# Patient Record
Sex: MALE | Race: BLACK OR AFRICAN AMERICAN | Employment: FULL TIME | URBAN - METROPOLITAN AREA
[De-identification: names, ages, dates, MRNs, and addresses within clinical notes are randomized per-mention and may not be internally consistent; named-entity substitution may affect disease eponyms.]

---

## 2017-01-04 RX ORDER — OLMESARTAN MEDOXOMIL AND HYDROCHLOROTHIAZIDE 20/12.5 20; 12.5 MG/1; MG/1
TABLET ORAL
Qty: 30 TAB | Refills: 12 | Status: SHIPPED | OUTPATIENT
Start: 2017-01-04 | End: 2017-10-24 | Stop reason: SDUPTHER

## 2017-01-18 ENCOUNTER — OFFICE VISIT (OUTPATIENT)
Dept: SLEEP MEDICINE | Age: 52
End: 2017-01-18

## 2017-01-18 VITALS
SYSTOLIC BLOOD PRESSURE: 123 MMHG | HEIGHT: 70 IN | WEIGHT: 263 LBS | BODY MASS INDEX: 37.65 KG/M2 | DIASTOLIC BLOOD PRESSURE: 87 MMHG | HEART RATE: 91 BPM | OXYGEN SATURATION: 96 %

## 2017-01-18 DIAGNOSIS — G47.33 OSA (OBSTRUCTIVE SLEEP APNEA): Primary | ICD-10-CM

## 2017-01-18 DIAGNOSIS — E66.9 OBESITY (BMI 30-39.9): ICD-10-CM

## 2017-01-18 NOTE — PROGRESS NOTES
217 [facility] [place]. [place], [address]  Tel.  [phone]  Fax. 100 [facility] 60  [place], [address] [facility]  Tel.  [phone]  Fax. [phone] [address]  Tel.  [phone]  Fax. [phone]       Subjective:      Leslie Meng is a 49 y.o. male who I am asked to see in consultation for evaluation and management of sleep apnea. He was diagnosed with sleep apnea 10 years ago. He is using his device regularly. He complains of awakening in the middle of the night because of SOB associated with CPAP device malfunctioning. Symptoms began a few weeks ago, unchanged since that time. He usually can fall asleep in 5 minutes. Family or house members note snoring. He denies completely or partially paralyzed while falling asleep or waking up. There are no  mask comfort problems. The following problems are noted with PAP:     Drowsiness no Problems exhaling no   Snoring no Forget to put on no   Mask Comfortable yes Can't fall asleep no   Dry Mouth no Mask falls off no   Air Leaking no Frequent awakenings no     Marleny Puckette Sr. does wake up frequently at night. He is not bothered by waking up too early and left unable to get back to sleep. He actually sleeps about 5 hours at night and wakes up about 3 times during the night. He does work shifts:  First Shift. Analisa Caal indicates he does get too little sleep at night. His bedtime is 2300. He awakens at 0500. He does not take naps. He takes   naps a week lasting  . He has the following observed behaviors: Loud snoring, Pauses in breathing;  . Other remarks: waking with gasp    [place] Sleepiness Score: 17   which reflect moderate daytime drowsiness.     No Known Allergies      Current Outpatient Prescriptions:     olmesartan-hydroCHLOROthiazide (BENICAR HCT) 20-12.5 mg per tablet, TAKE 1 TABLET BY MOUTH DAILY FOR BLOOD PRESSURE, Disp: 30 Tab, Rfl: 12    atomoxetine (STRATTERA) 40 mg capsule, Take 1 Cap by mouth daily. , Disp: 30 Cap, Rfl: 12    amphetamine-dextroamphetamine XR (ADDERALL XR) 20 mg XR capsule, Take 2 Caps by mouth every morning. Max Daily Amount: 40 mg., Disp: 60 Cap, Rfl: 0    testosterone cypionate (DEPOTESTOTERONE CYPIONATE) 200 mg/mL injection, , Disp: , Rfl:     tadalafil (CIALIS) 20 mg tablet, Take 1 Tab by mouth as needed. 1/2 - 1 tab one hour before sex on an empty stomach, Disp: 5 Tab, Rfl: 12    levothyroxine (SYNTHROID) 300 mcg tablet, Take  by mouth daily (before breakfast). , Disp: , Rfl:     promethazine-dextromethorphan (PROMETHAZINE-DM) 6.25-15 mg/5 mL syrup, Take 5 mL by mouth four (4) times daily as needed for Cough. , Disp: 240 mL, Rfl: 2    azithromycin (ZITHROMAX) 250 mg tablet, 2 tabs day 1 , then 1 tab daily next 4 days, Disp: 6 Tab, Rfl: 0    promethazine-codeine (PHENERGAN WITH CODEINE) 6.25-10 mg/5 mL syrup, Take 5 mL by mouth every six (6) hours as needed for Cough. Max Daily Amount: 20 mL., Disp: 240 mL, Rfl: o    naproxen (NAPROSYN) 500 mg tablet, Take 1 Tab by mouth two (2) times daily (with meals). For back pain, Disp: 60 Tab, Rfl: 5     He  has a past medical history of Hypertension; Sleep apnea; and Thyroid disease. He  has a past surgical history that includes orthopaedic; mohs procedure; other surgical (age 7); and cyst removal (2012). He family history includes Diabetes in his brother and father; Heart Disease in his mother. He  reports that he has never smoked. He has never used smokeless tobacco. He reports that he does not drink alcohol or use illicit drugs. Review of Systems:  Constitutional:  No significant weight loss or weight gain. Eyes:  No blurred vision. CVS:  No significant chest pain  Pulm:  No significant shortness of breath  GI:  No significant nausea or vomiting  :  No significant nocturia  Musculoskeletal:  No significant joint pain at night  Skin:  No significant rashes  Neuro:   No significant dizziness   Psych:  No active mood issues    Sleep Review of Systems: notable for no difficulty falling asleep; infrequent awakenings at night;  regular dreaming noted; no nightmares ; no early morning headaches ; no memory problems; no concentration issues; no history of any automobile or occupational accidents due to daytime drowsiness. Objective:     Visit Vitals    /87    Pulse 91    Ht 5' 10\" (1.778 m)    Wt 263 lb (119.3 kg)    SpO2 96%    BMI 37.74 kg/m2         General:   Not in acute distress   Eyes:  Anicteric sclerae, no obvious strabismus   Nose:  No obvious nasal septum deviation    Oropharynx:   Class 3 oropharyngeal outlet, thick tongue base,low-lying soft palate, narrow tonsilo-pharyngeal pilars   Tonsils:   tonsils are absent   Neck:   Neck circ. in \"inches\": 17.5; midline trachea   Chest/Lungs:  Equal lung expansion, clear on auscultation    CVS:  Normal rate, regular rhythm; no JVD   Skin:  Warm to touch; no obvious rashes   Neuro:  No focal deficits ; no obvious tremor    Psych:  Normal affect,  normal countenance;          Assessment:       ICD-10-CM ICD-9-CM    1. ASHLY (obstructive sleep apnea) G47.33 327.23 AMB SUPPLY ORDER   2. Obesity (BMI 30-39. 9) E66.9 278.00      AHI = ?. On CPAP :  13 cmH2O. Compliant:      yes    Therapeutic Response:  Positive  Plan:     * He was provided information on sleep apnea including coresponding risk factors and the importance of proper treatment. * He was likewise counseled on weight management and lateral sleeping posture (with the use of bed pillows or wedge) which may reduce sleep apnea events. Caution with hypnotics, alcohol, and sedating pain medications as these can worsen sleep apnea. * We've requested previous sleep records and studies. Orders Placed This Encounter    AMB SUPPLY ORDER     * Loaner repair/replace Positive Airway Pressure device.   Current device dysfunctional.    Positive Airway Pressure CPAP 5-15 cmH2O  Airsense 10 Autoset with climate line as needed  PAP Mask  patient preference,heated humidifer, tubing, filters (all sleep supplies) as needed    Wireless modem enrollment with privileges to change therapy remotely - indefinite. Length of Need = 99 months    Rigo Francisco M.D.  (electronically signed)  Diplomate in Sleep Medicine, [facility]   * PAP card download in 4 weeks. PAP clinic if adherence remains poor  Follow-up Disposition:  Return if symptoms worsen or fail to improve. Counseling was provided regarding the importance of regular PAP use and on proper sleep hygiene and safe driving. I have reviewed/discussed the above normal BMI with the patient. I have recommended the following interventions: dietary management education, guidance, and counseling . The plan is as follows: I have counseled this patient on diet and exercise regimens. .    Thank you for allowing us to participate in your patient's medical care.     Rigo Francisco M.D.  (electronically signed)  Diplomate in Sleep Medicine, [facility]

## 2017-01-18 NOTE — PATIENT INSTRUCTIONS
217 [facility] [place]. [place], [address]  Tel.  [phone]  Fax. 100 [facility] 60  [place], [address] [facility]  Tel.  [phone]  Fax. [phone] [facility] [place] [address] Marija Damico  Tel.  [phone]  Fax. [phone]     Learning About CPAP for Sleep Apnea  What is CPAP? CPAP is a small machine that you use at home every night while you sleep. It increases air pressure in your throat to keep your airway open. When you have sleep apnea, this can help you sleep better so you feel much better. CPAP stands for \"continuous positive airway pressure. \"  The CPAP machine will have one of the following:  · A mask that covers your nose and mouth  · Prongs that fit into your nose  · A mask that covers your nose only, the most common type. This type is called NCPAP. The N stands for \"nasal.\"  Why is it done? CPAP is usually the best treatment for obstructive sleep apnea. It is the first treatment choice and the most widely used. Your doctor may suggest CPAP if you have:  · Moderate to severe sleep apnea. · Sleep apnea and coronary artery disease (CAD) or heart failure. How does it help? · CPAP can help you have more normal sleep, so you feel less sleepy and more alert during the daytime. · CPAP may help keep heart failure or other heart problems from getting worse. · NCPAP may help lower your blood pressure. · If you use CPAP, your bed partner may also sleep better because you are not snoring or restless. What are the side effects? Some people who use CPAP have:  · A dry or stuffy nose and a sore throat. · Irritated skin on the face. · Sore eyes. · Bloating. If you have any of these problems, work with your doctor to fix them. Here are some things you can try:  · Be sure the mask or nasal prongs fit well. · See if your doctor can adjust the pressure of your CPAP. · If your nose is dry, try a humidifier.   · If your nose is runny or stuffy, try decongestant medicine or a steroid nasal spray. If these things do not help, you might try a different type of machine. Some machines have air pressure that adjusts on its own. Others have air pressures that are different when you breathe in than when you breathe out. This may reduce discomfort caused by too much pressure in your nose. Where can you learn more? Go to GSOUND.be  Enter Latoya Toscano in the search box to learn more about \"Learning About CPAP for Sleep Apnea. \"   © 1055-3416 Healthwise, Incorporated. Care instructions adapted under license by [facility] [employer] (which disclaims liability or warranty for this information). This care instruction is for use with your licensed healthcare professional. If you have questions about a medical condition or this instruction, always ask your healthcare professional. [address] any warranty or liability for your use of this information. Content Version: 5.9.30732; Last Revised: January 11, 2010  PROPER SLEEP HYGIENE    What to avoid  · Do not have drinks with caffeine, such as coffee or black tea, for 8 hours before bed. · Do not smoke or use other types of tobacco near bedtime. Nicotine is a stimulant and can keep you awake. · Avoid drinking alcohol late in the evening, because it can cause you to wake in the middle of the night. · Do not eat a big meal close to bedtime. If you are hungry, eat a light snack. · Do not drink a lot of water close to bedtime, because the need to urinate may wake you up during the night. · Do not read or watch TV in bed. Use the bed only for sleeping and sexual activity. What to try  · Go to bed at the same time every night, and wake up at the same time every morning. Do not take naps during the day. · Keep your bedroom quiet, dark, and cool. · Get regular exercise, but not within 3 to 4 hours of your bedtime. .  · Sleep on a comfortable pillow and mattress.   · If watching the clock makes you anxious, turn it facing away from you so you cannot see the time. · If you worry when you lie down, start a worry book. Well before bedtime, write down your worries, and then set the book and your concerns aside. · Try meditation or other relaxation techniques before you go to bed. · If you cannot fall asleep, get up and go to another room until you feel sleepy. Do something relaxing. Repeat your bedtime routine before you go to bed again. · Make your house quiet and calm about an hour before bedtime. Turn down the lights, turn off the TV, log off the computer, and turn down the volume on music. This can help you relax after a busy day. Drowsy Driving: The [facility] 54 cites drowsiness as a causing factor in more than 638,006 police reported crashes annually, resulting in 76,000 injuries and 1,500 deaths. Other surveys suggest 55% of people polled have driven while drowsy in the past year, 23% had fallen asleep but not crashed, 3% crashed, and 2% had and accident due to drowsy driving. Who is at risk? Young Drivers: One study of drowsy driving accidents states that 55% of the drivers were under 25 years. Of those, 75% were male. Shift Workers and Travelers: People who work overnight or travel across time zones frequently are at higher risk of experiencing Circadian Rhythm Disorders. They are trying to work and function when their body is programed to sleep. Sleep Deprived: Lack of sleep has a serious impact on your ability to pay attention or focus on a task. Consistently getting less than the average of 8 hours your body needs creates partial or cumulative sleep deprivation. Untreated Sleep Disorders: Sleep Apnea, Narcolepsy, R.L.S., and other sleep disorders (untreated) prevent a person from getting enough restful sleep. This leads to excessive daytime sleepiness and increases the risk for drowsy driving accidents by up to 7 times.   Medications / Alcohol: Even over the counter medications can cause drowsiness. Medications that impair a drivers attention should have a warning label. Alcohol naturally makes you sleepy and on its own can cause accidents. Combined with excessive drowsiness its effects are amplified. Signs of Drowsy Driving:   * You don't remember driving the last few miles   * You may drift out of your nagi   * You are unable to focus and your thoughts wander   * You may yawn more often than normal   * You have difficulty keeping your eyes open / nodding off   * Missing traffic signs, speeding, or tailgating  Prevention-   Good sleep hygiene, lifestyle and behavioral choices have the most impact on drowsy driving. There is no substitute for sleep and the average person requires 8 hours nightly. If you find yourself driving drowsy, stop and sleep. Consider the sleep hygiene tips provided during your visit as well. Medication Refill Policy: Refills for all medications require 1 week advance notice. Please have your pharmacy fax a refill request. We are unable to fax, or call in \"controled substance\" medications and you will need to pick these prescriptions up from our office. FiscalNote Activation    Thank you for requesting access to [employer]. Please follow the instructions below to securely access and download your online medical record. [employer]Note allows you to send messages to your doctor, view your test results, renew your prescriptions, schedule appointments, and more. How Do I Sign Up? 1. In your internet browser, go to [email or web address]. 2. Click on the First Time User? Click Here link in the Sign In box. You will see the New Member Sign Up page. 3. Enter your FiscalNote Access Code exactly as it appears below. You will not need to use this code after youve completed the sign-up process. If you do not sign up before the expiration date, you must request a new code.     FiscalNote Access Code: [email or web address]  Expires: 2017  3:41 PM (This is the date your [employer] access code will )    4. Enter the last four digits of your Social Security Number (xxxx) and Date of Birth (mm/dd/yyyy) as indicated and click Submit. You will be taken to the next sign-up page. 5. Create a [employer] ID. This will be your [employer] login ID and cannot be changed, so think of one that is secure and easy to remember. 6. Create a [employer] password. You can change your password at any time. 7. Enter your Password Reset Question and Answer. This can be used at a later time if you forget your password. 8. Enter your e-mail address. You will receive e-mail notification when new information is available in [address] 9. Click Sign Up. You can now view and download portions of your medical record. 10. Click the Download Summary menu link to download a portable copy of your medical information. Additional Information    If you have questions, please call [phone]. Remember, [employer] is NOT to be used for urgent needs. For medical emergencies, dial 911. Starting a Weight Loss Plan: After Your Visit  Your Care Instructions  If you are thinking about losing weight, it can be hard to know where to start. Your doctor can help you set up a weight loss plan that best meets your needs. You may want to take a class on nutrition or exercise, or join a weight loss support group. If you have questions about how to make changes to your eating or exercise habits, ask your doctor about seeing a registered dietitian or an exercise specialist.  It can be a big challenge to lose weight. But you do not have to make huge changes at once. Make small changes, and stick with them. When those changes become habit, add a few more changes. If you do not think you are ready to make changes right now, try to pick a date in the future.  Make an appointment to see your doctor to discuss whether the time is right for you to start a plan.  Follow-up care is a key part of your treatment and safety. Be sure to make and go to all appointments, and call your doctor if you are having problems. It's also a good idea to know your test results and keep a list of the medicines you take. How can you care for yourself at home? · Set realistic goals. Many people expect to lose much more weight than is likely. A weight loss of 5% to 10% of your body weight may be enough to improve your health. · Get family and friends involved to provide support. Talk to them about why you are trying to lose weight, and ask them to help. They can help by participating in exercise and having meals with you, even if they may be eating something different. · Find what works best for you. If you do not have time or do not like to cook, a program that offers meal replacement bars or shakes may be better for you. Or if you like to prepare meals, finding a plan that includes daily menus and recipes may be best.  · Ask your doctor about other health professionals who can help you achieve your weight loss goals. ¨ A dietitian can help you make healthy changes in your diet. ¨ An exercise specialist or  can help you develop a safe and effective exercise program.  ¨ A counselor or psychiatrist can help you cope with issues such as depression, anxiety, or family problems that can make it hard to focus on weight loss. · Consider joining a support group for people who are trying to lose weight. Your doctor can suggest groups in your area. Where can you learn more? Go to inVentiv Health.be  Enter U357 in the search box to learn more about \"Starting a Weight Loss Plan: After Your Visit. \"   © 1403-7163 Healthwise, Incorporated. Care instructions adapted under license by Rosa Chou (which disclaims liability or warranty for this information).  This care instruction is for use with your licensed healthcare professional. If you have questions about a medical condition or this instruction, always ask your healthcare professional. [place] [address] any warranty or liability for your use of this information.   Content Version: 2.1.87526; Last Revised: September 1, 2009

## 2017-01-18 NOTE — MR AVS SNAPSHOT
Visit Information Date & Time Provider Department Dept. Phone Encounter #  
 1/18/2017  3:20 PM Shante Lyle[facility] [id number] Follow-up Instructions Return if symptoms worsen or fail to improve. Routing History Follow-up and Disposition History Your Appointments 1/31/2017  4:15 PM  
Any with Padma Clifford MD  
[facility] [address] [facility]) Appt Note: med refill for ADD  
 [address] [place] [id number]  
[phone] [address] [facility] [address] Upcoming Health Maintenance Date Due Pneumococcal 19-64 Highest Risk (1 of 3 - PCV13) 9/19/1985 FOBT Q 1 YEAR AGE 50-75 9/19/2016 DTaP/Tdap/Td series (2 - Td) 9/26/2022 Allergies as of 1/18/2017  Review Complete On: 1/18/2017 By: Shante Lyle MD  
 No Known Allergies Current Immunizations  Reviewed on 11/24/2015 Name Date Influenza Vaccine 10/17/2013 TB Skin Test (PPD) Intradermal 11/24/2015 TDAP Vaccine 9/26/2012 Not reviewed this visit You Were Diagnosed With   
  
 Codes Comments ASHLY (obstructive sleep apnea)    -  Primary ICD-10-CM: G47.33 
ICD-9-CM: 327.23 Obesity (BMI 30-39. 9)     ICD-10-CM: E66.9 ICD-9-CM: 278.00 Vitals BP Pulse Height(growth percentile) Weight(growth percentile) SpO2 BMI  
 123/87 91 5' 10\" (1.778 m) 263 lb (119.3 kg) 96% 37.74 kg/m2 Smoking Status Never Smoker Vitals History BMI and BSA Data Body Mass Index Body Surface Area  
 37.74 kg/m 2 2.43 m 2 Preferred Pharmacy Pharmacy Name Phone [id number] [phone] [phone] Your Updated Medication List  
  
   
This list is accurate as of: 1/18/17  3:46 PM.  Always use your most recent med list.  
  
  
  
  
 amphetamine-dextroamphetamine XR 20 mg XR capsule Commonly known as:  ADDERALL XR Take 2 Caps by mouth every morning. Max Daily Amount: 40 mg.  
  
 atomoxetine 40 mg capsule Commonly known as:  STRATTERA Take 1 Cap by mouth daily. azithromycin 250 mg tablet Commonly known as:  Sherren Mohair 2 tabs day 1 , then 1 tab daily next 4 days  
  
 naproxen 500 mg tablet Commonly known as:  NAPROSYN Take 1 Tab by mouth two (2) times daily (with meals). For back pain  
  
 olmesartan-hydroCHLOROthiazide 20-12.5 mg per tablet Commonly known as:  BENICAR HCT  
TAKE 1 TABLET BY MOUTH DAILY FOR BLOOD PRESSURE  
  
 promethazine-codeine 6.25-10 mg/5 mL syrup Commonly known as:  PHENERGAN with CODEINE Take 5 mL by mouth every six (6) hours as needed for Cough. Max Daily Amount: 20 mL. promethazine-dextromethorphan 6.25-15 mg/5 mL syrup Commonly known as:  PROMETHAZINE-DM Take 5 mL by mouth four (4) times daily as needed for Cough. SYNTHROID 300 mcg tablet Generic drug:  levothyroxine Take  by mouth daily (before breakfast). tadalafil 20 mg tablet Commonly known as:  CIALIS Take 1 Tab by mouth as needed. 1/2 - 1 tab one hour before sex on an empty stomach  
  
 testosterone cypionate 200 mg/mL injection Commonly known as:  DEPOTESTOTERONE CYPIONATE We Performed the Following AMB SUPPLY ORDER [[id number] Custom] Comments:  
 * Loaner repair/replace Positive Airway Pressure device. Current device dysfunctional. 
 
Positive Airway Pressure CPAP 5-15 cmH2O Airsense 10 Autoset with climate line as needed PAP Mask  patient preference,heated humidifer, tubing, filters (all sleep supplies) as needed Wireless modem enrollment with privileges to change therapy remotely - indefinite. Length of Need = 99 months La Ramey M.D.  (electronically signed) Diplomate in Sleep Medicine, ABIM Follow-up Instructions Return if symptoms worsen or fail to improve. Patient Instructions [address] [facility]., [place]. [address] [facility], [address] Tel.  [phone] Fax. 100 [facility] [address] [place], [address] Tel.  [phone] Fax. [phone] [address] [place] [address] Marija Damico  Tel.  [phone] Fax. [phone] Learning About CPAP for Sleep Apnea What is CPAP? CPAP is a small machine that you use at home every night while you sleep. It increases air pressure in your throat to keep your airway open. When you have sleep apnea, this can help you sleep better so you feel much better. CPAP stands for \"continuous positive airway pressure. \" The CPAP machine will have one of the following: · A mask that covers your nose and mouth · Prongs that fit into your nose · A mask that covers your nose only, the most common type. This type is called NCPAP. The N stands for \"nasal.\" Why is it done? CPAP is usually the best treatment for obstructive sleep apnea. It is the first treatment choice and the most widely used. Your doctor may suggest CPAP if you have: · Moderate to severe sleep apnea. · Sleep apnea and coronary artery disease (CAD) or heart failure. How does it help? · CPAP can help you have more normal sleep, so you feel less sleepy and more alert during the daytime. · CPAP may help keep heart failure or other heart problems from getting worse. · NCPAP may help lower your blood pressure. · If you use CPAP, your bed partner may also sleep better because you are not snoring or restless. What are the side effects? Some people who use CPAP have: · A dry or stuffy nose and a sore throat. · Irritated skin on the face. · Sore eyes. · Bloating. If you have any of these problems, work with your doctor to fix them. Here are some things you can try: · Be sure the mask or nasal prongs fit well. · See if your doctor can adjust the pressure of your CPAP. · If your nose is dry, try a humidifier. · If your nose is runny or stuffy, try decongestant medicine or a steroid nasal spray. If these things do not help, you might try a different type of machine. Some machines have air pressure that adjusts on its own. Others have air pressures that are different when you breathe in than when you breathe out. This may reduce discomfort caused by too much pressure in your nose. Where can you learn more? Go to [employer].be Enter H987 in the search box to learn more about \"Learning About CPAP for Sleep Apnea. \"  
© 8190-4395 Healthwise, [employer]. Care instructions adapted under license by Mary Cunha (which disclaims liability or warranty for this information). This care instruction is for use with your licensed healthcare professional. If you have questions about a medical condition or this instruction, always ask your healthcare professional. [address] any warranty or liability for your use of this information. Content Version: 1.7.52950; Last Revised: January 11, 2010 PROPER SLEEP HYGIENE What to avoid · Do not have drinks with caffeine, such as coffee or black tea, for 8 hours before bed. · Do not smoke or use other types of tobacco near bedtime. Nicotine is a stimulant and can keep you awake. · Avoid drinking alcohol late in the evening, because it can cause you to wake in the middle of the night. · Do not eat a big meal close to bedtime. If you are hungry, eat a light snack. · Do not drink a lot of water close to bedtime, because the need to urinate may wake you up during the night. · Do not read or watch TV in bed. Use the bed only for sleeping and sexual activity. What to try · Go to bed at the same time every night, and wake up at the same time every morning. Do not take naps during the day. · Keep your bedroom quiet, dark, and cool. · Get regular exercise, but not within 3 to 4 hours of your bedtime. [place] Karlaclaudia · Sleep on a comfortable pillow and mattress. · If watching the clock makes you anxious, turn it facing away from you so you cannot see the time. · If you worry when you lie down, start a worry book. Well before bedtime, write down your worries, and then set the book and your concerns aside. · Try meditation or other relaxation techniques before you go to bed. · If you cannot fall asleep, get up and go to another room until you feel sleepy. Do something relaxing. Repeat your bedtime routine before you go to bed again. · Make your house quiet and calm about an hour before bedtime. Turn down the lights, turn off the TV, log off the computer, and turn down the volume on music. This can help you relax after a busy day. Drowsy Driving: The [employer] cites drowsiness as a causing factor in more than 100,814 police reported crashes annually, resulting in 76,000 injuries and 1,500 deaths. Other surveys suggest 55% of people polled have driven while drowsy in the past year, 23% had fallen asleep but not crashed, 3% crashed, and 2% had and accident due to drowsy driving. Who is at risk? Young Drivers: One study of drowsy driving accidents states that 55% of the drivers were under 25 years. Of those, 75% were male. Shift Workers and Travelers: People who work overnight or travel across time zones frequently are at higher risk of experiencing Circadian Rhythm Disorders. They are trying to work and function when their body is programed to sleep. Sleep Deprived: Lack of sleep has a serious impact on your ability to pay attention or focus on a task. Consistently getting less than the average of 8 hours your body needs creates partial or cumulative sleep deprivation.   
Untreated Sleep Disorders: Sleep Apnea, Narcolepsy, R.L.S., and other sleep disorders (untreated) prevent a person from getting enough restful sleep. This leads to excessive daytime sleepiness and increases the risk for drowsy driving accidents by up to 7 times. Medications / Alcohol: Even over the counter medications can cause drowsiness. Medications that impair a drivers attention should have a warning label. Alcohol naturally makes you sleepy and on its own can cause accidents. Combined with excessive drowsiness its effects are amplified. Signs of Drowsy Driving: * You don't remember driving the last few miles * You may drift out of your nagi * You are unable to focus and your thoughts wander * You may yawn more often than normal 
 * You have difficulty keeping your eyes open / nodding off * Missing traffic signs, speeding, or tailgating Prevention-  
Good sleep hygiene, lifestyle and behavioral choices have the most impact on drowsy driving. There is no substitute for sleep and the average person requires 8 hours nightly. If you find yourself driving drowsy, stop and sleep. Consider the sleep hygiene tips provided during your visit as well. Medication Refill Policy: Refills for all medications require 1 week advance notice. Please have your pharmacy fax a refill request. We are unable to fax, or call in \"controled substance\" medications and you will need to pick these prescriptions up from our office. [employer] Activation Thank you for requesting access to [employer]. Please follow the instructions below to securely access and download your online medical record. [employer] allows you to send messages to your doctor, view your test results, renew your prescriptions, schedule appointments, and more. How Do I Sign Up? 1. In your internet browser, go to [email or web address]. 2. Click on the First Time User? Click Here link in the Sign In box. You will see the New Member Sign Up page. 3. Enter your Dyyno Access Code exactly as it appears below. You will not need to use this code after youve completed the sign-up process. If you do not sign up before the expiration date, you must request a new code. Dyyno Access Code: [email or web address] Expires: 2017  3:41 PM (This is the date your WirelessGatet access code will ) 4. Enter the last four digits of your Social Security Number (xxxx) and Date of Birth (mm/dd/yyyy) as indicated and click Submit. You will be taken to the next sign-up page. 5. Create a Dyyno ID. This will be your Dyyno login ID and cannot be changed, so think of one that is secure and easy to remember. 6. Create a Dyyno password. You can change your password at any time. 7. Enter your Password Reset Question and Answer. This can be used at a later time if you forget your password. 8. Enter your e-mail address. You will receive e-mail notification when new information is available in [address] 9. Click Sign Up. You can now view and download portions of your medical record. 10. Click the Download Summary menu link to download a portable copy of your medical information. Additional Information If you have questions, please call [phone]. Remember, Dy[employer]o is NOT to be used for urgent needs. For medical emergencies, dial 911. Starting a Weight Loss Plan: After Your Visit Your Care Instructions If you are thinking about losing weight, it can be hard to know where to start. Your doctor can help you set up a weight loss plan that best meets your needs. You may want to take a class on nutrition or exercise, or join a weight loss support group. If you have questions about how to make changes to your eating or exercise habits, ask your doctor about seeing a registered dietitian or an exercise specialist. 
It can be a big challenge to lose weight. But you do not have to make huge changes at once. Make small changes, and stick with them.  When those changes become habit, add a few more changes. If you do not think you are ready to make changes right now, try to pick a date in the future. Make an appointment to see your doctor to discuss whether the time is right for you to start a plan. Follow-up care is a key part of your treatment and safety. Be sure to make and go to all appointments, and call your doctor if you are having problems. It's also a good idea to know your test results and keep a list of the medicines you take. How can you care for yourself at home? · Set realistic goals. Many people expect to lose much more weight than is likely. A weight loss of 5% to 10% of your body weight may be enough to improve your health. · Get family and friends involved to provide support. Talk to them about why you are trying to lose weight, and ask them to help. They can help by participating in exercise and having meals with you, even if they may be eating something different. · Find what works best for you. If you do not have time or do not like to cook, a program that offers meal replacement bars or shakes may be better for you. Or if you like to prepare meals, finding a plan that includes daily menus and recipes may be best. 
· Ask your doctor about other health professionals who can help you achieve your weight loss goals. ¨ A dietitian can help you make healthy changes in your diet. ¨ An exercise specialist or  can help you develop a safe and effective exercise program. 
¨ A counselor or psychiatrist can help you cope with issues such as depression, anxiety, or family problems that can make it hard to focus on weight loss. · Consider joining a support group for people who are trying to lose weight. Your doctor can suggest groups in your area. Where can you learn more? Go to Telller.be Enter G120 in the search box to learn more about \"Starting a Weight Loss Plan: After Your Visit. \"  
 © 5540-9375 Healthwise, Inc[employer]. Care instructions adapted under license by [employer] (which disclaims liability or warranty for this information). This care instruction is for use with your licensed healthcare professional. If you have questions about a medical condition or this instruction, always ask your healthcare professional. [address] any warranty or liability for your use of this information. Content Version: 8.5.48673; Last Revised: September 1, 2009 Introducing [facility]! [facility] [employer] introduces Gridstore patient portal. Now you can access parts of your medical record, email your doctor's office, and request medication refills online. 1. In your internet browser, go to [email or web address] 2. Click on the First Time User? Click Here link in the Sign In box. You will see the New Member Sign Up page. 3. Enter your Gridstore Access Code exactly as it appears below. You will not need to use this code after youve completed the sign-up process. If you do not sign up before the expiration date, you must request a new code. · Gridstore Access Code: [email or web address] Expires: 4/18/2017  3:41 PM 
 
4. Enter the last four digits of your Social Security Number (xxxx) and Date of Birth (mm/dd/yyyy) as indicated and click Submit. You will be taken to the next sign-up page. 5. Create a Gridstore ID. This will be your Gridstore login ID and cannot be changed, so think of one that is secure and easy to remember. 6. Create a Gridstore password. You can change your password at any time. 7. Enter your Password Reset Question and Answer. This can be used at a later time if you forget your password. 8. Enter your e-mail address. You will receive e-mail notification when new information is available in [address] 9. Click Sign Up. You can now view and download portions of your medical record. 10. Click the Download Summary menu link to download a portable copy of your medical information. If you have questions, please visit the Frequently Asked Questions section of the [employer] website. Remember, [employer] is NOT to be used for urgent needs. For medical emergencies, dial 911. Now available from your iPhone and Android! Please provide this summary of care documentation to your next provider. Your primary care clinician is listed as Elena Cantu. If you have any questions after today's visit, please call [phone].

## 2017-01-20 ENCOUNTER — DOCUMENTATION ONLY (OUTPATIENT)
Dept: SLEEP MEDICINE | Age: 52
End: 2017-01-20

## 2017-01-31 ENCOUNTER — OFFICE VISIT (OUTPATIENT)
Dept: FAMILY MEDICINE CLINIC | Age: 52
End: 2017-01-31

## 2017-01-31 VITALS
RESPIRATION RATE: 14 BRPM | TEMPERATURE: 98.6 F | DIASTOLIC BLOOD PRESSURE: 66 MMHG | HEART RATE: 99 BPM | SYSTOLIC BLOOD PRESSURE: 116 MMHG | BODY MASS INDEX: 36.45 KG/M2 | OXYGEN SATURATION: 98 % | WEIGHT: 254.6 LBS | HEIGHT: 70 IN

## 2017-01-31 DIAGNOSIS — F98.8 ADD (ATTENTION DEFICIT DISORDER): ICD-10-CM

## 2017-01-31 DIAGNOSIS — Z13.9 SCREENING: Primary | ICD-10-CM

## 2017-01-31 DIAGNOSIS — M65.341 TRIGGER RING FINGER OF RIGHT HAND: ICD-10-CM

## 2017-01-31 RX ORDER — DEXTROAMPHETAMINE SACCHARATE, AMPHETAMINE ASPARTATE MONOHYDRATE, DEXTROAMPHETAMINE SULFATE AND AMPHETAMINE SULFATE 5; 5; 5; 5 MG/1; MG/1; MG/1; MG/1
40 CAPSULE, EXTENDED RELEASE ORAL
Qty: 60 CAP | Refills: 0 | Status: SHIPPED | OUTPATIENT
Start: 2017-01-31 | End: 2017-06-06 | Stop reason: SDUPTHER

## 2017-01-31 RX ORDER — METHYLPREDNISOLONE ACETATE 40 MG/ML
40 INJECTION, SUSPENSION INTRA-ARTICULAR; INTRALESIONAL; INTRAMUSCULAR; SOFT TISSUE ONCE
Qty: 1 ML | Refills: 0
Start: 2017-01-31 | End: 2017-01-31

## 2017-01-31 NOTE — PROGRESS NOTES
HISTORY OF PRESENT ILLNESS  Stacie Mahan is a 49 y.o. male. HPI In for recheck. Has been doing fairly well. WAiting for his new CPAP machine. Having trouble with current machine. Work is going along ok. Still taking adderall, thinks it is helping him focus. 23year old son is an [employer] in [place]. Also co trigger finger R 4th finger. Needs several skin tags frozen    ROS    Physical Exam   Constitutional: He appears well-developed and well-nourished. HENT:   Right Ear: External ear normal.   Left Ear: External ear normal.   Mouth/Throat: Oropharynx is clear and moist.   Neck: No thyromegaly present. Cardiovascular: Normal rate, regular rhythm, normal heart sounds and intact distal pulses. Pulmonary/Chest: Effort normal and breath sounds normal. No respiratory distress. He has no wheezes. Abdominal: Soft. Bowel sounds are normal. He exhibits no distension and no mass. There is no tenderness. There is no guarding. Musculoskeletal: Normal range of motion. He exhibits no edema. R 4th finger- tenderness, nodule distal 4th metacarpal   Lymphadenopathy:     He has no cervical adenopathy. Skin:   Several skin tags in axilla bilaterally   Nursing note and vitals reviewed. ASSESSMENT and PLAN  Orders Placed This Encounter    (DEPO-MEDROL 40 mg  -   quantity 1 for Reimbursement) METHYLPREDNISOLONE ACETATE 40 mg injection    20600 - DRAIN/INJECT SMALL JOINT/BURSA    methylPREDNISolone acetate (DEPO-MEDROL) 40 mg/mL injection    amphetamine-dextroamphetamine XR (ADDERALL XR) 20 mg XR capsule     Ignacia Malcolm was seen today for medication refill. Diagnoses and all orders for this visit:    Screening    Trigger ring finger of right hand  -     methylPREDNISolone acetate (DEPO-MEDROL) 40 mg/mL injection; 1 mL by IntraMUSCular route once for 1 dose.   -     (DEPO-MEDROL 40 mg  -   quantity 1 for Reimbursement) METHYLPREDNISOLONE ACETATE 40 mg injection  -     20600 - DRAIN/INJECT SMALL JOINT/BURSA    ADD (attention deficit disorder)    Other orders  -     Cancel: AMB POC FECAL BLOOD, OCCULT, QL 1 CARD  -     amphetamine-dextroamphetamine XR (ADDERALL XR) 20 mg XR capsule; Take 2 Caps (40 mg total) by mouth every morning.   Max Daily Amount: 40 mg      Follow-up Disposition: Not on File  Several skin tags frozen also

## 2017-01-31 NOTE — MR AVS SNAPSHOT
Visit Information Date & Time Provider Department Dept. Phone Encounter #  
 1/31/2017  4:15 PM Caridad Comer MD [facility] [phone] Upcoming Health Maintenance Date Due Pneumococcal 19-64 Highest Risk (1 of 3 - PCV13) 9/19/1985 FOBT Q 1 YEAR AGE 50-75 9/19/2016 DTaP/Tdap/Td series (2 - Td) 9/26/2022 Allergies as of 1/31/2017  Review Complete On: 1/31/2017 By: Caridad Comer MD  
 No Known Allergies Current Immunizations  Reviewed on 11/24/2015 Name Date Influenza Vaccine 10/17/2013 TB Skin Test (PPD) Intradermal 11/24/2015 TDAP Vaccine 9/26/2012 Not reviewed this visit You Were Diagnosed With   
  
 Codes Comments Screening    -  Primary ICD-10-CM: Z13.9 ICD-9-CM: V82.9 Trigger ring finger of right hand     ICD-10-CM: M65.341 ICD-9-CM: 727.03 Vitals BP Pulse Temp Resp Height(growth percentile) Weight(growth percentile) 116/66 99 98.6 °F (37 °C) (Oral) 14 5' 10\" (1.778 m) 254 lb 9.6 oz (115.5 kg) SpO2 BMI Smoking Status 98% 36.53 kg/m2 Never Smoker Vitals History BMI and BSA Data Body Mass Index Body Surface Area  
 36.53 kg/m 2 2.39 m 2 Preferred Pharmacy Pharmacy Name Phone [phone] [phone] Your Updated Medication List  
  
   
This list is accurate as of: 1/31/17  5:31 PM.  Always use your most recent med list.  
  
  
  
  
 amphetamine-dextroamphetamine XR 20 mg XR capsule Commonly known as:  ADDERALL XR Take 2 Caps (40 mg total) by mouth every morning. Max Daily Amount: 40 mg  
  
 atomoxetine 40 mg capsule Commonly known as:  STRATTERA Take 1 Cap by mouth daily. methylPREDNISolone acetate 40 mg/mL injection Commonly known as:  DEPO-Medrol 1 mL by IntraMUSCular route once for 1 dose.  
  
 naproxen 500 mg tablet Commonly known as:  NAPROSYN Take 1 Tab by mouth two (2) times daily (with meals). For back pain  
  
 olmesartan-hydroCHLOROthiazide 20-12.5 mg per tablet Commonly known as:  BENICAR HCT  
TAKE 1 TABLET BY MOUTH DAILY FOR BLOOD PRESSURE  
  
 promethazine-codeine 6.25-10 mg/5 mL syrup Commonly known as:  PHENERGAN with CODEINE Take 5 mL by mouth every six (6) hours as needed for Cough. Max Daily Amount: 20 mL. promethazine-dextromethorphan 6.25-15 mg/5 mL syrup Commonly known as:  PROMETHAZINE-DM Take 5 mL by mouth four (4) times daily as needed for Cough. SYNTHROID 300 mcg tablet Generic drug:  levothyroxine Take  by mouth daily (before breakfast). tadalafil 20 mg tablet Commonly known as:  CIALIS Take 1 Tab by mouth as needed. 1/2 - 1 tab one hour before sex on an empty stomach  
  
 testosterone cypionate 200 mg/mL injection Commonly known as:  DEPOTESTOTERONE CYPIONATE Prescriptions Printed Refills  
 amphetamine-dextroamphetamine XR (ADDERALL XR) 20 mg XR capsule 0 Sig: Take 2 Caps (40 mg total) by mouth every morning. Max Daily Amount: 40 mg  
 Class: Print Route: Oral  
  
We Performed the Following METHYLPREDNISOLONE ACETATE INJECTION 40 MG [ [facility]] Comments:  
 Submit quantity per 40 mg injection WA DRAIN/INJECT SMALL JOINT/BURSA [id number] CPT(R)] Introducing [facility]! [employer] introduces [employer] patient portal. Now you can access parts of your medical record, email your doctor's office, and request medication refills online. 1. In your internet browser, go to [email or web address] 2. Click on the First Time User? Click Here link in the Sign In box. You will see the New Member Sign Up page. 3. Enter your [employer] Access Code exactly as it appears below. You will not need to use this code after youve completed the sign-up process.  If you do not sign up before the expiration date, you must request a new code. · [employer] Access Code: [email or web address] Expires: 4/18/2017  3:41 PM 
 
4. Enter the last four digits of your Social Security Number (xxxx) and Date of Birth (mm/dd/yyyy) as indicated and click Submit. You will be taken to the next sign-up page. 5. Create a [employer] ID. This will be your [employer] login ID and cannot be changed, so think of one that is secure and easy to remember. 6. Create a [employer] password. You can change your password at any time. 7. Enter your Password Reset Question and Answer. This can be used at a later time if you forget your password. 8. Enter your e-mail address. You will receive e-mail notification when new information is available in [address] 9. Click Sign Up. You can now view and download portions of your medical record. 10. Click the Download Summary menu link to download a portable copy of your medical information. If you have questions, please visit the Frequently Asked Questions section of the [employer] website. Remember, [employer] is NOT to be used for urgent needs. For medical emergencies, dial 911. Now available from your iPhone and Android! Please provide this summary of care documentation to your next provider. Your primary care clinician is listed as Doug Bar. If you have any questions after today's visit, please call [phone].

## 2017-01-31 NOTE — PROGRESS NOTES
Chief Complaint   Patient presents with    Medication Refill     1. Have you been to the ER, urgent care clinic since your last visit? Hospitalized since your last visit? No    2. Have you seen or consulted any other health care providers outside of the [facility] since your last visit? Include any pap smears or colon screening.  No   Endocrinologist [facility] Due   Topic Date Due    Pneumococcal 19-64 Highest Risk (1 of 3 - PCV13) 09/19/1985    FOBT Q 1 YEAR AGE 50-75  09/19/2016       [facility]  OFFICE PROCEDURE PROGRESS NOTE        Chart reviewed for the following:   Tomás TONG LPN, have reviewed the History, Physical and updated the Allergic reactions for [address] performed immediately prior to start of procedure:   Pallavi TONG LPN, have performed the following reviews on Bunny Og. prior to the start of the procedure:            * Patient was identified by name and date of birth   * Agreement on procedure being performed was verified  * Risks and Benefits explained to the patient  * Procedure site verified and marked as necessary  * Patient was positioned for comfort  * Consent was signed and verified     Time: 5:33pm      Date of procedure: 1/31/2017    Procedure performed by:  Radha Munoz MD    Provider assisted by:     Patient assisted by    How tolerated by patient:  Pt had no complaints according to Dr. Airam Ceballos Procedural Pain Scale: \"0\" according to Dr. Kaushik James    Comments:

## 2017-02-02 ENCOUNTER — TELEPHONE (OUTPATIENT)
Dept: SLEEP MEDICINE | Age: 52
End: 2017-02-02

## 2017-02-02 ENCOUNTER — OFFICE VISIT (OUTPATIENT)
Dept: SLEEP MEDICINE | Age: 52
End: 2017-02-02

## 2017-02-02 VITALS
HEIGHT: 70 IN | WEIGHT: 255 LBS | SYSTOLIC BLOOD PRESSURE: 120 MMHG | HEART RATE: 105 BPM | BODY MASS INDEX: 36.51 KG/M2 | OXYGEN SATURATION: 97 % | TEMPERATURE: 98.5 F | DIASTOLIC BLOOD PRESSURE: 77 MMHG

## 2017-02-02 DIAGNOSIS — G47.33 OBSTRUCTIVE SLEEP APNEA (ADULT) (PEDIATRIC): Primary | ICD-10-CM

## 2017-02-02 DIAGNOSIS — G47.33 OSA (OBSTRUCTIVE SLEEP APNEA): Primary | ICD-10-CM

## 2017-02-02 NOTE — TELEPHONE ENCOUNTER
----- Message from Shannan Brito sent at 2/2/2017 12:43 PM EST -----  Regarding: Needs Order for Study  DME needs newer sleep study; pt is unable to obtain old records; per wife, his apnea is worsening and she is concerned. Can you upload an order so that I can initiate authorization (whether PSG or HST)?

## 2017-02-06 ENCOUNTER — DOCUMENTATION ONLY (OUTPATIENT)
Dept: SLEEP MEDICINE | Age: 52
End: 2017-02-06

## 2017-02-06 ENCOUNTER — TELEPHONE (OUTPATIENT)
Dept: SLEEP MEDICINE | Age: 52
End: 2017-02-06

## 2017-02-06 NOTE — TELEPHONE ENCOUNTER
----- Message from Mara Crawford sent at 2/6/2017  3:38 PM EST -----  Regarding: Call Pt. Pt's wife Aashish Colon) would like for you to give her a call. Wants to know why HST was chosen over PSG. She wanted to give her input and had in depth questions.     [phone]

## 2017-02-13 DIAGNOSIS — G47.33 OSA (OBSTRUCTIVE SLEEP APNEA): Primary | ICD-10-CM

## 2017-02-14 ENCOUNTER — DOCUMENTATION ONLY (OUTPATIENT)
Dept: SLEEP MEDICINE | Age: 52
End: 2017-02-14

## 2017-02-14 ENCOUNTER — TELEPHONE (OUTPATIENT)
Dept: SLEEP MEDICINE | Age: 52
End: 2017-02-14

## 2017-02-14 NOTE — TELEPHONE ENCOUNTER
Patients wife called to inquire about a type 2 HSAT for her , she stated she believes her  has a neurological problem and the type 2 HSATs are the closest to an in lab so she would like to know where they could go to obtain a type 2 HSAT.    She can be reached at [phone]

## 2017-04-18 ENCOUNTER — OFFICE VISIT (OUTPATIENT)
Dept: SLEEP MEDICINE | Age: 52
End: 2017-04-18

## 2017-04-18 VITALS
SYSTOLIC BLOOD PRESSURE: 103 MMHG | WEIGHT: 253 LBS | OXYGEN SATURATION: 97 % | TEMPERATURE: 98.2 F | DIASTOLIC BLOOD PRESSURE: 69 MMHG | BODY MASS INDEX: 36.22 KG/M2 | HEART RATE: 90 BPM | HEIGHT: 70 IN

## 2017-04-18 DIAGNOSIS — G47.33 OSA (OBSTRUCTIVE SLEEP APNEA): Primary | ICD-10-CM

## 2017-04-18 DIAGNOSIS — E66.9 OBESITY (BMI 30-39.9): ICD-10-CM

## 2017-04-18 NOTE — PATIENT INSTRUCTIONS
[address] [facility] [place]. [place], [address]  Tel.  [phone]  Fax. 100 [facility] 60  [place], [address] [facility]  Tel.  [phone]  Fax. [phone] [address] [place]ja [place]ico  Tel.  [phone]  Fax. [phone]     Learning About CPAP for Sleep Apnea  What is CPAP? CPAP is a small machine that you use at home every night while you sleep. It increases air pressure in your throat to keep your airway open. When you have sleep apnea, this can help you sleep better so you feel much better. CPAP stands for \"continuous positive airway pressure. \"  The CPAP machine will have one of the following:  · A mask that covers your nose and mouth  · Prongs that fit into your nose  · A mask that covers your nose only, the most common type. This type is called NCPAP. The N stands for \"nasal.\"  Why is it done? CPAP is usually the best treatment for obstructive sleep apnea. It is the first treatment choice and the most widely used. Your doctor may suggest CPAP if you have:  · Moderate to severe sleep apnea. · Sleep apnea and coronary artery disease (CAD) or heart failure. How does it help? · CPAP can help you have more normal sleep, so you feel less sleepy and more alert during the daytime. · CPAP may help keep heart failure or other heart problems from getting worse. · NCPAP may help lower your blood pressure. · If you use CPAP, your bed partner may also sleep better because you are not snoring or restless. What are the side effects? Some people who use CPAP have:  · A dry or stuffy nose and a sore throat. · Irritated skin on the face. · Sore eyes. · Bloating. If you have any of these problems, work with your doctor to fix them. Here are some things you can try:  · Be sure the mask or nasal prongs fit well. · See if your doctor can adjust the pressure of your CPAP. · If your nose is dry, try a humidifier.   · If your nose is runny or stuffy, try decongestant medicine or a steroid nasal spray. If these things do not help, you might try a different type of machine. Some machines have air pressure that adjusts on its own. Others have air pressures that are different when you breathe in than when you breathe out. This may reduce discomfort caused by too much pressure in your nose. Where can you learn more? Go to nodila.be  Enter Jeni Walker in the search box to learn more about \"Learning About CPAP for Sleep Apnea. \"   © 4367-1316 Healthwise, [employer]. Care instructions adapted under license by Carmen Desai (which disclaims liability or warranty for this information). This care instruction is for use with your licensed healthcare professional. If you have questions about a medical condition or this instruction, always ask your healthcare professional. [address] any warranty or liability for your use of this information. Content Version: 7.9.13752; Last Revised: January 11, 2010  PROPER SLEEP HYGIENE    What to avoid  · Do not have drinks with caffeine, such as coffee or black tea, for 8 hours before bed. · Do not smoke or use other types of tobacco near bedtime. Nicotine is a stimulant and can keep you awake. · Avoid drinking alcohol late in the evening, because it can cause you to wake in the middle of the night. · Do not eat a big meal close to bedtime. If you are hungry, eat a light snack. · Do not drink a lot of water close to bedtime, because the need to urinate may wake you up during the night. · Do not read or watch TV in bed. Use the bed only for sleeping and sexual activity. What to try  · Go to bed at the same time every night, and wake up at the same time every morning. Do not take naps during the day. · Keep your bedroom quiet, dark, and cool. · Get regular exercise, but not within 3 to 4 hours of your bedtime. .  · Sleep on a comfortable pillow and mattress.   · If watching the clock makes you anxious, turn it facing away from you so you cannot see the time. · If you worry when you lie down, start a worry book. Well before bedtime, write down your worries, and then set the book and your concerns aside. · Try meditation or other relaxation techniques before you go to bed. · If you cannot fall asleep, get up and go to another room until you feel sleepy. Do something relaxing. Repeat your bedtime routine before you go to bed again. · Make your house quiet and calm about an hour before bedtime. Turn down the lights, turn off the TV, log off the computer, and turn down the volume on music. This can help you relax after a busy day. Drowsy Driving: The [employer] cites drowsiness as a causing factor in more than 535,561 police reported crashes annually, resulting in 76,000 injuries and 1,500 deaths. Other surveys suggest 55% of people polled have driven while drowsy in the past year, 23% had fallen asleep but not crashed, 3% crashed, and 2% had and accident due to drowsy driving. Who is at risk? Young Drivers: One study of drowsy driving accidents states that 55% of the drivers were under 25 years. Of those, 75% were male. Shift Workers and Travelers: People who work overnight or travel across time zones frequently are at higher risk of experiencing Circadian Rhythm Disorders. They are trying to work and function when their body is programed to sleep. Sleep Deprived: Lack of sleep has a serious impact on your ability to pay attention or focus on a task. Consistently getting less than the average of 8 hours your body needs creates partial or cumulative sleep deprivation. Untreated Sleep Disorders: Sleep Apnea, Narcolepsy, R.L.S., and other sleep disorders (untreated) prevent a person from getting enough restful sleep. This leads to excessive daytime sleepiness and increases the risk for drowsy driving accidents by up to 7 times.   Medications / Alcohol: Even over the counter medications can cause drowsiness. Medications that impair a drivers attention should have a warning label. Alcohol naturally makes you sleepy and on its own can cause accidents. Combined with excessive drowsiness its effects are amplified. Signs of Drowsy Driving:   * You don't remember driving the last few miles   * You may drift out of your nagi   * You are unable to focus and your thoughts wander   * You may yawn more often than normal   * You have difficulty keeping your eyes open / nodding off   * Missing traffic signs, speeding, or tailgating  Prevention-   Good sleep hygiene, lifestyle and behavioral choices have the most impact on drowsy driving. There is no substitute for sleep and the average person requires 8 hours nightly. If you find yourself driving drowsy, stop and sleep. Consider the sleep hygiene tips provided during your visit as well. Medication Refill Policy: Refills for all medications require 1 week advance notice. Please have your pharmacy fax a refill request. We are unable to fax, or call in \"controled substance\" medications and you will need to pick these prescriptions up from our office. [employer] Activation    Thank you for requesting access to [employer]. Please follow the instructions below to securely access and download your online medical record. [employer] allows you to send messages to your doctor, view your test results, renew your prescriptions, schedule appointments, and more. How Do I Sign Up? 1. In your internet browser, go to [email or web address]. 2. Click on the First Time User? Click Here link in the Sign In box. You will see the New Member Sign Up page. 3. Enter your [employer] Access Code exactly as it appears below. You will not need to use this code after youve completed the sign-up process. If you do not sign up before the expiration date, you must request a new code.     [employer] Access Code: [email or web address]  Expires: 2017  4:52 PM (This is the date your "[employer]" access code will )    4. Enter the last four digits of your Social Security Number (xxxx) and Date of Birth (mm/dd/yyyy) as indicated and click Submit. You will be taken to the next sign-up page. 5. Create a "[employer]" ID. This will be your "[employer]" login ID and cannot be changed, so think of one that is secure and easy to remember. 6. Create a "[employer]" password. You can change your password at any time. 7. Enter your Password Reset Question and Answer. This can be used at a later time if you forget your password. 8. Enter your e-mail address. You will receive e-mail notification when new information is available in [address] 9. Click Sign Up. You can now view and download portions of your medical record. 10. Click the Download Summary menu link to download a portable copy of your medical information. Additional Information    If you have questions, please call [phone]. Remember, "[employer]" is NOT to be used for urgent needs. For medical emergencies, dial 911. Starting a Weight Loss Plan: After Your Visit  Your Care Instructions  If you are thinking about losing weight, it can be hard to know where to start. Your doctor can help you set up a weight loss plan that best meets your needs. You may want to take a class on nutrition or exercise, or join a weight loss support group. If you have questions about how to make changes to your eating or exercise habits, ask your doctor about seeing a registered dietitian or an exercise specialist.  It can be a big challenge to lose weight. But you do not have to make huge changes at once. Make small changes, and stick with them. When those changes become habit, add a few more changes. If you do not think you are ready to make changes right now, try to pick a date in the future.  Make an appointment to see your doctor to discuss whether the time is right for you to start a plan.  Follow-up care is a key part of your treatment and safety. Be sure to make and go to all appointments, and call your doctor if you are having problems. It's also a good idea to know your test results and keep a list of the medicines you take. How can you care for yourself at home? · Set realistic goals. Many people expect to lose much more weight than is likely. A weight loss of 5% to 10% of your body weight may be enough to improve your health. · Get family and friends involved to provide support. Talk to them about why you are trying to lose weight, and ask them to help. They can help by participating in exercise and having meals with you, even if they may be eating something different. · Find what works best for you. If you do not have time or do not like to cook, a program that offers meal replacement bars or shakes may be better for you. Or if you like to prepare meals, finding a plan that includes daily menus and recipes may be best.  · Ask your doctor about other health professionals who can help you achieve your weight loss goals. ¨ A dietitian can help you make healthy changes in your diet. ¨ An exercise specialist or  can help you develop a safe and effective exercise program.  ¨ A counselor or psychiatrist can help you cope with issues such as depression, anxiety, or family problems that can make it hard to focus on weight loss. · Consider joining a support group for people who are trying to lose weight. Your doctor can suggest groups in your area. Where can you learn more? Go to [employer].be  Enter U357 in the search box to learn more about \"Starting a Weight Loss Plan: After Your Visit. \"   © 6813-8840 Healthwise, Incorporated. Care instructions adapted under license by Nano Guzman (which disclaims liability or warranty for this information).  This care instruction is for use with your licensed healthcare professional. If you have questions about a medical condition or this instruction, always ask your healthcare professional. [address] any warranty or liability for your use of this information.   Content Version: 7.7.01852; Last Revised: September 1, 2009

## 2017-04-18 NOTE — PROGRESS NOTES
[address] [facility] [place]. [place], [address]  Tel.  [phone]  Fax. 100 [facility] 60  [place], [address] [facility]  Tel.  [phone]  Fax. [phone] [address] [address] [place]ja Damico  Tel.  [phone]  Fax. [phone]     S>Michele Troncoso is a 49 y.o. male seen for a positive airway pressure follow-up. He reports no problems using the device. The following problems are identified:    Drowsiness no Problems exhaling no   Snoring no Forget to put on no   Mask Comfortable yes Can't fall asleep no   Dry Mouth no Mask falls off no   Air Leaking no Frequent awakenings no         He admits that his sleep has improved. Therapy Apnea Index averaged over PAP use: 1.8 /hr which reflects improved sleep breathing condition. No Known Allergies    He has a current medication list which includes the following prescription(s): amphetamine-dextroamphetamine xr, olmesartan-hydrochlorothiazide, atomoxetine, tadalafil, levothyroxine, promethazine-dextromethorphan, testosterone cypionate, promethazine-codeine, and naproxen. Tasneem Michaels He  has a past medical history of Hypertension; Sleep apnea; and Thyroid disease. [place] Sleepiness Score: 9   and Modified F.O.S.Q. Score Total / 2: 17   which reflect improved sleep quality over therapy time. O>    Visit Vitals    /69    Pulse 90    Temp 98.2 °F (36.8 °C)    Ht 5' 10\" (1.778 m)    Wt 253 lb (114.8 kg)    SpO2 97%    BMI 36.3 kg/m2           General:   Alert, oriented, not in distress   Neck:   No JVD    Chest/Lungs:  symetrical lung expansion , no accessory muscle use    Extremities:  no obvious rashes , negative edema    Neuro:  No focal deficits ; No obvious tremor    Psych:  Normal affect ,  Normal countenance ;           A>    ICD-10-CM ICD-9-CM    1. ASHLY (obstructive sleep apnea) G47.33 327.23 AMB SUPPLY ORDER   2. Obesity (BMI 30-39. 9) E66.9 278.00      AHI = 39 (2017).   On CPAP :  5-15 cmH2O. Compliant:      yes    Therapeutic Response:  Positive    P>    Orders Placed This Encounter    AMB SUPPLY ORDER     * Nocturnal Oxygen Study on current PAP settings    Breanne Calderon M.D.  (electronically signed)  Diplomate in Sleep Medicine, [facility]       * Follow-up Disposition:  Return for call with results. * He was asked to contact our office for any problems regarding PAP therapy. * Counseling was provided regarding the importance of regular PAP use and on proper sleep hygiene and safe driving. * Re-enforced proper and regular cleaning for the device. I have reviewed/discussed the above normal BMI with the patient. I have recommended the following interventions: dietary management education, guidance, and counseling . Jovanny Eduardo Thank you for allowing us to participate in your patient's medical care.     Breanne Calderon M.D.  (electronically signed)  Diplomate in Sleep Medicine, [facility]

## 2017-04-18 NOTE — MR AVS SNAPSHOT
Visit Information Date & Time Provider Department Dept. Phone Encounter #  
 4/18/2017  4:20 PM Kenny Adams, Cezar Toure [id number] Follow-up Instructions Return for call with results. Follow-up and Disposition History Your Appointments 4/27/2017  3:45 PM  
ROUTINE CARE with Nena Castro MD  
[facility]) Appt Note: follow up routine 3 month  
 [address] [id number] [phone] [address] [facility] [address] Upcoming Health Maintenance Date Due Pneumococcal 19-64 Highest Risk (1 of 3 - PCV13) 9/19/1985 FOBT Q 1 YEAR AGE 50-75 9/19/2016 DTaP/Tdap/Td series (2 - Td) 9/26/2022 Allergies as of 4/18/2017  Review Complete On: 4/18/2017 By: Kenny Adams MD  
 No Known Allergies Current Immunizations  Reviewed on 11/24/2015 Name Date Influenza Vaccine 10/17/2013 TB Skin Test (PPD) Intradermal 11/24/2015 TDAP Vaccine 9/26/2012 Not reviewed this visit You Were Diagnosed With   
  
 Codes Comments ASHLY (obstructive sleep apnea)    -  Primary ICD-10-CM: G47.33 
ICD-9-CM: 327.23 Obesity (BMI 30-39. 9)     ICD-10-CM: E66.9 ICD-9-CM: 278.00 Vitals BP Pulse Temp Height(growth percentile) Weight(growth percentile) SpO2  
 103/69 90 98.2 °F (36.8 °C) 5' 10\" (1.778 m) 253 lb (114.8 kg) 97% BMI Smoking Status 36.3 kg/m2 Never Smoker BMI and BSA Data Body Mass Index Body Surface Area  
 36.3 kg/m 2 2.38 m 2 Preferred Pharmacy Pharmacy Name Phone [phone] [phone] Your Updated Medication List  
  
   
This list is accurate as of: 4/18/17  4:57 PM.  Always use your most recent med list.  
  
  
  
  
 amphetamine-dextroamphetamine XR 20 mg XR capsule Commonly known as:  ADDERALL XR Take 2 Caps (40 mg total) by mouth every morning. Max Daily Amount: 40 mg  
  
 atomoxetine 40 mg capsule Commonly known as:  STRATTERA Take 1 Cap by mouth daily. naproxen 500 mg tablet Commonly known as:  NAPROSYN Take 1 Tab by mouth two (2) times daily (with meals). For back pain  
  
 olmesartan-hydroCHLOROthiazide 20-12.5 mg per tablet Commonly known as:  BENICAR HCT  
TAKE 1 TABLET BY MOUTH DAILY FOR BLOOD PRESSURE  
  
 promethazine-codeine 6.25-10 mg/5 mL syrup Commonly known as:  PHENERGAN with CODEINE Take 5 mL by mouth every six (6) hours as needed for Cough. Max Daily Amount: 20 mL. promethazine-dextromethorphan 6.25-15 mg/5 mL syrup Commonly known as:  PROMETHAZINE-DM Take 5 mL by mouth four (4) times daily as needed for Cough. SYNTHROID 300 mcg tablet Generic drug:  levothyroxine Take  by mouth daily (before breakfast). tadalafil 20 mg tablet Commonly known as:  CIALIS Take 1 Tab by mouth as needed. 1/2 - 1 tab one hour before sex on an empty stomach  
  
 testosterone cypionate 200 mg/mL injection Commonly known as:  DEPOTESTOTERONE CYPIONATE We Performed the Following AMB SUPPLY ORDER [[id number] Custom] Comments: * Nocturnal Oxygen Study on current PAP settings Nayeli Davison M.D.  (electronically signed) Diplomate in Sleep Medicine, [employer]IM Follow-up Instructions Return for call with results. Patient Instructions [address] [facility], [address], [facility] [address] Tel.  [phone] Fax. 100 [facility] [address] [place], 200 [facility] Tel.  [phone] Fax. [phone] [address] [place] [address] Marija Damico  Tel.  [phone] Fax. [phone] Learning About CPAP for Sleep Apnea What is CPAP?  
            CPAP is a small machine that you use at home every night while you sleep. It increases air pressure in your throat to keep your airway open. When you have sleep apnea, this can help you sleep better so you feel much better. CPAP stands for \"continuous positive airway pressure. \" The CPAP machine will have one of the following: · A mask that covers your nose and mouth · Prongs that fit into your nose · A mask that covers your nose only, the most common type. This type is called NCPAP. The N stands for \"nasal.\" Why is it done? CPAP is usually the best treatment for obstructive sleep apnea. It is the first treatment choice and the most widely used. Your doctor may suggest CPAP if you have: · Moderate to severe sleep apnea. · Sleep apnea and coronary artery disease (CAD) or heart failure. How does it help? · CPAP can help you have more normal sleep, so you feel less sleepy and more alert during the daytime. · CPAP may help keep heart failure or other heart problems from getting worse. · NCPAP may help lower your blood pressure. · If you use CPAP, your bed partner may also sleep better because you are not snoring or restless. What are the side effects? Some people who use CPAP have: · A dry or stuffy nose and a sore throat. · Irritated skin on the face. · Sore eyes. · Bloating. If you have any of these problems, work with your doctor to fix them. Here are some things you can try: · Be sure the mask or nasal prongs fit well. · See if your doctor can adjust the pressure of your CPAP. · If your nose is dry, try a humidifier. · If your nose is runny or stuffy, try decongestant medicine or a steroid nasal spray. If these things do not help, you might try a different type of machine. Some machines have air pressure that adjusts on its own. Others have air pressures that are different when you breathe in than when you breathe out. This may reduce discomfort caused by too much pressure in your nose. Where can you learn more? Go to [employer].be Enter O473 in the search box to learn more about \"Learning About CPAP for Sleep Apnea. \"  
© 2942-2955 Healthwise, Incorporated. Care instructions adapted under license by Julia Tracy (which disclaims liability or warranty for this information). This care instruction is for use with your licensed healthcare professional. If you have questions about a medical condition or this instruction, always ask your healthcare professional. [place] [address] any warranty or liability for your use of this information. Content Version: 9.2.93396; Last Revised: January 11, 2010 PROPER SLEEP HYGIENE What to avoid · Do not have drinks with caffeine, such as coffee or black tea, for 8 hours before bed. · Do not smoke or use other types of tobacco near bedtime. Nicotine is a stimulant and can keep you awake. · Avoid drinking alcohol late in the evening, because it can cause you to wake in the middle of the night. · Do not eat a big meal close to bedtime. If you are hungry, eat a light snack. · Do not drink a lot of water close to bedtime, because the need to urinate may wake you up during the night. · Do not read or watch TV in bed. Use the bed only for sleeping and sexual activity. What to try · Go to bed at the same time every night, and wake up at the same time every morning. Do not take naps during the day. · Keep your bedroom quiet, dark, and cool. · Get regular exercise, but not within 3 to 4 hours of your bedtime. Endy Garcia · Sleep on a comfortable pillow and mattress. · If watching the clock makes you anxious, turn it facing away from you so you cannot see the time. · If you worry when you lie down, start a worry book. Well before bedtime, write down your worries, and then set the book and your concerns aside. · Try meditation or other relaxation techniques before you go to bed.  
· If you cannot fall asleep, get up and go to another room until you feel sleepy. Do something relaxing. Repeat your bedtime routine before you go to bed again. · Make your house quiet and calm about an hour before bedtime. Turn down the lights, turn off the TV, log off the computer, and turn down the volume on music. This can help you relax after a busy day. Drowsy Driving: The [place] [address] cites drowsiness as a causing factor in more than 668,821 police reported crashes annually, resulting in 76,000 injuries and 1,500 deaths. Other surveys suggest 55% of people polled have driven while drowsy in the past year, 23% had fallen asleep but not crashed, 3% crashed, and 2% had and accident due to drowsy driving. Who is at risk? Young Drivers: One study of drowsy driving accidents states that 55% of the drivers were under 25 years. Of those, 75% were male. Shift Workers and Travelers: People who work overnight or travel across time zones frequently are at higher risk of experiencing Circadian Rhythm Disorders. They are trying to work and function when their body is programed to sleep. Sleep Deprived: Lack of sleep has a serious impact on your ability to pay attention or focus on a task. Consistently getting less than the average of 8 hours your body needs creates partial or cumulative sleep deprivation. Untreated Sleep Disorders: Sleep Apnea, Narcolepsy, R.L.S., and other sleep disorders (untreated) prevent a person from getting enough restful sleep. This leads to excessive daytime sleepiness and increases the risk for drowsy driving accidents by up to 7 times. Medications / Alcohol: Even over the counter medications can cause drowsiness. Medications that impair a drivers attention should have a warning label. Alcohol naturally makes you sleepy and on its own can cause accidents. Combined with excessive drowsiness its effects are amplified. Signs of Drowsy Driving: * You don't remember driving the last few miles * You may drift out of your nagi * You are unable to focus and your thoughts wander * You may yawn more often than normal 
 * You have difficulty keeping your eyes open / nodding off * Missing traffic signs, speeding, or tailgating Prevention-  
Good sleep hygiene, lifestyle and behavioral choices have the most impact on drowsy driving. There is no substitute for sleep and the average person requires 8 hours nightly. If you find yourself driving drowsy, stop and sleep. Consider the sleep hygiene tips provided during your visit as well. Medication Refill Policy: Refills for all medications require 1 week advance notice. Please have your pharmacy fax a refill request. We are unable to fax, or call in \"controled substance\" medications and you will need to pick these prescriptions up from our office. Wochit Activation Thank you for requesting access to Wochit. Please follow the instructions below to securely access and download your online medical record. Wochit allows you to send messages to your doctor, view your test results, renew your prescriptions, schedule appointments, and more. How Do I Sign Up? 1. In your internet browser, go to [email or web address]. 2. Click on the First Time User? Click Here link in the Sign In box. You will see the New Member Sign Up page. 3. Enter your Wochit Access Code exactly as it appears below. You will not need to use this code after youve completed the sign-up process. If you do not sign up before the expiration date, you must request a new code. Wochit Access Code: [email or web address] Expires: 2017  4:52 PM (This is the date your Wochit access code will ) 4. Enter the last four digits of your Social Security Number (xxxx) and Date of Birth (mm/dd/yyyy) as indicated and click Submit. You will be taken to the next sign-up page. 5. Create a Wochit ID.  This will be your Wochit login ID and cannot be changed, so think of one that is secure and easy to remember. 6. Create a COTA Track password. You can change your password at any time. 7. Enter your Password Reset Question and Answer. This can be used at a later time if you forget your password. 8. Enter your e-mail address. You will receive e-mail notification when new information is available in [address] 9. Click Sign Up. You can now view and download portions of your medical record. 10. Click the Download Summary menu link to download a portable copy of your medical information. Additional Information If you have questions, please call [phone]. Remember, COTA Track is NOT to be used for urgent needs. For medical emergencies, dial 911. Starting a Weight Loss Plan: After Your Visit Your Care Instructions If you are thinking about losing weight, it can be hard to know where to start. Your doctor can help you set up a weight loss plan that best meets your needs. You may want to take a class on nutrition or exercise, or join a weight loss support group. If you have questions about how to make changes to your eating or exercise habits, ask your doctor about seeing a registered dietitian or an exercise specialist. 
It can be a big challenge to lose weight. But you do not have to make huge changes at once. Make small changes, and stick with them. When those changes become habit, add a few more changes. If you do not think you are ready to make changes right now, try to pick a date in the future. Make an appointment to see your doctor to discuss whether the time is right for you to start a plan. Follow-up care is a key part of your treatment and safety. Be sure to make and go to all appointments, and call your doctor if you are having problems. It's also a good idea to know your test results and keep a list of the medicines you take. How can you care for yourself at home? · Set realistic goals.  Many people expect to lose much more weight than is likely. A weight loss of 5% to 10% of your body weight may be enough to improve your health. · Get family and friends involved to provide support. Talk to them about why you are trying to lose weight, and ask them to help. They can help by participating in exercise and having meals with you, even if they may be eating something different. · Find what works best for you. If you do not have time or do not like to cook, a program that offers meal replacement bars or shakes may be better for you. Or if you like to prepare meals, finding a plan that includes daily menus and recipes may be best. 
· Ask your doctor about other health professionals who can help you achieve your weight loss goals. ¨ A dietitian can help you make healthy changes in your diet. ¨ An exercise specialist or  can help you develop a safe and effective exercise program. 
¨ A counselor or psychiatrist can help you cope with issues such as depression, anxiety, or family problems that can make it hard to focus on weight loss. · Consider joining a support group for people who are trying to lose weight. Your doctor can suggest groups in your area. Where can you learn more? Go to MyPerfectGift.com.be Enter Q814 in the search box to learn more about \"Starting a Weight Loss Plan: After Your Visit. \"  
© 6279-2715 Healthwise, Incorporated. Care instructions adapted under license by Jocelin Monroe (which disclaims liability or warranty for this information). This care instruction is for use with your licensed healthcare professional. If you have questions about a medical condition or this instruction, always ask your healthcare professional. [place] [address] any warranty or liability for your use of this information. Content Version: 6.1.24429; Last Revised: September 1, 2009 Introducing [facility] & HEALTH SERVICES! [facility] introduces Gymbox patient portal. Now you can access parts of your medical record, email your doctor's office, and request medication refills online. 1. In your internet browser, go to [email or web address] 2. Click on the First Time User? Click Here link in the Sign In box. You will see the New Member Sign Up page. 3. Enter your Gymbox Access Code exactly as it appears below. You will not need to use this code after youve completed the sign-up process. If you do not sign up before the expiration date, you must request a new code. · Gymbox Access Code: [email or web address] Expires: 7/17/2017  4:52 PM 
 
4. Enter the last four digits of your Social Security Number (xxxx) and Date of Birth (mm/dd/yyyy) as indicated and click Submit. You will be taken to the next sign-up page. 5. Create a Gymbox ID. This will be your Gymbox login ID and cannot be changed, so think of one that is secure and easy to remember. 6. Create a Gymbox password. You can change your password at any time. 7. Enter your Password Reset Question and Answer. This can be used at a later time if you forget your password. 8. Enter your e-mail address. You will receive e-mail notification when new information is available in [address] 9. Click Sign Up. You can now view and download portions of your medical record. 10. Click the Download Summary menu link to download a portable copy of your medical information. If you have questions, please visit the Frequently Asked Questions section of the Gymbox website. Remember, Gymbox is NOT to be used for urgent needs. For medical emergencies, dial 911. Now available from your iPhone and Android! Please provide this summary of care documentation to your next provider. Your primary care clinician is listed as Gin Rodriguez. If you have any questions after today's visit, please call [phone].

## 2017-04-19 ENCOUNTER — DOCUMENTATION ONLY (OUTPATIENT)
Dept: SLEEP MEDICINE | Age: 52
End: 2017-04-19

## 2017-05-05 ENCOUNTER — DOCUMENTATION ONLY (OUTPATIENT)
Dept: SLEEP MEDICINE | Age: 52
End: 2017-05-05

## 2017-05-08 ENCOUNTER — DOCUMENTATION ONLY (OUTPATIENT)
Dept: SLEEP MEDICINE | Age: 52
End: 2017-05-08

## 2017-05-08 NOTE — PROGRESS NOTES
Confirmed with [facility] today - Nocturnal oxygen orders received and patient will be contacted once equipment is available. Patient informed.

## 2017-06-06 ENCOUNTER — OFFICE VISIT (OUTPATIENT)
Dept: FAMILY MEDICINE CLINIC | Age: 52
End: 2017-06-06

## 2017-06-06 ENCOUNTER — TELEPHONE (OUTPATIENT)
Dept: SLEEP MEDICINE | Age: 52
End: 2017-06-06

## 2017-06-06 VITALS
HEART RATE: 94 BPM | BODY MASS INDEX: 36.65 KG/M2 | SYSTOLIC BLOOD PRESSURE: 112 MMHG | TEMPERATURE: 98.4 F | RESPIRATION RATE: 14 BRPM | WEIGHT: 256 LBS | DIASTOLIC BLOOD PRESSURE: 80 MMHG | HEIGHT: 70 IN | OXYGEN SATURATION: 96 %

## 2017-06-06 DIAGNOSIS — Z12.11 ENCOUNTER FOR SCREENING FECAL OCCULT BLOOD TESTING: Primary | ICD-10-CM

## 2017-06-06 RX ORDER — DEXTROAMPHETAMINE SACCHARATE, AMPHETAMINE ASPARTATE MONOHYDRATE, DEXTROAMPHETAMINE SULFATE AND AMPHETAMINE SULFATE 5; 5; 5; 5 MG/1; MG/1; MG/1; MG/1
40 CAPSULE, EXTENDED RELEASE ORAL
Qty: 60 CAP | Refills: 0 | Status: SHIPPED | OUTPATIENT
Start: 2017-06-06 | End: 2017-08-31 | Stop reason: SDUPTHER

## 2017-06-06 RX ORDER — ATOMOXETINE HYDROCHLORIDE 40 MG/1
CAPSULE ORAL
COMMUNITY
Start: 2017-06-02 | End: 2017-06-06 | Stop reason: ALTCHOICE

## 2017-06-06 RX ORDER — LEVOTHYROXINE SODIUM 200 UG/1
200 TABLET ORAL DAILY
Refills: 0 | COMMUNITY
Start: 2017-03-16

## 2017-06-06 NOTE — MR AVS SNAPSHOT
Visit Information Date & Time Provider Department Dept. Phone Encounter #  
 6/6/2017  3:45 PM Shanna Prieto MD [facility] [phone] [id number] Upcoming Health Maintenance Date Due Pneumococcal 19-64 Highest Risk (1 of 3 - PCV13) 9/19/1985 FOBT Q 1 YEAR AGE 50-75 9/19/2016 INFLUENZA AGE 9 TO ADULT 8/1/2017 DTaP/Tdap/Td series (2 - Td) 9/26/2022 Allergies as of 6/6/2017  Review Complete On: 6/6/2017 By: Shanna Prieto MD  
 No Known Allergies Current Immunizations  Reviewed on 11/24/2015 Name Date Influenza Vaccine 10/17/2013 TB Skin Test (PPD) Intradermal 11/24/2015 TDAP Vaccine 9/26/2012 Not reviewed this visit You Were Diagnosed With   
  
 Codes Comments Encounter for screening fecal occult blood testing    -  Primary ICD-10-CM: Z12.11 ICD-9-CM: V76.51 Vitals BP Pulse Temp Resp Height(growth percentile) Weight(growth percentile) 112/80 94 98.4 °F (36.9 °C) (Oral) 14 5' 10\" (1.778 m) 256 lb (116.1 kg) SpO2 BMI Smoking Status 96% 36.73 kg/m2 Never Smoker BMI and BSA Data Body Mass Index Body Surface Area  
 36.73 kg/m 2 2.39 m 2 Preferred Pharmacy Pharmacy Name Phone [phone] [phone] Your Updated Medication List  
  
   
This list is accurate as of: 6/6/17  4:45 PM.  Always use your most recent med list.  
  
  
  
  
 amphetamine-dextroamphetamine XR 20 mg XR capsule Commonly known as:  ADDERALL XR Take 2 Caps (40 mg total) by mouth every morning. Max Daily Amount: 40 mg  
  
 atomoxetine 40 mg capsule Commonly known as:  STRATTERA Take 1 Cap by mouth daily. olmesartan-hydroCHLOROthiazide 20-12.5 mg per tablet Commonly known as:  BENICAR HCT  
TAKE 1 TABLET BY MOUTH DAILY FOR BLOOD PRESSURE  
  
 promethazine-dextromethorphan 6.25-15 mg/5 mL syrup Commonly known as:  PROMETHAZINE-DM Take 5 mL by mouth four (4) times daily as needed for Cough. tadalafil 20 mg tablet Commonly known as:  CIALIS Take 1 Tab by mouth as needed. 1/2 - 1 tab one hour before sex on an empty stomach UNITHROID 200 mcg tablet Generic drug:  levothyroxine TAKE ONE TABLET BY MOUTH DAILY Prescriptions Printed Refills  
 amphetamine-dextroamphetamine XR (ADDERALL XR) 20 mg XR capsule 0 Sig: Take 2 Caps (40 mg total) by mouth every morning. Max Daily Amount: 40 mg  
 Class: Print Route: Oral  
  
Introducing [facility] & HEALTH SERVICES! [employer] introduces Calpano patient portal. Now you can access parts of your medical record, email your doctor's office, and request medication refills online. 1. In your internet browser, go to [email or web address]“ [email or web address] 2. Click on the First Time User? Click Here link in the Sign In box. You will see the New Member Sign Up page. 3. Enter your Calpano Access Code exactly as it appears below. You will not need to use this code after youve completed the sign-up process. If you do not sign up before the expiration date, you must request a new code. · Calpano Access Code: [email or web address] Expires: 7/17/2017  4:52 PM 
 
4. Enter the last four digits of your Social Security Number (xxxx) and Date of Birth (mm/dd/yyyy) as indicated and click Submit. You will be taken to the next sign-up page. 5. Create a Calpano ID. This will be your Calpano login ID and cannot be changed, so think of one that is secure and easy to remember. 6. Create a Calpano password. You can change your password at any time. 7. Enter your Password Reset Question and Answer. This can be used at a later time if you forget your password. 8. Enter your e-mail address. You will receive e-mail notification when new information is available in [address]. 9. Click Sign Up. You can now view and download portions of your medical record. 10. Click the Download Summary menu link to download a portable copy of your medical information. If you have questions, please visit the Frequently Asked Questions section of the Balloon website. Remember, Balloon is NOT to be used for urgent needs. For medical emergencies, dial 911. Now available from your iPhone and Android! Please provide this summary of care documentation to your next provider. Your primary care clinician is listed as Bouchra Benoit. If you have any questions after today's visit, please call [phone].

## 2017-08-30 ENCOUNTER — TELEPHONE (OUTPATIENT)
Dept: FAMILY MEDICINE CLINIC | Age: 52
End: 2017-08-30

## 2017-08-30 NOTE — TELEPHONE ENCOUNTER
----- Message from Mana Pierre sent at 8/30/2017 12:47 PM EDT -----  Regarding: Dr. Radhika Wu: [phone]  Pt is requesting a call from Dr. Sarah Hutchins. The best contact number is [phone].

## 2017-08-30 NOTE — TELEPHONE ENCOUNTER
----- Message from Alea Arreguin sent at 8/30/2017  1:15 PM EDT -----  Regarding: Dr. Jes Cui would like to see if she can get her  in sooner then 9/6 for back pain.  Contact is [phone]

## 2017-08-31 ENCOUNTER — OFFICE VISIT (OUTPATIENT)
Dept: FAMILY MEDICINE CLINIC | Age: 52
End: 2017-08-31

## 2017-08-31 VITALS
HEART RATE: 89 BPM | RESPIRATION RATE: 14 BRPM | OXYGEN SATURATION: 97 % | WEIGHT: 252.8 LBS | SYSTOLIC BLOOD PRESSURE: 98 MMHG | BODY MASS INDEX: 36.19 KG/M2 | DIASTOLIC BLOOD PRESSURE: 60 MMHG | TEMPERATURE: 97 F | HEIGHT: 70 IN

## 2017-08-31 DIAGNOSIS — Z12.11 ENCOUNTER FOR SCREENING FECAL OCCULT BLOOD TESTING: Primary | ICD-10-CM

## 2017-08-31 DIAGNOSIS — M54.50 ACUTE BILATERAL LOW BACK PAIN WITHOUT SCIATICA: ICD-10-CM

## 2017-08-31 RX ORDER — NAPROXEN 500 MG/1
500 TABLET ORAL 2 TIMES DAILY WITH MEALS
Qty: 30 TAB | Refills: 2 | Status: SHIPPED | OUTPATIENT
Start: 2017-08-31 | End: 2018-09-05 | Stop reason: ALTCHOICE

## 2017-08-31 RX ORDER — ATOMOXETINE 40 MG/1
40 CAPSULE ORAL DAILY
Qty: 30 CAP | Refills: 12 | Status: SHIPPED | OUTPATIENT
Start: 2017-08-31 | End: 2018-03-02 | Stop reason: SDUPTHER

## 2017-08-31 RX ORDER — DEXTROAMPHETAMINE SACCHARATE, AMPHETAMINE ASPARTATE MONOHYDRATE, DEXTROAMPHETAMINE SULFATE AND AMPHETAMINE SULFATE 5; 5; 5; 5 MG/1; MG/1; MG/1; MG/1
40 CAPSULE, EXTENDED RELEASE ORAL
Qty: 60 CAP | Refills: 0 | Status: SHIPPED | OUTPATIENT
Start: 2017-08-31 | End: 2018-06-05 | Stop reason: SDUPTHER

## 2017-08-31 RX ORDER — CYCLOBENZAPRINE HCL 10 MG
TABLET ORAL
Qty: 30 TAB | Refills: 1 | Status: SHIPPED | OUTPATIENT
Start: 2017-08-31 | End: 2018-09-05 | Stop reason: ALTCHOICE

## 2017-08-31 RX ORDER — ATOMOXETINE 40 MG/1
CAPSULE ORAL
COMMUNITY
Start: 2017-06-02 | End: 2018-03-02 | Stop reason: SDUPTHER

## 2017-08-31 NOTE — PROGRESS NOTES
HISTORY OF PRESENT ILLNESS  Hunter Catalan is a 49 y.o. male. HPISeveral day hx low back pain, bilaterally. Pains are worse if takes a deep breath. No radiation of pain into legs. No numbness or weakness in legs. Tylenol- didn't work that well. Has had some back pain in the past. No hematuria, difficulty voiding. ROS    Physical Exam   Constitutional: He appears well-developed and well-nourished. HENT:   Right Ear: External ear normal.   Left Ear: External ear normal.   Mouth/Throat: Oropharynx is clear and moist.   Neck: No thyromegaly present. Cardiovascular: Normal rate, regular rhythm, normal heart sounds and intact distal pulses. Pulmonary/Chest: Effort normal and breath sounds normal. No respiratory distress. He has no wheezes. Abdominal: Soft. Bowel sounds are normal. He exhibits no distension and no mass. There is no tenderness. There is no guarding. Musculoskeletal: Normal range of motion. He exhibits no edema. Back- tenderness paravertebral lumbar muscles. SLR negative bilaterally. DTRS, EHL = bilaterally   Lymphadenopathy:     He has no cervical adenopathy. Nursing note and vitals reviewed. ASSESSMENT and PLAN  Orders Placed This Encounter    amphetamine-dextroamphetamine XR (ADDERALL XR) 20 mg XR capsule    naproxen (NAPROSYN) 500 mg tablet    cyclobenzaprine (FLEXERIL) 10 mg tablet    atomoxetine (STRATTERA) 40 mg capsule     Diagnoses and all orders for this visit:    1. Encounter for screening fecal occult blood testing    2. Acute bilateral low back pain without sciatica    Other orders  -     amphetamine-dextroamphetamine XR (ADDERALL XR) 20 mg XR capsule; Take 2 Caps (40 mg total) by mouth every morningEarliest Fill Date: 8/31/17. Max Daily Amount: 40 mg  -     naproxen (NAPROSYN) 500 mg tablet; Take 1 Tab by mouth two (2) times daily (with meals). -     cyclobenzaprine (FLEXERIL) 10 mg tablet;  One every 8 hours as needed for muscle spasms  - atomoxetine (STRATTERA) 40 mg capsule; Take 1 Cap by mouth daily.       Follow-up Disposition: Not on File

## 2017-08-31 NOTE — PROGRESS NOTES
Chief Complaint   Patient presents with    LOW BACK PAIN     1. Have you been to the ER, urgent care clinic since your last visit? Hospitalized since your last visit? No    2. Have you seen or consulted any other health care providers outside of the [facility] since your last visit? Include any pap smears or colon screening.  No     Health Maintenance Due   Topic Date Due    Pneumococcal 19-64 Highest Risk (1 of 3 - PCV13) 09/19/1985    FOBT Q 1 YEAR AGE 50-75  09/19/2016

## 2017-08-31 NOTE — LETTER
NOTIFICATION RETURN TO WORK / SCHOOL 
 
8/31/2017 1:16 PM 
 
Mr. [address] [address] To Whom It May Concern: 
 
Donovan Aase is currently under the care of [facility]ir[facility]. He will return to work/school on: 9-2-2017. If there are questions or concerns please have the patient contact our office.  
 
 
 
Sincerely, 
 
 
Tiffany Travis MD

## 2017-08-31 NOTE — MR AVS SNAPSHOT
Visit Information Date & Time Provider Department Dept. Phone Encounter #  
 8/31/2017 12:30 PM Yohan Rodriguez MD [facility] [phone] [id number] Your Appointments 9/6/2017  3:45 PM  
ROUTINE CARE with Yohan Rodriguez MD  
[facility] [address]) Appt Note: routine follow up  
 [address] [place] [address] [place]  [address] [phone] [address] [facility] [address] Upcoming Health Maintenance Date Due Pneumococcal 19-64 Highest Risk (1 of 3 - PCV13) 9/19/1985 FOBT Q 1 YEAR AGE 50-75 9/19/2016 INFLUENZA AGE 9 TO ADULT 10/31/2017* DTaP/Tdap/Td series (2 - Td) 9/26/2022 *Topic was postponed. The date shown is not the original due date. Allergies as of 8/31/2017  Review Complete On: 8/31/2017 By: Yohan Rodriguez MD  
 No Known Allergies Current Immunizations  Reviewed on 11/24/2015 Name Date Influenza Vaccine 10/17/2013 TB Skin Test (PPD) Intradermal 11/24/2015 TDAP Vaccine 9/26/2012 Not reviewed this visit You Were Diagnosed With   
  
 Codes Comments Encounter for screening fecal occult blood testing    -  Primary ICD-10-CM: Z12.11 ICD-9-CM: V76.51 Acute bilateral low back pain without sciatica     ICD-10-CM: M54.5 ICD-9-CM: 724.2, 338.19 Vitals BP Pulse Temp Resp Height(growth percentile) Weight(growth percentile) 98/60 89 97 °F (36.1 °C) (Oral) 14 5' 10\" (1.778 m) 252 lb 12.8 oz (114.7 kg) SpO2 BMI Smoking Status 97% 36.27 kg/m2 Never Smoker BMI and BSA Data Body Mass Index Body Surface Area  
 36.27 kg/m 2 2.38 m 2 Preferred Pharmacy Pharmacy Name Phone [id number] [facility] [phone] Your Updated Medication List  
  
   
This list is accurate as of: 8/31/17  1:17 PM.  Always use your most recent med list.  
 amphetamine-dextroamphetamine XR 20 mg XR capsule Commonly known as:  ADDERALL XR Take 2 Caps (40 mg total) by mouth every morningEarliest Fill Date: 8/31/17. Max Daily Amount: 40 mg  
  
 * atomoxetine 40 mg capsule Commonly known as:  STRATTERA Take 1 Cap by mouth daily. * atomoxetine 40 mg capsule Commonly known as:  STRATTERA  
  
 cyclobenzaprine 10 mg tablet Commonly known as:  FLEXERIL One every 8 hours as needed for muscle spasms  
  
 naproxen 500 mg tablet Commonly known as:  NAPROSYN Take 1 Tab by mouth two (2) times daily (with meals). olmesartan-hydroCHLOROthiazide 20-12.5 mg per tablet Commonly known as:  BENICAR HCT  
TAKE 1 TABLET BY MOUTH DAILY FOR BLOOD PRESSURE  
  
 promethazine-dextromethorphan 6.25-15 mg/5 mL syrup Commonly known as:  PROMETHAZINE-DM Take 5 mL by mouth four (4) times daily as needed for Cough. tadalafil 20 mg tablet Commonly known as:  CIALIS Take 1 Tab by mouth as needed. 1/2 - 1 tab one hour before sex on an empty stomach UNITHROID 200 mcg tablet Generic drug:  levothyroxine TAKE ONE TABLET BY MOUTH DAILY * Notice: This list has 2 medication(s) that are the same as other medications prescribed for you. Read the directions carefully, and ask your doctor or other care provider to review them with you. Prescriptions Printed Refills  
 amphetamine-dextroamphetamine XR (ADDERALL XR) 20 mg XR capsule 0 Sig: Take 2 Caps (40 mg total) by mouth every morningEarliest Fill Date: 8/31/17. Max Daily Amount: 40 mg  
 Class: Print Route: Oral  
  
Prescriptions Sent to Pharmacy Refills  
 naproxen (NAPROSYN) 500 mg tablet 2 Sig: Take 1 Tab by mouth two (2) times daily (with meals). Class: Normal  
 Pharmacy: [address] #: [phone]  Route: Oral  
 cyclobenzaprine (FLEXERIL) 10 mg tablet 1  
 Sig: One every 8 hours as needed for muscle spasms Class: Normal  
 Pharmacy: [address] [facility] #: [phone] Introducing [facility] & HEALTH SERVICES! [facility] introduces [employer] patient portal. Now you can access parts of your medical record, email your doctor's office, and request medication refills online. 1. In your internet browser, go to [email or web address] 2. Click on the First Time User? Click Here link in the Sign In box. You will see the New Member Sign Up page. 3. Enter your [employer] Access Code exactly as it appears below. You will not need to use this code after youve completed the sign-up process. If you do not sign up before the expiration date, you must request a new code. · [employer] Access Code: [email or web address] Expires: 11/29/2017  1:17 PM 
 
4. Enter the last four digits of your Social Security Number (xxxx) and Date of Birth (mm/dd/yyyy) as indicated and click Submit. You will be taken to the next sign-up page. 5. Create a [employer] ID. This will be your [employer] login ID and cannot be changed, so think of one that is secure and easy to remember. 6. Create a [employer] password. You can change your password at any time. 7. Enter your Password Reset Question and Answer. This can be used at a later time if you forget your password. 8. Enter your e-mail address. You will receive e-mail notification when new information is available in [address] 9. Click Sign Up. You can now view and download portions of your medical record. 10. Click the Download Summary menu link to download a portable copy of your medical information. If you have questions, please visit the Frequently Asked Questions section of the [employer] website. Remember, [employer] is NOT to be used for urgent needs. For medical emergencies, dial 911. Now available from your iPhone and Android! Please provide this summary of care documentation to your next provider. Your primary care clinician is listed as Cyndy Mccray. If you have any questions after today's visit, please call [phone].

## 2017-10-24 RX ORDER — OLMESARTAN MEDOXOMIL AND HYDROCHLOROTHIAZIDE 20/12.5 20; 12.5 MG/1; MG/1
TABLET ORAL
Qty: 90 TAB | Refills: 3 | Status: SHIPPED | OUTPATIENT
Start: 2017-10-24 | End: 2019-01-07 | Stop reason: SDUPTHER

## 2018-03-02 ENCOUNTER — TELEPHONE (OUTPATIENT)
Dept: FAMILY MEDICINE CLINIC | Age: 53
End: 2018-03-02

## 2018-03-02 ENCOUNTER — OFFICE VISIT (OUTPATIENT)
Dept: FAMILY MEDICINE CLINIC | Age: 53
End: 2018-03-02

## 2018-03-02 VITALS
HEART RATE: 93 BPM | SYSTOLIC BLOOD PRESSURE: 111 MMHG | HEIGHT: 70 IN | WEIGHT: 259.8 LBS | TEMPERATURE: 96.5 F | RESPIRATION RATE: 14 BRPM | DIASTOLIC BLOOD PRESSURE: 70 MMHG | OXYGEN SATURATION: 94 % | BODY MASS INDEX: 37.19 KG/M2

## 2018-03-02 DIAGNOSIS — Z12.11 COLON CANCER SCREENING: ICD-10-CM

## 2018-03-02 DIAGNOSIS — F98.8 ATTENTION DEFICIT DISORDER (ADD) WITHOUT HYPERACTIVITY: ICD-10-CM

## 2018-03-02 DIAGNOSIS — I10 ESSENTIAL HYPERTENSION: ICD-10-CM

## 2018-03-02 DIAGNOSIS — Z12.11 ENCOUNTER FOR SCREENING FECAL OCCULT BLOOD TESTING: Primary | ICD-10-CM

## 2018-03-02 RX ORDER — DEXTROAMPHETAMINE SACCHARATE, AMPHETAMINE ASPARTATE MONOHYDRATE, DEXTROAMPHETAMINE SULFATE AND AMPHETAMINE SULFATE 5; 5; 5; 5 MG/1; MG/1; MG/1; MG/1
20 CAPSULE, EXTENDED RELEASE ORAL DAILY
Qty: 30 CAP | Refills: 0 | Status: SHIPPED | OUTPATIENT
Start: 2018-03-02 | End: 2018-03-26 | Stop reason: SDUPTHER

## 2018-03-02 RX ORDER — DEXTROAMPHETAMINE SACCHARATE, AMPHETAMINE ASPARTATE MONOHYDRATE, DEXTROAMPHETAMINE SULFATE AND AMPHETAMINE SULFATE 5; 5; 5; 5 MG/1; MG/1; MG/1; MG/1
20 CAPSULE, EXTENDED RELEASE ORAL DAILY
Qty: 30 CAP | Refills: 0 | Status: SHIPPED | OUTPATIENT
Start: 2018-04-01 | End: 2018-03-26 | Stop reason: SDUPTHER

## 2018-03-02 RX ORDER — DEXTROAMPHETAMINE SACCHARATE, AMPHETAMINE ASPARTATE MONOHYDRATE, DEXTROAMPHETAMINE SULFATE AND AMPHETAMINE SULFATE 5; 5; 5; 5 MG/1; MG/1; MG/1; MG/1
20 CAPSULE, EXTENDED RELEASE ORAL DAILY
Qty: 30 CAP | Refills: 0 | Status: SHIPPED | OUTPATIENT
Start: 2018-05-01 | End: 2018-03-26 | Stop reason: SDUPTHER

## 2018-03-02 RX ORDER — ATOMOXETINE 40 MG/1
40 CAPSULE ORAL DAILY
Qty: 30 CAP | Refills: 12 | Status: SHIPPED | OUTPATIENT
Start: 2018-03-02 | End: 2018-12-24 | Stop reason: SDUPTHER

## 2018-03-02 NOTE — PROGRESS NOTES
Both ears were flushed with warm warm water and Cerumen was removed from both ears. Pt tolerated the procedure well with no complaints.

## 2018-03-02 NOTE — MR AVS SNAPSHOT
[address] [facility], [place], operated by [facility] 
 
 
 [address] [facility] [place] [address] 
[phone] Patient: Mamadou Jorgensen. MRN: [id number] XEA:[id number] Visit Information Date & Time Provider Department Dept. Phone Encounter #  
 3/2/2018 10:30 AM Anna Albert MD [facility] [phone] [id number] Follow-up Instructions Return in about 3 months (around 6/2/2018). Your Appointments 5/3/2018  4:00 PM  
Any with Wing Princess MD  
[address] [place] [address] ([facility] Appt Note: Yearly cpap follow up  
 [address] [facility], [address] [address] [address] [place] [address], [address] [address]  
  
    
 6/5/2018  3:30 PM  
ROUTINE CARE with Anna Albert MD  
[facility]) Appt Note: routine follow up  
 [address] [facility] [place] [address]  
[phone] [address] [facility] [address] Upcoming Health Maintenance Date Due FOBT Q 1 YEAR AGE 50-75 9/19/2016 Pneumococcal 19-64 Highest Risk (2 of 3 - PCV13) 3/2/2019 DTaP/Tdap/Td series (2 - Td) 9/26/2022 Allergies as of 3/2/2018  Review Complete On: 3/2/2018 By: Anna Albert MD  
 No Known Allergies Current Immunizations  Reviewed on 11/24/2015 Name Date Influenza Vaccine 10/17/2013 TB Skin Test (PPD) Intradermal 11/24/2015 TDAP Vaccine 9/26/2012 Not reviewed this visit You Were Diagnosed With   
  
 Codes Comments Encounter for screening fecal occult blood testing    -  Primary ICD-10-CM: Z12.11 ICD-9-CM: V76.51 Essential hypertension     ICD-10-CM: I10 
ICD-9-CM: 401.9 Colon cancer screening     ICD-10-CM: Z12.11 ICD-9-CM: V76.51 Attention deficit disorder (ADD) without hyperactivity     ICD-10-CM: F98.8 ICD-9-CM: 314.00 Vitals BP Pulse Temp Resp Height(growth percentile) Weight(growth percentile) 111/70 93 96.5 °F (35.8 °C) (Oral) 14 5' 10\" (1.778 m) 259 lb 12.8 oz (117.8 kg) SpO2 BMI Smoking Status 94% 37.28 kg/m2 Never Smoker BMI and BSA Data Body Mass Index Body Surface Area  
 37.28 kg/m 2 2.41 m 2 Preferred Pharmacy Pharmacy Name Phone [id number] [phone] [facility] [phone] Your Updated Medication List  
  
   
This list is accurate as of 3/2/18 12:16 PM.  Always use your most recent med list.  
  
  
  
  
 * amphetamine-dextroamphetamine XR 20 mg XR capsule Commonly known as:  ADDERALL XR Take 2 Caps (40 mg total) by mouth every morningEarliest Fill Date: 8/31/17. Max Daily Amount: 40 mg  
  
 * amphetamine-dextroamphetamine XR 20 mg XR capsule Commonly known as:  ADDERALL XR Take 1 Cap (20 mg total) by mouth dailyEarliest Fill Date: 3/2/18. Max Daily Amount: 20 mg  
  
 * amphetamine-dextroamphetamine XR 20 mg XR capsule Commonly known as:  ADDERALL XR Take 1 Cap (20 mg total) by mouth dailyEarliest Fill Date: 4/1/18. Max Daily Amount: 20 mg  
Start taking on:  4/1/2018 * amphetamine-dextroamphetamine XR 20 mg XR capsule Commonly known as:  ADDERALL XR Take 1 Cap (20 mg total) by mouth dailyEarliest Fill Date: 5/1/18. Max Daily Amount: 20 mg  
Start taking on:  5/1/2018  
  
 atomoxetine 40 mg capsule Commonly known as:  STRATTERA Take 1 Cap by mouth daily. cyclobenzaprine 10 mg tablet Commonly known as:  FLEXERIL One every 8 hours as needed for muscle spasms  
  
 naproxen 500 mg tablet Commonly known as:  NAPROSYN Take 1 Tab by mouth two (2) times daily (with meals). olmesartan-hydroCHLOROthiazide 20-12.5 mg per tablet Commonly known as:  BENICAR HCT  
TAKE 1 TABLET BY MOUTH DAILY FOR BLOOD PRESSURE  
  
 promethazine-dextromethorphan 6.25-15 mg/5 mL syrup Commonly known as:  PROMETHAZINE-DM Take 5 mL by mouth four (4) times daily as needed for Cough. tadalafil 20 mg tablet Commonly known as:  CIALIS Take 1 Tab by mouth as needed. 1/2 - 1 tab one hour before sex on an empty stomach UNITHROID 200 mcg tablet Generic drug:  levothyroxine TAKE ONE TABLET BY MOUTH DAILY * Notice: This list has 4 medication(s) that are the same as other medications prescribed for you. Read the directions carefully, and ask your doctor or other care provider to review them with you. Prescriptions Printed Refills  
 amphetamine-dextroamphetamine XR (ADDERALL XR) 20 mg XR capsule 0 Sig: Take 1 Cap (20 mg total) by mouth dailyEarliest Fill Date: 3/2/18. Max Daily Amount: 20 mg  
 Class: Print Route: Oral  
 amphetamine-dextroamphetamine XR (ADDERALL XR) 20 mg XR capsule 0 Starting on: 4/1/2018 Sig: Take 1 Cap (20 mg total) by mouth dailyEarliest Fill Date: 4/1/18. Max Daily Amount: 20 mg  
 Class: Print Route: Oral  
 amphetamine-dextroamphetamine XR (ADDERALL XR) 20 mg XR capsule 0 Starting on: 5/1/2018 Sig: Take 1 Cap (20 mg total) by mouth dailyEarliest Fill Date: 5/1/18. Max Daily Amount: 20 mg  
 Class: Print Route: Oral  
  
Prescriptions Sent to Pharmacy Refills  
 atomoxetine (STRATTERA) 40 mg capsule 12 Sig: Take 1 Cap by mouth daily. Class: Normal  
 Pharmacy: [address] Ph #: [phone] Route: Oral  
  
We Performed the Following REFERRAL TO GASTROENTEROLOGY [BKH24 Custom] Follow-up Instructions Return in about 3 months (around 6/2/2018). Referral Information Referral ID Referred By Referred To  
  
 [id number] [facility] [place] [address] [place], [address] [facility] Visits Status Start Date End Date 1 New Request 3/2/18 3/2/19 If your referral has a status of pending review or denied, additional information will be sent to support the outcome of this decision. Introducing [facility] & HEALTH SERVICES! [facility] introduces [employer] patient portal. Now you can access parts of your medical record, email your doctor's office, and request medication refills online. 1. In your internet browser, go to [email or web address] 2. Click on the First Time User? Click Here link in the Sign In box. You will see the New Member Sign Up page. 3. Enter your [employer] Access Code exactly as it appears below. You will not need to use this code after youve completed the sign-up process. If you do not sign up before the expiration date, you must request a new code. · [employer] Access Code: [email or web address] Expires: 5/31/2018 11:10 AM 
 
4. Enter the last four digits of your Social Security Number (xxxx) and Date of Birth (mm/dd/yyyy) as indicated and click Submit. You will be taken to the next sign-up page. 5. Create a [employer] ID. This will be your [employer] login ID and cannot be changed, so think of one that is secure and easy to remember. 6. Create a [employer] password. You can change your password at any time. 7. Enter your Password Reset Question and Answer. This can be used at a later time if you forget your password. 8. Enter your e-mail address. You will receive e-mail notification when new information is available in [address] 9. Click Sign Up. You can now view and download portions of your medical record. 10. Click the Download Summary menu link to download a portable copy of your medical information. If you have questions, please visit the Frequently Asked Questions section of the [employer] website. Remember, [employer] is NOT to be used for urgent needs. For medical emergencies, dial 911. Now available from your iPhone and Android! Please provide this summary of care documentation to your next provider. Your primary care clinician is listed as Edgar Section. If you have any questions after today's visit, please call [phone].

## 2018-03-02 NOTE — PROGRESS NOTES
HISTORY OF PRESENT ILLNESS  Mamadou Jorgensen. is a 47 y.o. male. HPI Pt. Comes in for blood pressure check. Needs refill of adderall. Thinks may have some wax in ears. Gets blood work thru [facility]    Physical Exam   Constitutional: He appears well-developed and well-nourished. HENT:   Right Ear: External ear normal.   Left Ear: External ear normal.   Mouth/Throat: Oropharynx is clear and moist.   Neck: No thyromegaly present. Cardiovascular: Normal rate, regular rhythm, normal heart sounds and intact distal pulses. Pulmonary/Chest: Effort normal and breath sounds normal. No respiratory distress. He has no wheezes. Abdominal: Soft. Bowel sounds are normal. He exhibits no distension and no mass. There is no tenderness. There is no guarding. Musculoskeletal: Normal range of motion. He exhibits no edema. Lymphadenopathy:     He has no cervical adenopathy. Nursing note and vitals reviewed. ASSESSMENT and PLAN  Orders Placed This Encounter    [facility]    amphetamine-dextroamphetamine XR (ADDERALL XR) 20 mg XR capsule    amphetamine-dextroamphetamine XR (ADDERALL XR) 20 mg XR capsule    amphetamine-dextroamphetamine XR (ADDERALL XR) 20 mg XR capsule    atomoxetine (STRATTERA) 40 mg capsule     Diagnoses and all orders for this visit:    1. Encounter for screening fecal occult blood testing    2. Essential hypertension    3. Colon cancer screening  -     [facility]    4. Attention deficit disorder (ADD) without hyperactivity  -     amphetamine-dextroamphetamine XR (ADDERALL XR) 20 mg XR capsule; Take 1 Cap (20 mg total) by mouth dailyEarliest Fill Date: 3/2/18. Max Daily Amount: 20 mg  -     amphetamine-dextroamphetamine XR (ADDERALL XR) 20 mg XR capsule; Take 1 Cap (20 mg total) by mouth dailyEarliest Fill Date: 4/1/18. Max Daily Amount: 20 mg  -     amphetamine-dextroamphetamine XR (ADDERALL XR) 20 mg XR capsule;  Take 1 Cap (20 mg total) by mouth dailyEarliest Fill Date: 5/1/18. Max Daily Amount: 20 mg    Other orders  -     atomoxetine (STRATTERA) 40 mg capsule; Take 1 Cap by mouth daily. Follow-up Disposition:  Return in about 3 months (around 6/2/2018).

## 2018-03-02 NOTE — PROGRESS NOTES
Chief Complaint   Patient presents with    Attention Deficit Disorder    Ear Fullness     bilateral    Other     Possibility of pre-diabetes     1. Have you been to the ER, urgent care clinic since your last visit? Hospitalized since your last visit? No   Dr. Ruben Lenz   \"had a cold\"  Mid feb 2018    2. Have you seen or consulted any other health care providers outside of the [address] [place] [address] since your last visit? Include any pap smears or colon screening.  No     Health Maintenance Due   Topic Date Due    FOBT Q 1 YEAR AGE 50-75  09/19/2016   \

## 2018-03-02 NOTE — TELEPHONE ENCOUNTER
PA was put in for Strattera. Key is [id number]. Msg came back [facility] is reviewing your PA request and will respond within 24-72 hours. To check for an update later, open this request from your dashboard. You may close this dialog and return to your dashboard to perform other tasks.

## 2018-03-07 ENCOUNTER — TELEPHONE (OUTPATIENT)
Dept: FAMILY MEDICINE CLINIC | Age: 53
End: 2018-03-07

## 2018-03-07 NOTE — TELEPHONE ENCOUNTER
Received fax. [facility] said PA for Amoxetine (Strattera Generic) was NOT approved but appeal can be made.

## 2018-03-19 ENCOUNTER — DOCUMENTATION ONLY (OUTPATIENT)
Dept: FAMILY MEDICINE CLINIC | Age: 53
End: 2018-03-19

## 2018-03-21 ENCOUNTER — DOCUMENTATION ONLY (OUTPATIENT)
Dept: FAMILY MEDICINE CLINIC | Age: 53
End: 2018-03-21

## 2018-03-21 NOTE — PROGRESS NOTES
Tried to submit PA for strattera and seen PA was already done and denied. Found out by speaking with wife and pt that questions were answered wrong when PA was submitted a few week. arieler unaware and had to submit an appeal today. Pt upset and informed that once our office hears anything back we will contact him. Pt been taking for years and now its affecting him since kari had med in almost 2 weeks.  Appeal will take 72 hrs

## 2018-03-21 NOTE — PROGRESS NOTES
PA approved for Adderall 20mg. Approved until march 2019.  Pharmacy called to notify and pt's wife informed

## 2018-03-26 ENCOUNTER — DOCUMENTATION ONLY (OUTPATIENT)
Dept: FAMILY MEDICINE CLINIC | Age: 53
End: 2018-03-26

## 2018-03-26 DIAGNOSIS — F98.8 ATTENTION DEFICIT DISORDER (ADD) WITHOUT HYPERACTIVITY: ICD-10-CM

## 2018-03-26 RX ORDER — DEXTROAMPHETAMINE SACCHARATE, AMPHETAMINE ASPARTATE MONOHYDRATE, DEXTROAMPHETAMINE SULFATE AND AMPHETAMINE SULFATE 5; 5; 5; 5 MG/1; MG/1; MG/1; MG/1
20 CAPSULE, EXTENDED RELEASE ORAL DAILY
Qty: 60 CAP | Refills: 0 | Status: SHIPPED | OUTPATIENT
Start: 2018-05-01 | End: 2018-05-30

## 2018-03-26 RX ORDER — DEXTROAMPHETAMINE SACCHARATE, AMPHETAMINE ASPARTATE MONOHYDRATE, DEXTROAMPHETAMINE SULFATE AND AMPHETAMINE SULFATE 5; 5; 5; 5 MG/1; MG/1; MG/1; MG/1
CAPSULE, EXTENDED RELEASE ORAL
Qty: 60 CAP | Refills: 0 | Status: SHIPPED | OUTPATIENT
Start: 2018-03-26 | End: 2018-09-05 | Stop reason: SDUPTHER

## 2018-03-26 RX ORDER — DEXTROAMPHETAMINE SACCHARATE, AMPHETAMINE ASPARTATE MONOHYDRATE, DEXTROAMPHETAMINE SULFATE AND AMPHETAMINE SULFATE 5; 5; 5; 5 MG/1; MG/1; MG/1; MG/1
CAPSULE, EXTENDED RELEASE ORAL
Qty: 60 CAP | Refills: 0 | Status: SHIPPED | OUTPATIENT
Start: 2018-03-26 | End: 2018-06-05 | Stop reason: SDUPTHER

## 2018-03-26 NOTE — PROGRESS NOTES
Appeal approved for Strattera 40mg cap. Pharmacy called and pt's wife notified. Pharmacy getting med ready. Also adderal correction made , new scripts ready for . Wife aware. Pt to  today.

## 2018-06-05 ENCOUNTER — OFFICE VISIT (OUTPATIENT)
Dept: FAMILY MEDICINE CLINIC | Age: 53
End: 2018-06-05

## 2018-06-05 VITALS
OXYGEN SATURATION: 98 % | WEIGHT: 263.2 LBS | DIASTOLIC BLOOD PRESSURE: 82 MMHG | TEMPERATURE: 99.1 F | BODY MASS INDEX: 37.68 KG/M2 | SYSTOLIC BLOOD PRESSURE: 128 MMHG | HEIGHT: 70 IN | RESPIRATION RATE: 14 BRPM | HEART RATE: 95 BPM

## 2018-06-05 DIAGNOSIS — F98.8 ATTENTION DEFICIT DISORDER (ADD) WITHOUT HYPERACTIVITY: Primary | ICD-10-CM

## 2018-06-05 DIAGNOSIS — E78.00 HYPERCHOLESTEROLEMIA: ICD-10-CM

## 2018-06-05 DIAGNOSIS — Z12.5 PROSTATE CANCER SCREENING: ICD-10-CM

## 2018-06-05 DIAGNOSIS — I10 ESSENTIAL HYPERTENSION: ICD-10-CM

## 2018-06-05 PROBLEM — E66.01 SEVERE OBESITY (BMI 35.0-39.9): Status: ACTIVE | Noted: 2018-06-05

## 2018-06-05 LAB — HBA1C MFR BLD HPLC: 6.2 %

## 2018-06-05 RX ORDER — DEXTROAMPHETAMINE SACCHARATE, AMPHETAMINE ASPARTATE MONOHYDRATE, DEXTROAMPHETAMINE SULFATE AND AMPHETAMINE SULFATE 5; 5; 5; 5 MG/1; MG/1; MG/1; MG/1
40 CAPSULE, EXTENDED RELEASE ORAL DAILY
Qty: 60 CAP | Refills: 0 | Status: SHIPPED | OUTPATIENT
Start: 2018-07-05 | End: 2018-08-03

## 2018-06-05 RX ORDER — ERGOCALCIFEROL 1.25 MG/1
CAPSULE ORAL
Refills: 5 | COMMUNITY
Start: 2018-05-17 | End: 2022-07-27 | Stop reason: ALTCHOICE

## 2018-06-05 RX ORDER — DEXTROAMPHETAMINE SACCHARATE, AMPHETAMINE ASPARTATE MONOHYDRATE, DEXTROAMPHETAMINE SULFATE AND AMPHETAMINE SULFATE 5; 5; 5; 5 MG/1; MG/1; MG/1; MG/1
40 CAPSULE, EXTENDED RELEASE ORAL DAILY
Qty: 60 CAP | Refills: 0 | Status: SHIPPED | OUTPATIENT
Start: 2018-06-05 | End: 2018-07-05

## 2018-06-05 RX ORDER — DEXTROAMPHETAMINE SACCHARATE, AMPHETAMINE ASPARTATE MONOHYDRATE, DEXTROAMPHETAMINE SULFATE AND AMPHETAMINE SULFATE 5; 5; 5; 5 MG/1; MG/1; MG/1; MG/1
40 CAPSULE, EXTENDED RELEASE ORAL DAILY
Qty: 60 CAP | Refills: 0 | Status: SHIPPED | OUTPATIENT
Start: 2018-08-04 | End: 2018-09-02

## 2018-06-05 NOTE — PROGRESS NOTES
Chief Complaint   Patient presents with    Attention Deficit Disorder    Medication Refill     1. Have you been to the ER, urgent care clinic since your last visit? Hospitalized since your last visit? No    2. Have you seen or consulted any other health care providers outside of the [address] [place] [address] since your last visit? Include any pap smears or colon screening.  No     Health Maintenance Due   Topic Date Due    FOBT Q 1 YEAR AGE 50-75  09/19/2016

## 2018-06-05 NOTE — MR AVS SNAPSHOT
[facility] 
 
 
 [address] [facility] [address] [address] [phone] Patient: Mary Nobles. MRN: [id number] Visit Information Date & Time Provider Department Dept. Phone Encounter #  
 2018  3:30 PM Lizbet Borja MD [facility] [phone] [id number] Your Appointments 2018  4:00 PM  
Any with Marilynn Guillen MD  
[address] [facility] ([facility]) Appt Note: Former G patient- Yearly follow up; pt r/s  
 [address] [facility], [address] [address] [address] [place] [address], [address] [address] Upcoming Health Maintenance Date Due FOBT Q 1 YEAR AGE 50-75 2016 Influenza Age 5 to Adult 2018 Pneumococcal 19-64 Highest Risk (2 of 3 - PCV13) 3/2/2019 DTaP/Tdap/Td series (2 - Td) 2022 Allergies as of 2018  Review Complete On: 2018 By: Lizbet Borja MD  
 No Known Allergies Current Immunizations  Reviewed on 2015 Name Date Influenza Vaccine 10/17/2013 TB Skin Test (PPD) Intradermal 2015 TDAP Vaccine 2012 Not reviewed this visit You Were Diagnosed With   
  
 Codes Comments Attention deficit disorder (ADD) without hyperactivity    -  Primary ICD-10-CM: F98.8 ICD-9-CM: 314.00 Essential hypertension     ICD-10-CM: I10 
ICD-9-CM: 401.9 Hypercholesterolemia     ICD-10-CM: E78.00 ICD-9-CM: 272.0 Prostate cancer screening     ICD-10-CM: Z12.5 ICD-9-CM: V76.44 Vitals BP Pulse Temp Resp Height(growth percentile) Weight(growth percentile) 128/82 95 99.1 °F (37.3 °C) (Oral) 14 5' 10\" (1.778 m) 263 lb 3.2 oz (119.4 kg) SpO2 BMI Smoking Status 98% 37.77 kg/m2 Never Smoker BMI and BSA Data Body Mass Index Body Surface Area  
 37.77 kg/m 2 2.43 m 2 Preferred Pharmacy Pharmacy Name Phone [phone] [facility] [phone] Your Updated Medication List  
  
   
This list is accurate as of 6/5/18  4:24 PM.  Always use your most recent med list.  
  
  
  
  
 * amphetamine-dextroamphetamine XR 20 mg XR capsule Commonly known as:  ADDERALL XR Indications: Attention-Deficit Hyperactivity Disorder Earliest Fill Date: 3/26/18.  2 caps po daily * amphetamine-dextroamphetamine XR 20 mg XR capsule Commonly known as:  ADDERALL XR Take 2 Caps (40 mg total) by mouth dailyEarliest Fill Date: 6/5/18. Max Daily Amount: 40 mg  
  
 * amphetamine-dextroamphetamine XR 20 mg XR capsule Commonly known as:  ADDERALL XR Take 2 Caps (40 mg total) by mouth dailyEarliest Fill Date: 7/5/18. Max Daily Amount: 40 mg  
Start taking on:  7/5/2018 * amphetamine-dextroamphetamine XR 20 mg XR capsule Commonly known as:  ADDERALL XR Take 2 Caps (40 mg total) by mouth dailyEarliest Fill Date: 8/4/18. Max Daily Amount: 40 mg  
Start taking on:  8/4/2018  
  
 atomoxetine 40 mg capsule Commonly known as:  STRATTERA Take 1 Cap by mouth daily. cyclobenzaprine 10 mg tablet Commonly known as:  FLEXERIL One every 8 hours as needed for muscle spasms  
  
 ergocalciferol 50,000 unit capsule Commonly known as:  ERGOCALCIFEROL  
TAKE 1 CAPSULE BY MOUTH WEEKLY  
  
 naproxen 500 mg tablet Commonly known as:  NAPROSYN Take 1 Tab by mouth two (2) times daily (with meals). olmesartan-hydroCHLOROthiazide 20-12.5 mg per tablet Commonly known as:  BENICAR HCT  
TAKE 1 TABLET BY MOUTH DAILY FOR BLOOD PRESSURE  
  
 promethazine-dextromethorphan 6.25-15 mg/5 mL syrup Commonly known as:  PROMETHAZINE-DM Take 5 mL by mouth four (4) times daily as needed for Cough. tadalafil 20 mg tablet Commonly known as:  CIALIS  
 Take 1 Tab by mouth as needed. 1/2 - 1 tab one hour before sex on an empty stomach UNITHROID 200 mcg tablet Generic drug:  levothyroxine TAKE ONE TABLET BY MOUTH DAILY * Notice: This list has 4 medication(s) that are the same as other medications prescribed for you. Read the directions carefully, and ask your doctor or other care provider to review them with you. Prescriptions Printed Refills  
 amphetamine-dextroamphetamine XR (ADDERALL XR) 20 mg XR capsule 0 Sig: Take 2 Caps (40 mg total) by mouth dailyEarliest Fill Date: 6/5/18. Max Daily Amount: 40 mg  
 Class: Print Route: Oral  
 amphetamine-dextroamphetamine XR (ADDERALL XR) 20 mg XR capsule 0 Starting on: 7/5/2018 Sig: Take 2 Caps (40 mg total) by mouth dailyEarliest Fill Date: 7/5/18. Max Daily Amount: 40 mg  
 Class: Print Route: Oral  
 amphetamine-dextroamphetamine XR (ADDERALL XR) 20 mg XR capsule 0 Starting on: 8/4/2018 Sig: Take 2 Caps (40 mg total) by mouth dailyEarliest Fill Date: 8/4/18. Max Daily Amount: 40 mg  
 Class: Print Route: Oral  
  
We Performed the Following AMB POC HEMOGLOBIN A1C [86977 CPT(R)] CBC WITH AUTOMATED DIFF [10809 CPT(R)] LIPID PANEL [16582 CPT(R)] METABOLIC PANEL, COMPREHENSIVE [78902 CPT(R)] PSA, DIAGNOSTIC (PROSTATE SPECIFIC AG) [id number] CPT(R)] Introducing [facility]! Linda Campbell introduces VIPTALON patient portal. Now you can access parts of your medical record, email your doctor's office, and request medication refills online. 1. In your internet browser, go to [email or web address] 2. Click on the First Time User? Click Here link in the Sign In box. You will see the New Member Sign Up page. 3. Enter your VIPTALON Access Code exactly as it appears below. You will not need to use this code after youve completed the sign-up process.  If you do not sign up before the expiration date, you must request a new code. 
 
· Cellmemore Access Code: [email or web address] Expires: 9/3/2018  4:24 PM 
 
4. Enter the last four digits of your Social Security Number (xxxx) and Date of Birth (mm/dd/yyyy) as indicated and click Submit. You will be taken to the next sign-up page. 5. Create a Cellmemore ID. This will be your Cellmemore login ID and cannot be changed, so think of one that is secure and easy to remember. 6. Create a Cellmemore password. You can change your password at any time. 7. Enter your Password Reset Question and Answer. This can be used at a later time if you forget your password. 8. Enter your e-mail address. You will receive e-mail notification when new information is available in [address] 9. Click Sign Up. You can now view and download portions of your medical record. 10. Click the Download Summary menu link to download a portable copy of your medical information. If you have questions, please visit the Frequently Asked Questions section of the Cellmemore website. Remember, Cellmemore is NOT to be used for urgent needs. For medical emergencies, dial 911. Now available from your iPhone and Android! Please provide this summary of care documentation to your next provider. Your primary care clinician is listed as Bart Prince. If you have any questions after today's visit, please call [phone].

## 2018-06-05 NOTE — PROGRESS NOTES
HISTORY OF PRESENT ILLNESS  Kaur Asencio. is a 47 y.o. male. HPI In for recheck. Needs refill of adderall and strattera. Adderall helps him focus at work. Needs blood pressure and cholesterol checked. ROS    Physical Exam   Constitutional: He appears well-developed and well-nourished. HENT:   Right Ear: External ear normal.   Left Ear: External ear normal.   Mouth/Throat: Oropharynx is clear and moist.   Neck: No thyromegaly present. Cardiovascular: Normal rate, regular rhythm, normal heart sounds and intact distal pulses. Pulmonary/Chest: Effort normal and breath sounds normal. No respiratory distress. He has no wheezes. Abdominal: Soft. Bowel sounds are normal. He exhibits no distension and no mass. There is no tenderness. There is no guarding. Musculoskeletal: Normal range of motion. He exhibits no edema. Lymphadenopathy:     He has no cervical adenopathy. Nursing note and vitals reviewed. ASSESSMENT and PLAN  Orders Placed This Encounter    METABOLIC PANEL, COMPREHENSIVE    LIPID PANEL    CBC WITH AUTOMATED DIFF    PROSTATE SPECIFIC AG    AMB POC HEMOGLOBIN A1C    amphetamine-dextroamphetamine XR (ADDERALL XR) 20 mg XR capsule     Sig: Take 2 Caps (40 mg total) by mouth dailyEarliest Fill Date: 6/5/18. Max Daily Amount: 40 mg     Dispense:  60 Cap     Refill:  0    amphetamine-dextroamphetamine XR (ADDERALL XR) 20 mg XR capsule     Sig: Take 2 Caps (40 mg total) by mouth dailyEarliest Fill Date: 7/5/18. Max Daily Amount: 40 mg     Dispense:  60 Cap     Refill:  0    amphetamine-dextroamphetamine XR (ADDERALL XR) 20 mg XR capsule     Sig: Take 2 Caps (40 mg total) by mouth dailyEarliest Fill Date: 8/4/18.   Max Daily Amount: 40 mg     Dispense:  60 Cap     Refill:  0     Orders Placed This Encounter    METABOLIC PANEL, COMPREHENSIVE    LIPID PANEL    CBC WITH AUTOMATED DIFF    PROSTATE SPECIFIC AG    AMB POC HEMOGLOBIN A1C    amphetamine-dextroamphetamine XR (ADDERALL XR) 20 mg XR capsule    amphetamine-dextroamphetamine XR (ADDERALL XR) 20 mg XR capsule    amphetamine-dextroamphetamine XR (ADDERALL XR) 20 mg XR capsule     Diagnoses and all orders for this visit:    1. Attention deficit disorder (ADD) without hyperactivity  -     amphetamine-dextroamphetamine XR (ADDERALL XR) 20 mg XR capsule; Take 2 Caps (40 mg total) by mouth dailyEarliest Fill Date: 6/5/18. Max Daily Amount: 40 mg  -     amphetamine-dextroamphetamine XR (ADDERALL XR) 20 mg XR capsule; Take 2 Caps (40 mg total) by mouth dailyEarliest Fill Date: 7/5/18. Max Daily Amount: 40 mg  -     amphetamine-dextroamphetamine XR (ADDERALL XR) 20 mg XR capsule; Take 2 Caps (40 mg total) by mouth dailyEarliest Fill Date: 8/4/18. Max Daily Amount: 40 mg    2. Essential hypertension  -     METABOLIC PANEL, COMPREHENSIVE  -     AMB POC HEMOGLOBIN A1C  -     CBC WITH AUTOMATED DIFF    3.  Hypercholesterolemia  -     LIPID PANEL    4. Prostate cancer screening  -     PROSTATE SPECIFIC AG

## 2018-06-06 LAB
ALBUMIN SERPL-MCNC: 4.3 G/DL (ref 3.5–5.5)
ALBUMIN/GLOB SERPL: 1.5 {RATIO} (ref 1.2–2.2)
ALP SERPL-CCNC: 64 IU/L (ref 39–117)
ALT SERPL-CCNC: 21 IU/L (ref 0–44)
AST SERPL-CCNC: 21 IU/L (ref 0–40)
BASOPHILS # BLD AUTO: 0 X10E3/UL (ref 0–0.2)
BASOPHILS NFR BLD AUTO: 0 %
BILIRUB SERPL-MCNC: 0.3 MG/DL (ref 0–1.2)
BUN SERPL-MCNC: 15 MG/DL (ref 6–24)
BUN/CREAT SERPL: 15 (ref 9–20)
CALCIUM SERPL-MCNC: 9.1 MG/DL (ref 8.7–10.2)
CHLORIDE SERPL-SCNC: 105 MMOL/L (ref 96–106)
CHOLEST SERPL-MCNC: 205 MG/DL (ref 100–199)
CO2 SERPL-SCNC: 23 MMOL/L (ref 18–29)
CREAT SERPL-MCNC: 1.01 MG/DL (ref 0.76–1.27)
EOSINOPHIL # BLD AUTO: 0.2 X10E3/UL (ref 0–0.4)
EOSINOPHIL NFR BLD AUTO: 5 %
ERYTHROCYTE [DISTWIDTH] IN BLOOD BY AUTOMATED COUNT: 14.9 % (ref 12.3–15.4)
GFR SERPLBLD CREATININE-BSD FMLA CKD-EPI: 86 ML/MIN/1.73
GFR SERPLBLD CREATININE-BSD FMLA CKD-EPI: 99 ML/MIN/1.73
GLOBULIN SER CALC-MCNC: 2.9 G/DL (ref 1.5–4.5)
GLUCOSE SERPL-MCNC: 139 MG/DL (ref 65–99)
HCT VFR BLD AUTO: 40.2 % (ref 37.5–51)
HDLC SERPL-MCNC: 31 MG/DL
HGB BLD-MCNC: 13.4 G/DL (ref 13–17.7)
IMM GRANULOCYTES # BLD: 0 X10E3/UL (ref 0–0.1)
IMM GRANULOCYTES NFR BLD: 0 %
INTERPRETATION, 910389: NORMAL
LDLC SERPL CALC-MCNC: 100 MG/DL (ref 0–99)
LYMPHOCYTES # BLD AUTO: 2.1 X10E3/UL (ref 0.7–3.1)
LYMPHOCYTES NFR BLD AUTO: 41 %
MCH RBC QN AUTO: 29.4 PG (ref 26.6–33)
MCHC RBC AUTO-ENTMCNC: 33.3 G/DL (ref 31.5–35.7)
MCV RBC AUTO: 88 FL (ref 79–97)
MONOCYTES # BLD AUTO: 0.4 X10E3/UL (ref 0.1–0.9)
MONOCYTES NFR BLD AUTO: 7 %
NEUTROPHILS # BLD AUTO: 2.4 X10E3/UL (ref 1.4–7)
NEUTROPHILS NFR BLD AUTO: 47 %
PLATELET # BLD AUTO: 196 X10E3/UL (ref 150–379)
POTASSIUM SERPL-SCNC: 3.5 MMOL/L (ref 3.5–5.2)
PROT SERPL-MCNC: 7.2 G/DL (ref 6–8.5)
PSA SERPL-MCNC: 1.7 NG/ML (ref 0–4)
RBC # BLD AUTO: 4.56 X10E6/UL (ref 4.14–5.8)
SODIUM SERPL-SCNC: 141 MMOL/L (ref 134–144)
TRIGL SERPL-MCNC: 372 MG/DL (ref 0–149)
VLDLC SERPL CALC-MCNC: 74 MG/DL (ref 5–40)
WBC # BLD AUTO: 5.1 X10E3/UL (ref 3.4–10.8)

## 2018-09-05 ENCOUNTER — OFFICE VISIT (OUTPATIENT)
Dept: FAMILY MEDICINE CLINIC | Age: 53
End: 2018-09-05

## 2018-09-05 VITALS
SYSTOLIC BLOOD PRESSURE: 105 MMHG | BODY MASS INDEX: 36.36 KG/M2 | TEMPERATURE: 97.2 F | OXYGEN SATURATION: 94 % | WEIGHT: 254 LBS | HEART RATE: 102 BPM | HEIGHT: 70 IN | DIASTOLIC BLOOD PRESSURE: 60 MMHG

## 2018-09-05 DIAGNOSIS — F98.8 ATTENTION DEFICIT DISORDER (ADD) WITHOUT HYPERACTIVITY: ICD-10-CM

## 2018-09-05 RX ORDER — DEXTROAMPHETAMINE SACCHARATE, AMPHETAMINE ASPARTATE MONOHYDRATE, DEXTROAMPHETAMINE SULFATE AND AMPHETAMINE SULFATE 5; 5; 5; 5 MG/1; MG/1; MG/1; MG/1
CAPSULE, EXTENDED RELEASE ORAL
Qty: 60 CAP | Refills: 0 | Status: SHIPPED | OUTPATIENT
Start: 2018-09-05 | End: 2019-04-30 | Stop reason: SDUPTHER

## 2018-09-05 NOTE — PROGRESS NOTES
Chief Complaint   Patient presents with    Attention Deficit Disorder    Medication Refill    Thyroid Problem     1. Have you been to the ER, urgent care clinic since your last visit? Hospitalized since your last visit? No    2. Have you seen or consulted any other health care providers outside of the [address] [place] [address] since your last visit? Include any pap smears or colon screening.  No     Health Maintenance Due   Topic Date Due    FOBT Q 1 YEAR AGE 50-75  09/19/2016

## 2018-09-05 NOTE — MR AVS SNAPSHOT
Marlon Kehr 
 
 
 [address] [facility] 7 [id number] [phone] Patient: Tyshawn Hatch. MRN: [id number] Visit Information Date & Time Provider Department Dept. Phone Encounter #  
 9/5/2018  3:30 PM Stephanie Ball MD [address] [phone] [id number] Follow-up Instructions Return in about 3 months (around 12/5/2018). Your Appointments 9/6/2018  4:00 PM  
Any with Emely Muñoz MD  
[address] [facility] ([address]) Appt Note: Former G patient- Yearly follow up; pt r/s  
 [address] [facility] [address] [address] [address] [place] [address], [address] [address] Upcoming Health Maintenance Date Due FOBT Q 1 YEAR AGE 50-75 9/19/2016 Influenza Age 5 to Adult 3/29/2019* Pneumococcal 19-64 Highest Risk (2 of 3 - PCV13) 3/2/2019 DTaP/Tdap/Td series (2 - Td) 9/26/2022 *Topic was postponed. The date shown is not the original due date. Allergies as of 9/5/2018  Review Complete On: 9/5/2018 By: Stephanie Ball MD  
 No Known Allergies Current Immunizations  Reviewed on 11/24/2015 Name Date Influenza Vaccine 10/17/2013 TB Skin Test (PPD) Intradermal 11/24/2015 TDAP Vaccine 9/26/2012 Not reviewed this visit You Were Diagnosed With   
  
 Codes Comments Attention deficit disorder (ADD) without hyperactivity     ICD-10-CM: F98.8 ICD-9-CM: 314.00 Vitals BP Pulse Temp Height(growth percentile) Weight(growth percentile) SpO2  
 105/60 (!) 102 97.2 °F (36.2 °C) (Oral) 5' 10\" (1.778 m) 254 lb (115.2 kg) 94% BMI Smoking Status 36.45 kg/m2 Never Smoker BMI and BSA Data Body Mass Index Body Surface Area  
 36.45 kg/m 2 2.39 m 2 Preferred Pharmacy Pharmacy Name Phone [facility]ACY #[id number]- [id number] [phone] Your Updated Medication List  
  
   
This list is accurate as of 9/5/18  4:21 PM.  Always use your most recent med list.  
  
  
  
  
 amphetamine-dextroamphetamine XR 20 mg XR capsule Commonly known as:  ADDERALL XR Indications: Attention-Deficit Hyperactivity Disorder. 2 caps po daily  
  
 atomoxetine 40 mg capsule Commonly known as:  STRATTERA Take 1 Cap by mouth daily. ergocalciferol 50,000 unit capsule Commonly known as:  ERGOCALCIFEROL  
TAKE 1 CAPSULE BY MOUTH WEEKLY  
  
 olmesartan-hydroCHLOROthiazide 20-12.5 mg per tablet Commonly known as:  BENICAR HCT  
TAKE 1 TABLET BY MOUTH DAILY FOR BLOOD PRESSURE  
  
 tadalafil 20 mg tablet Commonly known as:  CIALIS Take 1 Tab by mouth as needed. 1/2 - 1 tab one hour before sex on an empty stomach UNITHROID 200 mcg tablet Generic drug:  levothyroxine TAKE ONE TABLET BY MOUTH DAILY Prescriptions Printed Refills  
 amphetamine-dextroamphetamine XR (ADDERALL XR) 20 mg XR capsule 0 Sig: Indications: Attention-Deficit Hyperactivity Disorder. 2 caps po daily Class: Print Follow-up Instructions Return in about 3 months (around 12/5/2018). Introducing [facility]! [facility] introduces Drop â€™til you Shop patient portal. Now you can access parts of your medical record, email your doctor's office, and request medication refills online. 1. In your internet browser, go to [email or web address] 2. Click on the First Time User? Click Here link in the Sign In box. You will see the New Member Sign Up page. 3. Enter your [employer] â[employer] Access Code exactly as it appears below. You will not need to use this code after youve completed the sign-up process. If you do not sign up before the expiration date, you must request a new code.  
 
· [employer] â[employer] Access Code: [email or web address] 
 Expires: 12/4/2018  4:21 PM 
 
4. Enter the last four digits of your Social Security Number (xxxx) and Date of Birth (mm/dd/yyyy) as indicated and click Submit. You will be taken to the next sign-up page. 5. Create a takokatt ID. This will be your [employer] login ID and cannot be changed, so think of one that is secure and easy to remember. 6. Create a [employer] password. You can change your password at any time. 7. Enter your Password Reset Question and Answer. This can be used at a later time if you forget your password. 8. Enter your e-mail address. You will receive e-mail notification when new information is available in [address] 9. Click Sign Up. You can now view and download portions of your medical record. 10. Click the Download Summary menu link to download a portable copy of your medical information. If you have questions, please visit the Frequently Asked Questions section of the [employer] website. Remember, [employer] is NOT to be used for urgent needs. For medical emergencies, dial 911. Now available from your iPhone and Android! Please provide this summary of care documentation to your next provider. Your primary care clinician is listed as Shahbaz Arthur. If you have any questions after today's visit, please call [phone].

## 2018-09-05 NOTE — PROGRESS NOTES
HISTORY OF PRESENT ILLNESS  Segun Zaomra is a 47 y.o. male. HPI in for recheck. Needs adderall refilled. Doing well, still working. Children [facility] in [place], one in [address] [place] [address], [place] in [place]. ROS    Physical Exam   Constitutional: He appears well-developed and well-nourished. HENT:   Right Ear: External ear normal.   Left Ear: External ear normal.   Mouth/Throat: Oropharynx is clear and moist.   Neck: No thyromegaly present. Cardiovascular: Normal rate, regular rhythm, normal heart sounds and intact distal pulses. Pulmonary/Chest: Effort normal and breath sounds normal. No respiratory distress. He has no wheezes. Abdominal: Soft. Bowel sounds are normal. He exhibits no distension and no mass. There is no tenderness. There is no guarding. Musculoskeletal: Normal range of motion. He exhibits no edema. Lymphadenopathy:     He has no cervical adenopathy. Nursing note and vitals reviewed. ASSESSMENT and PLAN  Orders Placed This Encounter    amphetamine-dextroamphetamine XR (ADDERALL XR) 20 mg XR capsule     Sig: Indications: Attention-Deficit Hyperactivity Disorder. 2 caps po daily     Dispense:  60 Cap     Refill:  0     Orders Placed This Encounter    amphetamine-dextroamphetamine XR (ADDERALL XR) 20 mg XR capsule     Diagnoses and all orders for this visit:    1. Attention deficit disorder (ADD) without hyperactivity  -     amphetamine-dextroamphetamine XR (ADDERALL XR) 20 mg XR capsule; Indications: Attention-Deficit Hyperactivity Disorder.   2 caps po daily

## 2018-09-06 ENCOUNTER — OFFICE VISIT (OUTPATIENT)
Dept: SLEEP MEDICINE | Age: 53
End: 2018-09-06

## 2018-09-06 VITALS
WEIGHT: 256 LBS | HEART RATE: 76 BPM | OXYGEN SATURATION: 98 % | BODY MASS INDEX: 36.65 KG/M2 | DIASTOLIC BLOOD PRESSURE: 57 MMHG | SYSTOLIC BLOOD PRESSURE: 97 MMHG | HEIGHT: 70 IN

## 2018-09-06 DIAGNOSIS — I10 ESSENTIAL HYPERTENSION: ICD-10-CM

## 2018-09-06 DIAGNOSIS — G47.33 OSA (OBSTRUCTIVE SLEEP APNEA): Primary | ICD-10-CM

## 2018-09-06 NOTE — PROGRESS NOTES
[address] [facility] [place]. [place], [address]  Tel.  [phone]  Fax. [address] [facility] [address]  [place], [address] [facility]  Tel.  [phone]  Fax. [phone] [address]  Tel.  [phone]  Fax. [phone]     S>Michele Morse is a 47 y.o. male seen for a positive airway pressure follow-up. He reports no problems using the device. The following problems are identified:    Drowsiness no Problems exhaling no   Snoring no Forget to put on no   Mask Comfortable yes Can't fall asleep no   Dry Mouth no Mask falls off no   Air Leaking no Frequent awakenings no     Download reviewed. He admits that his sleep has improved. Therapy Apnea Index averaged over PAP use: 2 /hr which reflects improved sleep breathing condition. No Known Allergies    He has a current medication list which includes the following prescription(s): amphetamine-dextroamphetamine xr, ergocalciferol, atomoxetine, olmesartan-hydrochlorothiazide, unithroid, and tadalafil. Caron Buchanan He  has a past medical history of Hypertension; Sleep apnea; and Thyroid disease. [place] Sleepiness Score: 11   and Modified F.O.S.Q. Score Total / 2: 16   which reflect improved sleep quality over therapy time. O>    Visit Vitals    BP 97/57    Pulse 76    Ht 5' 10\" (1.778 m)    Wt 256 lb (116.1 kg)    SpO2 98%    BMI 36.73 kg/m2           General:   Alert, oriented, not in distress   Neck:   No JVD    Chest/Lungs:  symetrical lung expansion , no accessory muscle use    Extremities:  no obvious rashes , negative edema    Neuro:  No focal deficits ; No obvious tremor    Psych:  Normal affect ,  Normal countenance ;         A>    ICD-10-CM ICD-9-CM    1. ASHLY (obstructive sleep apnea) G47.33 327.23 AMB SUPPLY ORDER   2. Essential hypertension I10 401.9      AHI = 39(2017). On CPAP :  5-15 cmH2O.     Compliant:      yes    Therapeutic Response:  Positive    P>      * Follow-up Disposition:  Return in about 1 year (around 9/6/2019). Change setting to 7-15cm  * He was asked to contact our office for any problems regarding PAP therapy. * Counseling was provided regarding the importance of regular PAP use and on proper sleep hygiene and safe driving. * Re-enforced proper and regular cleaning for the device. 2. Hypertension - he continues on his current regimen. I have reviewed the relationship between hypertension as it relates to sleep-disordered breathing.      Electronically signed by    Jan Ruffin MD  Diplomate in Sleep Medicine  [facility]

## 2018-09-06 NOTE — PROGRESS NOTES
Settings requested by Oscar Neumann per Dr. Sevilla Lyme  Set Mode to AutoSet   Set Min Pressure to 7.0 cmH2O   Set Max Pressure to 15.0 cmH2O   Set Response to Standard   Set EPR type to Fulltime   Set EPR level to 2   Set Ramp enable to Auto   Set Ramp time to 45 min   Set Start pressure to 4.0 cmH2O    Previous Settings   Set EPR level to 2   Set Mode to AutoSet   Set Min Pressure to 5.0 cmH2O   Set Max Pressure to 15.0 cmH2O   Set Response to Standard   Set EPR type to Fulltime   Set Ramp enable to Auto   Set Ramp time to 45 min   Set Start pressure to 4.0 cmH2O      Tech addressed patients questions on the setting and use of humidification

## 2018-09-06 NOTE — PATIENT INSTRUCTIONS
[address] [place]. [place], [address]  Tel.  [phone]  Fax. 100 [facility] 60  [place], [address]  Tel.  [phone]  Fax. [phone] [address] [facility] [place]ja [place]ico  Tel.  [phone]  Fax. [phone]     PROPER SLEEP HYGIENE    What to avoid  · Do not have drinks with caffeine, such as coffee or black tea, for 8 hours before bed. · Do not smoke or use other types of tobacco near bedtime. Nicotine is a stimulant and can keep you awake. · Avoid drinking alcohol late in the evening, because it can cause you to wake in the middle of the night. · Do not eat a big meal close to bedtime. If you are hungry, eat a light snack. · Do not drink a lot of water close to bedtime, because the need to urinate may wake you up during the night. · Do not read or watch TV in bed. Use the bed only for sleeping and sexual activity. What to try  · Go to bed at the same time every night, and wake up at the same time every morning. Do not take naps during the day. · Keep your bedroom quiet, dark, and cool. · Get regular exercise, but not within 3 to 4 hours of your bedtime. .  · Sleep on a comfortable pillow and mattress. · If watching the clock makes you anxious, turn it facing away from you so you cannot see the time. · If you worry when you lie down, start a worry book. Well before bedtime, write down your worries, and then set the book and your concerns aside. · Try meditation or other relaxation techniques before you go to bed. · If you cannot fall asleep, get up and go to another room until you feel sleepy. Do something relaxing. Repeat your bedtime routine before you go to bed again. · Make your house quiet and calm about an hour before bedtime. Turn down the lights, turn off the TV, log off the computer, and turn down the volume on music. This can help you relax after a busy day.     Drowsy Driving  The [address] [place] [address] Road Traffic Safety Administration cites drowsiness as a causing factor in more than 936,964 police reported crashes annually, resulting in 76,000 injuries and 1,500 deaths. Other surveys suggest 55% of people polled have driven while drowsy in the past year, 23% had fallen asleep but not crashed, 3% crashed, and 2% had and accident due to drowsy driving. Who is at risk? Young Drivers: One study of drowsy driving accidents states that 55% of the drivers were under 25 years. Of those, 75% were male. Shift Workers and Travelers: People who work overnight or travel across time zones frequently are at higher risk of experiencing Circadian Rhythm Disorders. They are trying to work and function when their body is programed to sleep. Sleep Deprived: Lack of sleep has a serious impact on your ability to pay attention or focus on a task. Consistently getting less than the average of 8 hours your body needs creates partial or cumulative sleep deprivation. Untreated Sleep Disorders: Sleep Apnea, Narcolepsy, R.L.S., and other sleep disorders (untreated) prevent a person from getting enough restful sleep. This leads to excessive daytime sleepiness and increases the risk for drowsy driving accidents by up to 7 times. Medications / Alcohol: Even over the counter medications can cause drowsiness. Medications that impair a drivers attention should have a warning label. Alcohol naturally makes you sleepy and on its own can cause accidents. Combined with excessive drowsiness its effects are amplified. Signs of Drowsy Driving:   * You don't remember driving the last few miles   * You may drift out of your nagi   * You are unable to focus and your thoughts wander   * You may yawn more often than normal   * You have difficulty keeping your eyes open / nodding off   * Missing traffic signs, speeding, or tailgating  Prevention-   Good sleep hygiene, lifestyle and behavioral choices have the most impact on drowsy driving.  There is no substitute for sleep and the average person requires 8 hours nightly. If you find yourself driving drowsy, stop and sleep. Consider the sleep hygiene tips provided during your visit as well. Medication Refill Policy: Refills for all medications require 1 week advance notice. Please have your pharmacy fax a refill request. We are unable to fax, or call in \"controled substance\" medications and you will need to pick these prescriptions up from our office. friendfund Activation    Thank you for requesting access to friendfund. Please follow the instructions below to securely access and download your online medical record. friendfund allows you to send messages to your doctor, view your test results, renew your prescriptions, schedule appointments, and more. How Do I Sign Up? 1. In your internet browser, go to [email or web address]. 2. Click on the First Time User? Click Here link in the Sign In box. You will see the New Member Sign Up page. 3. Enter your friendfund Access Code exactly as it appears below. You will not need to use this code after youve completed the sign-up process. If you do not sign up before the expiration date, you must request a new code. friendfund Access Code: [email or web address]  Expires: 2018  4:21 PM (This is the date your friendfund access code will )    4. Enter the last four digits of your Social Security Number (xxxx) and Date of Birth (mm/dd/yyyy) as indicated and click Submit. You will be taken to the next sign-up page. 5. Create a friendfund ID. This will be your friendfund login ID and cannot be changed, so think of one that is secure and easy to remember. 6. Create a friendfund password. You can change your password at any time. 7. Enter your Password Reset Question and Answer. This can be used at a later time if you forget your password. 8. Enter your e-mail address. You will receive e-mail notification when new information is available in [address] 9. Click Sign Up.  You can now view and download portions of your medical record. 10. Click the Download Summary menu link to download a portable copy of your medical information. Additional Information    If you have questions, please call [phone]. Remember, [employer] is NOT to be used for urgent needs. For medical emergencies, dial 911.

## 2018-09-07 ENCOUNTER — DOCUMENTATION ONLY (OUTPATIENT)
Dept: SLEEP MEDICINE | Age: 53
End: 2018-09-07

## 2018-12-24 RX ORDER — ATOMOXETINE 40 MG/1
40 CAPSULE ORAL DAILY
Qty: 90 CAP | Refills: 3 | Status: SHIPPED | OUTPATIENT
Start: 2018-12-24 | End: 2019-08-08 | Stop reason: SDUPTHER

## 2018-12-24 NOTE — TELEPHONE ENCOUNTER
Last Visit: 9/5  Next Appt: 12/27  Previous Refill Encounter: 3/2-30+12    Requested Prescriptions     Pending Prescriptions Disp Refills    atomoxetine (STRATTERA) 40 mg capsule 90 Cap 3     Sig: Take 1 Cap by mouth daily.

## 2018-12-27 ENCOUNTER — OFFICE VISIT (OUTPATIENT)
Dept: FAMILY MEDICINE CLINIC | Age: 53
End: 2018-12-27

## 2018-12-27 VITALS
TEMPERATURE: 99.1 F | RESPIRATION RATE: 14 BRPM | DIASTOLIC BLOOD PRESSURE: 69 MMHG | WEIGHT: 250 LBS | SYSTOLIC BLOOD PRESSURE: 110 MMHG | OXYGEN SATURATION: 98 % | HEIGHT: 70 IN | BODY MASS INDEX: 35.79 KG/M2 | HEART RATE: 115 BPM

## 2018-12-27 DIAGNOSIS — R73.03 PREDIABETES: ICD-10-CM

## 2018-12-27 DIAGNOSIS — M65.332 TRIGGER MIDDLE FINGER OF LEFT HAND: ICD-10-CM

## 2018-12-27 DIAGNOSIS — I10 ESSENTIAL HYPERTENSION: Primary | ICD-10-CM

## 2018-12-27 DIAGNOSIS — F90.2 ATTENTION DEFICIT HYPERACTIVITY DISORDER (ADHD), COMBINED TYPE: ICD-10-CM

## 2018-12-27 LAB — HBA1C MFR BLD HPLC: 6.3 %

## 2018-12-27 RX ORDER — METHYLPREDNISOLONE ACETATE 40 MG/ML
40 INJECTION, SUSPENSION INTRA-ARTICULAR; INTRALESIONAL; INTRAMUSCULAR; SOFT TISSUE ONCE
Qty: 1 ML | Refills: 0
Start: 2018-12-27 | End: 2018-12-27

## 2018-12-27 RX ORDER — DEXTROAMPHETAMINE SACCHARATE, AMPHETAMINE ASPARTATE MONOHYDRATE, DEXTROAMPHETAMINE SULFATE AND AMPHETAMINE SULFATE 5; 5; 5; 5 MG/1; MG/1; MG/1; MG/1
40 CAPSULE, EXTENDED RELEASE ORAL DAILY
Qty: 60 CAP | Refills: 0 | Status: SHIPPED | OUTPATIENT
Start: 2019-01-26 | End: 2019-02-24

## 2018-12-27 RX ORDER — DEXTROAMPHETAMINE SACCHARATE, AMPHETAMINE ASPARTATE MONOHYDRATE, DEXTROAMPHETAMINE SULFATE AND AMPHETAMINE SULFATE 5; 5; 5; 5 MG/1; MG/1; MG/1; MG/1
20 CAPSULE, EXTENDED RELEASE ORAL DAILY
Qty: 60 CAP | Refills: 0 | Status: SHIPPED | OUTPATIENT
Start: 2018-12-27 | End: 2019-01-26

## 2018-12-27 RX ORDER — DEXTROAMPHETAMINE SACCHARATE, AMPHETAMINE ASPARTATE MONOHYDRATE, DEXTROAMPHETAMINE SULFATE AND AMPHETAMINE SULFATE 5; 5; 5; 5 MG/1; MG/1; MG/1; MG/1
40 CAPSULE, EXTENDED RELEASE ORAL DAILY
Qty: 60 CAP | Refills: 0 | Status: SHIPPED | OUTPATIENT
Start: 2019-02-25 | End: 2019-03-26

## 2018-12-27 NOTE — PROGRESS NOTES
Chief Complaint   Patient presents with    Medication Refill    Attention Deficit Disorder     refill       1. Have you been to the ER, urgent care clinic since your last visit? Hospitalized since your last visit? No    2. Have you seen or consulted any other health care providers outside of the [address] [place] [address] since your last visit? Include any pap smears or colon screening.  No     Health Maintenance Due   Topic Date Due    Shingrix Vaccine Age 49> (1 of 2) 09/19/2016    FOBT Q 1 YEAR AGE 50-75  09/19/2016       [facility]  OFFICE PROCEDURE PROGRESS NOTE        Chart reviewed for the following:   Isha Monaco LPN, have reviewed the History, Physical and updated the Allergic reactions for [address] performed immediately prior to start of procedure:   Karuna TONG LPN, have performed the following reviews on Felicia Rush. prior to the start of the procedure:            * Patient was identified by name and date of birth   * Agreement on procedure being performed was verified  * Risks and Benefits explained to the patient  * Procedure site verified and marked as necessary  * Patient was positioned for comfort  * Consent was signed and verified     Time: 4:30PM      Date of procedure: 12/27/2018    Procedure performed by:  Micheal Fish MD    Provider assisted by:  Karuna Herndon LPN    Patient assisted by:  Karuna Herndon LPN    How tolerated by patient: pt tolerated injection well    Post Procedural Pain Scale:  Pt stated pain was at a \"3\"    Comments: cortisone injection in the left 3rd finger

## 2018-12-27 NOTE — PROGRESS NOTES
HISTORY OF PRESENT ILLNESS  Jr Mayes is a 47 y.o. male. HPI In for ADD check. Wants to have ears checked for wax. Thinks that may be getting trigger finger in L 3rd finger, has had surgery on R hand  for a trigger finger. Otherwise doing fairly well. ROS    Physical Exam   Constitutional: He appears well-developed and well-nourished. HENT:   Right Ear: External ear normal.   Left Ear: External ear normal.   Mouth/Throat: Oropharynx is clear and moist.   Ears- bilateral cerumen impactions. Neck: No thyromegaly present. Cardiovascular: Normal rate, regular rhythm, normal heart sounds and intact distal pulses. Pulmonary/Chest: Effort normal and breath sounds normal. No respiratory distress. He has no wheezes. Abdominal: Soft. Bowel sounds are normal. He exhibits no distension and no mass. There is no tenderness. There is no guarding. Musculoskeletal: Normal range of motion. He exhibits no edema. L 3rd finger- tender nodule distal 3rd mc   Lymphadenopathy:     He has no cervical adenopathy. Nursing note and vitals reviewed. ASSESSMENT and PLAN  Orders Placed This Encounter    METABOLIC PANEL, BASIC    AMB POC HEMOGLOBIN A1C    (DEPO-MEDROL 40 mg  -   quantity 1 for Reimbursement) METHYLPREDNISOLONE ACETATE 40 mg injection    20600 - DRAIN/INJECT SMALL JOINT/BURSA    amphetamine-dextroamphetamine XR (ADDERALL XR) 20 mg XR capsule    amphetamine-dextroamphetamine XR (ADDERALL XR) 20 mg XR capsule    amphetamine-dextroamphetamine XR (ADDERALL XR) 20 mg XR capsule    methylPREDNISolone acetate (DEPO-MEDROL) 40 mg/mL injection     Diagnoses and all orders for this visit:    1. Essential hypertension  -     METABOLIC PANEL, BASIC    2. Prediabetes  -     AMB POC HEMOGLOBIN A1C    3. Attention deficit hyperactivity disorder (ADHD), combined type  -     amphetamine-dextroamphetamine XR (ADDERALL XR) 20 mg XR capsule;  Take 1 Cap (20 mg total) by mouth dailyEarliest Fill Date: 12/27/18. Max Daily Amount: 49 mg  -     amphetamine-dextroamphetamine XR (ADDERALL XR) 20 mg XR capsule; Take 2 Caps (40 mg total) by mouth dailyEarliest Fill Date: 1/26/19. Max Daily Amount: 40 mg  -     amphetamine-dextroamphetamine XR (ADDERALL XR) 20 mg XR capsule; Take 2 Caps (40 mg total) by mouth dailyEarliest Fill Date: 2/25/19. Max Daily Amount: 40 mg    4. Trigger middle finger of left hand  -     METHYLPREDNISOLONE ACETATE INJECTION 40 MG  -     methylPREDNISolone acetate (DEPO-MEDROL) 40 mg/mL injection; 1 mL by IntraMUSCular route once for 1 dose. -     WI DRAIN/INJECT SMALL JOINT/BURSA      Follow-up Disposition:  Return in about 3 months (around 3/27/2019).

## 2018-12-28 LAB
BUN SERPL-MCNC: 17 MG/DL (ref 6–24)
BUN/CREAT SERPL: 15 (ref 9–20)
CALCIUM SERPL-MCNC: 10.7 MG/DL (ref 8.7–10.2)
CHLORIDE SERPL-SCNC: 98 MMOL/L (ref 96–106)
CO2 SERPL-SCNC: 26 MMOL/L (ref 20–29)
CREAT SERPL-MCNC: 1.15 MG/DL (ref 0.76–1.27)
GLUCOSE SERPL-MCNC: 104 MG/DL (ref 65–99)
POTASSIUM SERPL-SCNC: 4.5 MMOL/L (ref 3.5–5.2)
SODIUM SERPL-SCNC: 142 MMOL/L (ref 134–144)

## 2019-01-07 RX ORDER — OLMESARTAN MEDOXOMIL AND HYDROCHLOROTHIAZIDE 20/12.5 20; 12.5 MG/1; MG/1
TABLET ORAL
Qty: 90 TAB | Refills: 3 | Status: SHIPPED | OUTPATIENT
Start: 2019-01-07 | End: 2019-12-05 | Stop reason: SDUPTHER

## 2019-01-07 NOTE — TELEPHONE ENCOUNTER
Last Visit: 12/27  Next Appt: none  Previous Refill Encounter: 10/24-90+3    Requested Prescriptions     Pending Prescriptions Disp Refills    olmesartan-hydroCHLOROthiazide (BENICAR HCT) 20-12.5 mg per tablet 90 Tab 3     Sig: TAKE 1 TABLET BY MOUTH DAILY FOR BLOOD PRESSURE

## 2019-02-04 DIAGNOSIS — F98.8 ATTENTION DEFICIT DISORDER, UNSPECIFIED HYPERACTIVITY PRESENCE: Primary | ICD-10-CM

## 2019-02-04 RX ORDER — DEXTROAMPHETAMINE SACCHARATE, AMPHETAMINE ASPARTATE MONOHYDRATE, DEXTROAMPHETAMINE SULFATE AND AMPHETAMINE SULFATE 5; 5; 5; 5 MG/1; MG/1; MG/1; MG/1
20 CAPSULE, EXTENDED RELEASE ORAL
Qty: 42 CAP | Refills: 0 | Status: SHIPPED | OUTPATIENT
Start: 2019-02-04 | End: 2019-02-04 | Stop reason: ALTCHOICE

## 2019-04-17 ENCOUNTER — DOCUMENTATION ONLY (OUTPATIENT)
Dept: FAMILY MEDICINE CLINIC | Age: 54
End: 2019-04-17

## 2019-04-17 NOTE — PROGRESS NOTES
Received a call from tayler's wife stating pt took last pill , went to get his refill and was informed another PA was needed. Writer submitted PA to [employer] and was approved until 4/16/2020. Called wife back to let her know. Writer called christina at [phone].  They will get rx ready for pt

## 2019-04-30 ENCOUNTER — OFFICE VISIT (OUTPATIENT)
Dept: FAMILY MEDICINE CLINIC | Age: 54
End: 2019-04-30

## 2019-04-30 VITALS
HEART RATE: 107 BPM | WEIGHT: 253 LBS | OXYGEN SATURATION: 99 % | BODY MASS INDEX: 36.22 KG/M2 | HEIGHT: 70 IN | DIASTOLIC BLOOD PRESSURE: 67 MMHG | SYSTOLIC BLOOD PRESSURE: 108 MMHG | TEMPERATURE: 99.4 F | RESPIRATION RATE: 14 BRPM

## 2019-04-30 DIAGNOSIS — F98.8 ATTENTION DEFICIT DISORDER (ADD) WITHOUT HYPERACTIVITY: ICD-10-CM

## 2019-04-30 RX ORDER — DEXTROAMPHETAMINE SACCHARATE, AMPHETAMINE ASPARTATE MONOHYDRATE, DEXTROAMPHETAMINE SULFATE AND AMPHETAMINE SULFATE 5; 5; 5; 5 MG/1; MG/1; MG/1; MG/1
CAPSULE, EXTENDED RELEASE ORAL
Qty: 60 CAP | Refills: 0 | Status: SHIPPED | OUTPATIENT
Start: 2019-04-30 | End: 2019-08-08 | Stop reason: SDUPTHER

## 2019-04-30 NOTE — PROGRESS NOTES
Chief Complaint Patient presents with  Attention Deficit Disorder  Medication Refill 1. Have you been to the ER, urgent care clinic since your last visit? Hospitalized since your last visit? No 
 
2. Have you seen or consulted any other health care providers outside of the [address] [place] [address] since your last visit? Include any pap smears or colon screening. No  
 
Health Maintenance Due Topic Date Due  Shingrix Vaccine Age 50> (1 of 2) 09/19/2016  FOBT Q 1 YEAR AGE 50-75  09/19/2016

## 2019-04-30 NOTE — PROGRESS NOTES
HISTORY OF PRESENT ILLNESS Joanne Westfall is a 47 y.o. male. HPI In for ADD followup. Sons are in [place], one in [place] and one in [employer]. Both have been in for 4 years. Needs blood pressure checked. ROS Physical Exam  
Constitutional: He appears well-developed and well-nourished. HENT:  
Right Ear: External ear normal.  
Left Ear: External ear normal.  
Mouth/Throat: Oropharynx is clear and moist.  
Neck: No thyromegaly present. Cardiovascular: Normal rate, regular rhythm, normal heart sounds and intact distal pulses. Pulmonary/Chest: Effort normal and breath sounds normal. No respiratory distress. He has no wheezes. Abdominal: Soft. Bowel sounds are normal. He exhibits no distension and no mass. There is no tenderness. There is no guarding. Musculoskeletal: Normal range of motion. He exhibits no edema. Lymphadenopathy:  
  He has no cervical adenopathy. Nursing note and vitals reviewed. ASSESSMENT and PLAN Orders Placed This Encounter  amphetamine-dextroamphetamine XR (ADDERALL XR) 20 mg XR capsule Diagnoses and all orders for this visit: 1. Attention deficit disorder (ADD) without hyperactivity 
-     amphetamine-dextroamphetamine XR (ADDERALL XR) 20 mg XR capsule; 2 caps po daily  Indications: Attention Deficit Disorder with Hyperactivity Follow-up and Dispositions · Return in about 3 months (around 7/30/2019).

## 2019-08-08 ENCOUNTER — OFFICE VISIT (OUTPATIENT)
Dept: FAMILY MEDICINE CLINIC | Age: 54
End: 2019-08-08

## 2019-08-08 VITALS
BODY MASS INDEX: 36.19 KG/M2 | DIASTOLIC BLOOD PRESSURE: 61 MMHG | WEIGHT: 252.8 LBS | RESPIRATION RATE: 14 BRPM | SYSTOLIC BLOOD PRESSURE: 102 MMHG | HEIGHT: 70 IN | HEART RATE: 97 BPM | TEMPERATURE: 98.6 F | OXYGEN SATURATION: 96 %

## 2019-08-08 DIAGNOSIS — Z12.5 PROSTATE CANCER SCREENING: Primary | ICD-10-CM

## 2019-08-08 DIAGNOSIS — I10 ESSENTIAL HYPERTENSION: ICD-10-CM

## 2019-08-08 DIAGNOSIS — R73.03 PREDIABETES: ICD-10-CM

## 2019-08-08 DIAGNOSIS — F98.8 ATTENTION DEFICIT DISORDER (ADD) WITHOUT HYPERACTIVITY: ICD-10-CM

## 2019-08-08 LAB — HBA1C MFR BLD HPLC: 6.1 %

## 2019-08-08 RX ORDER — DEXTROAMPHETAMINE SACCHARATE, AMPHETAMINE ASPARTATE MONOHYDRATE, DEXTROAMPHETAMINE SULFATE AND AMPHETAMINE SULFATE 5; 5; 5; 5 MG/1; MG/1; MG/1; MG/1
40 CAPSULE, EXTENDED RELEASE ORAL DAILY
Qty: 60 CAP | Refills: 0 | Status: SHIPPED | OUTPATIENT
Start: 2019-10-07 | End: 2019-11-05

## 2019-08-08 RX ORDER — DEXTROAMPHETAMINE SACCHARATE, AMPHETAMINE ASPARTATE MONOHYDRATE, DEXTROAMPHETAMINE SULFATE AND AMPHETAMINE SULFATE 5; 5; 5; 5 MG/1; MG/1; MG/1; MG/1
40 CAPSULE, EXTENDED RELEASE ORAL DAILY
Qty: 60 CAP | Refills: 0 | Status: SHIPPED | OUTPATIENT
Start: 2019-09-07 | End: 2019-10-06

## 2019-08-08 RX ORDER — DEXTROAMPHETAMINE SACCHARATE, AMPHETAMINE ASPARTATE MONOHYDRATE, DEXTROAMPHETAMINE SULFATE AND AMPHETAMINE SULFATE 5; 5; 5; 5 MG/1; MG/1; MG/1; MG/1
40 CAPSULE, EXTENDED RELEASE ORAL DAILY
Qty: 60 CAP | Refills: 0 | Status: SHIPPED | OUTPATIENT
Start: 2019-08-08 | End: 2019-09-07

## 2019-08-08 RX ORDER — ATOMOXETINE 40 MG/1
40 CAPSULE ORAL DAILY
Qty: 90 CAP | Refills: 3 | Status: SHIPPED | OUTPATIENT
Start: 2019-08-08 | End: 2019-12-05 | Stop reason: SDUPTHER

## 2019-08-08 RX ORDER — DEXTROAMPHETAMINE SACCHARATE, AMPHETAMINE ASPARTATE MONOHYDRATE, DEXTROAMPHETAMINE SULFATE AND AMPHETAMINE SULFATE 5; 5; 5; 5 MG/1; MG/1; MG/1; MG/1
CAPSULE, EXTENDED RELEASE ORAL
Qty: 60 CAP | Refills: 0 | Status: SHIPPED | OUTPATIENT
Start: 2019-08-08 | End: 2019-12-05 | Stop reason: SDUPTHER

## 2019-08-08 NOTE — PROGRESS NOTES
Chief Complaint   Patient presents with    Medication Refill    Attention Deficit Disorder    Knee Pain     bilateral  \"left worse than right\"      1. Have you been to the ER, urgent care clinic since your last visit? Hospitalized since your last visit? No    2. Have you seen or consulted any other health care providers outside of the [address] [place] [address] since your last visit? Include any pap smears or colon screening.  No     Health Maintenance Due   Topic Date Due    Shingrix Vaccine Age 50> (1 of 2) 09/19/2016    FOBT Q 1 YEAR AGE 50-75  09/19/2016    Influenza Age 9 to Adult  08/01/2019

## 2019-08-08 NOTE — PROGRESS NOTES
HISTORY OF PRESENT ILLNESS  Belvie Carrel. is a 47 y.o. male. HPI In for recheck. Some pain in both knees, has had surgery on both knees. Needs blood pressure checked and refill on adderall. Some pain when gets up in am. Needs adderall refilled. ROS    Physical Exam   Constitutional: He appears well-developed and well-nourished. HENT:   Right Ear: External ear normal.   Left Ear: External ear normal.   Mouth/Throat: Oropharynx is clear and moist.   Neck: No thyromegaly present. Cardiovascular: Normal rate, regular rhythm, normal heart sounds and intact distal pulses. Pulmonary/Chest: Effort normal and breath sounds normal. No respiratory distress. He has no wheezes. Abdominal: Soft. Bowel sounds are normal. He exhibits no distension and no mass. There is no tenderness. There is no guarding. Musculoskeletal: Normal range of motion. He exhibits no edema. Knees- moderate crepitus bilaterally   Lymphadenopathy:     He has no cervical adenopathy. Nursing note and vitals reviewed. ASSESSMENT and PLAN  Orders Placed This Encounter    METABOLIC PANEL, COMPREHENSIVE    PROSTATE SPECIFIC AG    AMB POC COMPLETE CBC, AUTOMATED    AMB POC HEMOGLOBIN A1C    atomoxetine (STRATTERA) 40 mg capsule    amphetamine-dextroamphetamine XR (ADDERALL XR) 20 mg XR capsule    amphetamine-dextroamphetamine XR (ADDERALL XR) 20 mg XR capsule    amphetamine-dextroamphetamine XR (ADDERALL XR) 20 mg XR capsule    amphetamine-dextroamphetamine XR (ADDERALL XR) 20 mg XR capsule     Diagnoses and all orders for this visit:    1. Prostate cancer screening  -     PSA, DIAGNOSTIC (PROSTATE SPECIFIC AG)    2. Prediabetes  -     AMB POC HEMOGLOBIN A1C    3. Essential hypertension  -     METABOLIC PANEL, COMPREHENSIVE  -     AMB POC COMPLETE CBC, AUTOMATED    4.  Attention deficit disorder (ADD) without hyperactivity  -     amphetamine-dextroamphetamine XR (ADDERALL XR) 20 mg XR capsule; 2 caps po daily  Indications: Attention Deficit Disorder with Hyperactivity  -     amphetamine-dextroamphetamine XR (ADDERALL XR) 20 mg XR capsule; Take 2 Caps by mouth daily for 30 days. Max Daily Amount: 40 mg.  -     amphetamine-dextroamphetamine XR (ADDERALL XR) 20 mg XR capsule; Take 2 Caps by mouth daily for 29 days. Max Daily Amount: 40 mg.  -     amphetamine-dextroamphetamine XR (ADDERALL XR) 20 mg XR capsule; Take 2 Caps by mouth daily for 29 days. Max Daily Amount: 40 mg. Other orders  -     atomoxetine (STRATTERA) 40 mg capsule; Take 1 Cap by mouth daily. Follow-up and Dispositions    · Return in about 3 months (around 11/8/2019).

## 2019-08-09 LAB
ALBUMIN SERPL-MCNC: 4.3 G/DL (ref 3.5–5.5)
ALBUMIN/GLOB SERPL: 1.5 {RATIO} (ref 1.2–2.2)
ALP SERPL-CCNC: 61 IU/L (ref 39–117)
ALT SERPL-CCNC: 19 IU/L (ref 0–44)
AST SERPL-CCNC: 22 IU/L (ref 0–40)
BILIRUB SERPL-MCNC: 0.4 MG/DL (ref 0–1.2)
BUN SERPL-MCNC: 16 MG/DL (ref 6–24)
BUN/CREAT SERPL: 16 (ref 9–20)
CALCIUM SERPL-MCNC: 10.1 MG/DL (ref 8.7–10.2)
CHLORIDE SERPL-SCNC: 99 MMOL/L (ref 96–106)
CO2 SERPL-SCNC: 26 MMOL/L (ref 20–29)
CREAT SERPL-MCNC: 1.01 MG/DL (ref 0.76–1.27)
GLOBULIN SER CALC-MCNC: 2.8 G/DL (ref 1.5–4.5)
GLUCOSE SERPL-MCNC: 127 MG/DL (ref 65–99)
POTASSIUM SERPL-SCNC: 3.8 MMOL/L (ref 3.5–5.2)
PROT SERPL-MCNC: 7.1 G/DL (ref 6–8.5)
PSA SERPL-MCNC: 2.1 NG/ML (ref 0–4)
SODIUM SERPL-SCNC: 142 MMOL/L (ref 134–144)

## 2019-09-09 ENCOUNTER — OFFICE VISIT (OUTPATIENT)
Dept: SLEEP MEDICINE | Age: 54
End: 2019-09-09

## 2019-09-09 VITALS
OXYGEN SATURATION: 97 % | HEART RATE: 97 BPM | DIASTOLIC BLOOD PRESSURE: 73 MMHG | WEIGHT: 252 LBS | HEIGHT: 70 IN | BODY MASS INDEX: 36.08 KG/M2 | SYSTOLIC BLOOD PRESSURE: 120 MMHG

## 2019-09-09 DIAGNOSIS — G47.33 OSA (OBSTRUCTIVE SLEEP APNEA): Primary | ICD-10-CM

## 2019-09-09 DIAGNOSIS — I10 ESSENTIAL HYPERTENSION: ICD-10-CM

## 2019-09-09 NOTE — PROGRESS NOTES
[address] [place]. [place], [address]  Tel.  [phone]  Fax. [address] [facility] [address]  [place], [address]  Tel.  [phone]  Fax. [phone] [address] [facility] Marija Damico  Tel.  [phone]  Fax. [phone]     S>Michele Condon is a 47 y.o. male seen for a positive airway pressure follow-up. He reports no problems using the device. The following problems are identified:    Drowsiness no Problems exhaling no   Snoring no Forget to put on no   Mask Comfortable yes Can't fall asleep no   Dry Mouth no Mask falls off no   Air Leaking no Frequent awakenings no     Download reviewed. He admits that his sleep has improved. Therapy Apnea Index averaged over PAP use: 1 /hr which reflects improved sleep breathing condition. No Known Allergies    He has a current medication list which includes the following prescription(s): atomoxetine, amphetamine-dextroamphetamine xr, olmesartan-hydrochlorothiazide, ergocalciferol, unithroid, tadalafil, amphetamine-dextroamphetamine xr, and amphetamine-dextroamphetamine xr. .      He  has a past medical history of Hypertension, Sleep apnea, and Thyroid disease. [place] Sleepiness Score: 7   and Modified F.O.S.Q. Score Total / 2: 15   which reflect improved sleep quality over therapy time. O>    Visit Vitals  /73 (BP 1 Location: Left arm, BP Patient Position: Sitting)   Pulse 97   Ht 5' 10\" (1.778 m)   Wt 252 lb (114.3 kg)   SpO2 97%   BMI 36.16 kg/m²           General:   Alert, oriented, not in distress   Neck:   No JVD    Chest/Lungs:  symetrical lung expansion , no accessory muscle use    Extremities:  no obvious rashes , negative edema    Neuro:  No focal deficits ; No obvious tremor    Psych:  Normal affect ,  Normal countenance ;         A>    ICD-10-CM ICD-9-CM    1. ASHLY (obstructive sleep apnea) G47.33 327.23 AMB SUPPLY ORDER   2. Essential hypertension I10 401.9      AHI = 39(2017).   On CPAP, Resmed :  7-15 cmH2O. Compliant:      yes    Therapeutic Response:  Positive    P>      *   PAP therapy is effective and remains necessary for treatment of sleep apnea  I have ordered replacement supplies  he will continue on his current pressure settings. * He was asked to contact our office for any problems regarding PAP therapy. * Counseling was provided regarding the importance of regular PAP use and on proper sleep hygiene and safe driving. * Re-enforced proper and regular cleaning for the device. I have counseled the patient regarding the benefits of weight loss. 2. Hypertension - he continues on his current regimen. I have reviewed the relationship between hypertension as it relates to sleep-disordered breathing.      Electronically signed by    Qiana Beckwith MD  Diplomate in Sleep Medicine  [facility]

## 2019-09-09 NOTE — PATIENT INSTRUCTIONS
[address] [facility] [place]. [place], [address]  Tel.  [phone]  Fax. [address] [facility] [address]  [place], [address]  Tel.  [phone]  Fax. [phone] [address]  Tel.  [phone]  Fax. [phone]     PROPER SLEEP HYGIENE    What to avoid  · Do not have drinks with caffeine, such as coffee or black tea, for 8 hours before bed. · Do not smoke or use other types of tobacco near bedtime. Nicotine is a stimulant and can keep you awake. · Avoid drinking alcohol late in the evening, because it can cause you to wake in the middle of the night. · Do not eat a big meal close to bedtime. If you are hungry, eat a light snack. · Do not drink a lot of water close to bedtime, because the need to urinate may wake you up during the night. · Do not read or watch TV in bed. Use the bed only for sleeping and sexual activity. What to try  · Go to bed at the same time every night, and wake up at the same time every morning. Do not take naps during the day. · Keep your bedroom quiet, dark, and cool. · Get regular exercise, but not within 3 to 4 hours of your bedtime. .  · Sleep on a comfortable pillow and mattress. · If watching the clock makes you anxious, turn it facing away from you so you cannot see the time. · If you worry when you lie down, start a worry book. Well before bedtime, write down your worries, and then set the book and your concerns aside. · Try meditation or other relaxation techniques before you go to bed. · If you cannot fall asleep, get up and go to another room until you feel sleepy. Do something relaxing. Repeat your bedtime routine before you go to bed again. · Make your house quiet and calm about an hour before bedtime. Turn down the lights, turn off the TV, log off the computer, and turn down the volume on music. This can help you relax after a busy day.     Drowsy Driving  The [address] [place] [address] Road Traffic Safety Administration cites drowsiness as a causing factor in more than 666,800 police reported crashes annually, resulting in 76,000 injuries and 1,500 deaths. Other surveys suggest 55% of people polled have driven while drowsy in the past year, 23% had fallen asleep but not crashed, 3% crashed, and 2% had and accident due to drowsy driving. Who is at risk? Young Drivers: One study of drowsy driving accidents states that 55% of the drivers were under 25 years. Of those, 75% were male. Shift Workers and Travelers: People who work overnight or travel across time zones frequently are at higher risk of experiencing Circadian Rhythm Disorders. They are trying to work and function when their body is programed to sleep. Sleep Deprived: Lack of sleep has a serious impact on your ability to pay attention or focus on a task. Consistently getting less than the average of 8 hours your body needs creates partial or cumulative sleep deprivation. Untreated Sleep Disorders: Sleep Apnea, Narcolepsy, R.L.S., and other sleep disorders (untreated) prevent a person from getting enough restful sleep. This leads to excessive daytime sleepiness and increases the risk for drowsy driving accidents by up to 7 times. Medications / Alcohol: Even over the counter medications can cause drowsiness. Medications that impair a drivers attention should have a warning label. Alcohol naturally makes you sleepy and on its own can cause accidents. Combined with excessive drowsiness its effects are amplified. Signs of Drowsy Driving:   * You don't remember driving the last few miles   * You may drift out of your nagi   * You are unable to focus and your thoughts wander   * You may yawn more often than normal   * You have difficulty keeping your eyes open / nodding off   * Missing traffic signs, speeding, or tailgating  Prevention-   Good sleep hygiene, lifestyle and behavioral choices have the most impact on drowsy driving.  There is no substitute for sleep and the average person requires 8 hours nightly. If you find yourself driving drowsy, stop and sleep. Consider the sleep hygiene tips provided during your visit as well. Medication Refill Policy: Refills for all medications require 1 week advance notice. Please have your pharmacy fax a refill request. We are unable to fax, or call in \"controled substance\" medications and you will need to pick these prescriptions up from our office. Engiver Activation    Thank you for requesting access to [employer]. Please follow the instructions below to securely access and download your online medical record. Engiver allows you to send messages to your doctor, view your test results, renew your prescriptions, schedule appointments, and more. How Do I Sign Up? 1. In your internet browser, go to [email or web address]. 2. Click on the First Time User? Click Here link in the Sign In box. You will see the New Member Sign Up page. 3. Enter your Engiver Access Code exactly as it appears below. You will not need to use this code after youve completed the sign-up process. If you do not sign up before the expiration date, you must request a new code. Engiver Access Code: [email or web address]  Expires: 2019 11:45 AM (This is the date your Engiver access code will )    4. Enter the last four digits of your Social Security Number (xxxx) and Date of Birth (mm/dd/yyyy) as indicated and click Submit. You will be taken to the next sign-up page. 5. Create a Engiver ID. This will be your Engiver login ID and cannot be changed, so think of one that is secure and easy to remember. 6. Create a Engiver password. You can change your password at any time. 7. Enter your Password Reset Question and Answer. This can be used at a later time if you forget your password. 8. Enter your e-mail address. You will receive e-mail notification when new information is available in [address] 9. Click Sign Up.  You can now view and download portions of your medical record. 10. Click the Download Summary menu link to download a portable copy of your medical information. Additional Information    If you have questions, please call [phone]. Remember, [employer] is NOT to be used for urgent needs. For medical emergencies, dial 911.

## 2019-09-10 ENCOUNTER — DOCUMENTATION ONLY (OUTPATIENT)
Dept: SLEEP MEDICINE | Age: 54
End: 2019-09-10

## 2019-11-05 DIAGNOSIS — F98.8 ATTENTION DEFICIT DISORDER (ADD) WITHOUT HYPERACTIVITY: ICD-10-CM

## 2019-11-05 RX ORDER — DEXTROAMPHETAMINE SACCHARATE, AMPHETAMINE ASPARTATE MONOHYDRATE, DEXTROAMPHETAMINE SULFATE AND AMPHETAMINE SULFATE 5; 5; 5; 5 MG/1; MG/1; MG/1; MG/1
CAPSULE, EXTENDED RELEASE ORAL
Qty: 60 CAP | Refills: 0 | Status: CANCELLED | OUTPATIENT
Start: 2019-11-05

## 2019-12-05 ENCOUNTER — OFFICE VISIT (OUTPATIENT)
Dept: FAMILY MEDICINE CLINIC | Age: 54
End: 2019-12-05

## 2019-12-05 VITALS
TEMPERATURE: 96.3 F | HEIGHT: 70 IN | SYSTOLIC BLOOD PRESSURE: 119 MMHG | WEIGHT: 247 LBS | DIASTOLIC BLOOD PRESSURE: 77 MMHG | HEART RATE: 82 BPM | OXYGEN SATURATION: 97 % | BODY MASS INDEX: 35.36 KG/M2

## 2019-12-05 DIAGNOSIS — F98.8 ATTENTION DEFICIT DISORDER (ADD) WITHOUT HYPERACTIVITY: ICD-10-CM

## 2019-12-05 RX ORDER — OLMESARTAN MEDOXOMIL AND HYDROCHLOROTHIAZIDE 20/12.5 20; 12.5 MG/1; MG/1
TABLET ORAL
Qty: 90 TAB | Refills: 3 | Status: SHIPPED | OUTPATIENT
Start: 2019-12-05 | End: 2020-12-15 | Stop reason: SDUPTHER

## 2019-12-05 RX ORDER — ATOMOXETINE 40 MG/1
40 CAPSULE ORAL DAILY
Qty: 90 CAP | Refills: 3 | Status: SHIPPED | OUTPATIENT
Start: 2019-12-05 | End: 2020-03-05 | Stop reason: SDUPTHER

## 2019-12-05 RX ORDER — DEXTROAMPHETAMINE SACCHARATE, AMPHETAMINE ASPARTATE MONOHYDRATE, DEXTROAMPHETAMINE SULFATE AND AMPHETAMINE SULFATE 5; 5; 5; 5 MG/1; MG/1; MG/1; MG/1
CAPSULE, EXTENDED RELEASE ORAL
Qty: 60 CAP | Refills: 0 | Status: SHIPPED | OUTPATIENT
Start: 2019-12-05 | End: 2020-03-05 | Stop reason: SDUPTHER

## 2019-12-05 NOTE — PROGRESS NOTES
Verified patient with two types of identifiers. Verified pharmacy with patient. Verified medications with the patient. 1. Have you been to the ER, urgent care clinic since your last visit? Hospitalized since your last visit? No    2. Have you seen or consulted any other health care providers outside of the [address] [place] [address] since your last visit? Include any pap smears or colon screening.  No

## 2019-12-05 NOTE — PROGRESS NOTES
HISTORY OF PRESENT ILLNESS  Lesley Carrillo is a 49 y.o. male. HPIIn for ADD followup. Doing fairly well. Needs meds refilled. Still working. No palpitations, or chest pain. ROS    Physical Exam  Vitals signs and nursing note reviewed. Constitutional:       Appearance: He is well-developed. HENT:      Right Ear: External ear normal.      Left Ear: External ear normal.   Neck:      Thyroid: No thyromegaly. Cardiovascular:      Rate and Rhythm: Normal rate and regular rhythm. Heart sounds: Normal heart sounds. Pulmonary:      Effort: Pulmonary effort is normal. No respiratory distress. Breath sounds: Normal breath sounds. No wheezing. Abdominal:      General: Bowel sounds are normal. There is no distension. Palpations: Abdomen is soft. There is no mass. Tenderness: There is no tenderness. There is no guarding. Musculoskeletal: Normal range of motion. Lymphadenopathy:      Cervical: No cervical adenopathy. ASSESSMENT and PLAN  Orders Placed This Encounter    amphetamine-dextroamphetamine XR (ADDERALL XR) 20 mg XR capsule    atomoxetine (STRATTERA) 40 mg capsule    olmesartan-hydroCHLOROthiazide (BENICAR HCT) 20-12.5 mg per tablet     Diagnoses and all orders for this visit:    1. Attention deficit disorder (ADD) without hyperactivity  -     amphetamine-dextroamphetamine XR (ADDERALL XR) 20 mg XR capsule; 2 caps po daily  Indications: Attention Deficit Disorder with Hyperactivity    Other orders  -     atomoxetine (STRATTERA) 40 mg capsule; Take 1 Cap by mouth daily.   -     olmesartan-hydroCHLOROthiazide (BENICAR HCT) 20-12.5 mg per tablet; TAKE 1 TABLET BY MOUTH DAILY FOR BLOOD PRESSURE

## 2020-03-05 ENCOUNTER — OFFICE VISIT (OUTPATIENT)
Dept: FAMILY MEDICINE CLINIC | Age: 55
End: 2020-03-05

## 2020-03-05 VITALS
HEART RATE: 87 BPM | WEIGHT: 261.2 LBS | TEMPERATURE: 98.4 F | BODY MASS INDEX: 37.39 KG/M2 | RESPIRATION RATE: 18 BRPM | SYSTOLIC BLOOD PRESSURE: 119 MMHG | DIASTOLIC BLOOD PRESSURE: 79 MMHG | OXYGEN SATURATION: 97 % | HEIGHT: 70 IN

## 2020-03-05 DIAGNOSIS — F98.8 ATTENTION DEFICIT DISORDER (ADD) WITHOUT HYPERACTIVITY: ICD-10-CM

## 2020-03-05 DIAGNOSIS — I10 ESSENTIAL HYPERTENSION: Primary | ICD-10-CM

## 2020-03-05 DIAGNOSIS — E03.9 ACQUIRED HYPOTHYROIDISM: ICD-10-CM

## 2020-03-05 DIAGNOSIS — E78.00 HYPERCHOLESTEROLEMIA: ICD-10-CM

## 2020-03-05 DIAGNOSIS — Z23 ENCOUNTER FOR IMMUNIZATION: ICD-10-CM

## 2020-03-05 RX ORDER — DEXTROAMPHETAMINE SACCHARATE, AMPHETAMINE ASPARTATE MONOHYDRATE, DEXTROAMPHETAMINE SULFATE AND AMPHETAMINE SULFATE 5; 5; 5; 5 MG/1; MG/1; MG/1; MG/1
CAPSULE, EXTENDED RELEASE ORAL
Qty: 60 CAP | Refills: 0 | Status: SHIPPED | OUTPATIENT
Start: 2020-03-05 | End: 2020-07-13 | Stop reason: SDUPTHER

## 2020-03-05 RX ORDER — ATOMOXETINE 40 MG/1
40 CAPSULE ORAL DAILY
Qty: 90 CAP | Refills: 3 | Status: SHIPPED | OUTPATIENT
Start: 2020-03-05 | End: 2020-12-15 | Stop reason: SDUPTHER

## 2020-03-05 NOTE — PROGRESS NOTES
Chief Complaint   Patient presents with    Follow-up     3 Months/ADHD       1. Have you been to the ER, urgent care clinic since your last visit? Hospitalized since your last visit? No    2. Have you seen or consulted any other health care providers outside of the [address] [place] [address] since your last visit? Include any pap smears or colon screening.  No

## 2020-03-05 NOTE — PROGRESS NOTES
HISTORY OF PRESENT ILLNESS  Amelia Womack is a 49 y.o. male. HPI In for meds refill and bp check. Ears have been congested. Needs flu shot. Needs refills of adderall and strattera. No complaints of chest pain, shortness of breath, TIAs, claudication or edema. Past Medical History:   Diagnosis Date    Hypertension     Sleep apnea     Thyroid disease        Social History     Socioeconomic History    Marital status:      Spouse name: Not on file    Number of children: Not on file    Years of education: Not on file    Highest education level: Not on file   Tobacco Use    Smoking status: Never Smoker    Smokeless tobacco: Never Used   Substance and Sexual Activity    Alcohol use: No    Drug use: No    Sexual activity: Yes     Partners: Female   Social History Narrative    , 2 children, ages 25 and 15. Works for [employer] in [place]. Current Outpatient Medications   Medication Sig Dispense Refill    amphetamine-dextroamphetamine XR (ADDERALL XR) 20 mg XR capsule 2 caps po daily  Indications: attention deficit disorder with hyperactivity 60 Cap 0    atomoxetine (STRATTERA) 40 mg capsule Take 1 Cap by mouth daily. 90 Cap 3    olmesartan-hydroCHLOROthiazide (BENICAR HCT) 20-12.5 mg per tablet TAKE 1 TABLET BY MOUTH DAILY FOR BLOOD PRESSURE 90 Tab 3    ergocalciferol (ERGOCALCIFEROL) 50,000 unit capsule TAKE 1 CAPSULE BY MOUTH WEEKLY  5    UNITHROID 200 mcg tablet TAKE ONE TABLET BY MOUTH DAILY  0    tadalafil (CIALIS) 20 mg tablet Take 1 Tab by mouth as needed. 1/2 - 1 tab one hour before sex on an empty stomach 5 Tab 12         ROS    Physical Exam  Vitals signs and nursing note reviewed. Constitutional:       Appearance: He is well-developed. HENT:      Right Ear: External ear normal.      Left Ear: External ear normal.      Ears:      Comments: Bilateral cerumen impactions. Neck:      Thyroid: No thyromegaly.    Cardiovascular:      Rate and Rhythm: Normal rate and regular rhythm. Heart sounds: Normal heart sounds. Pulmonary:      Effort: Pulmonary effort is normal. No respiratory distress. Breath sounds: Normal breath sounds. No wheezing. Abdominal:      General: Bowel sounds are normal. There is no distension. Palpations: Abdomen is soft. There is no mass. Tenderness: There is no abdominal tenderness. There is no guarding. Musculoskeletal: Normal range of motion. Lymphadenopathy:      Cervical: No cervical adenopathy. ASSESSMENT and PLAN  Orders Placed This Encounter    Influenza virus vaccine (QUADRIVALENT PRES FREE SYRINGE) IM ([id number])    METABOLIC PANEL, COMPREHENSIVE    LIPID PANEL    CBC WITH AUTOMATED DIFF    TSH 3RD GENERATION    T4,FREE(DIRECT)    amphetamine-dextroamphetamine XR (ADDERALL XR) 20 mg XR capsule    atomoxetine (STRATTERA) 40 mg capsule     Diagnoses and all orders for this visit:    1. Essential hypertension  -     METABOLIC PANEL, COMPREHENSIVE  -     CBC WITH AUTOMATED DIFF    2. Attention deficit disorder (ADD) without hyperactivity  -     amphetamine-dextroamphetamine XR (ADDERALL XR) 20 mg XR capsule; 2 caps po daily  Indications: attention deficit disorder with hyperactivity    3. Hypercholesterolemia  -     LIPID PANEL    4. Acquired hypothyroidism  -     TSH 3RD GENERATION  -     T4,FREE(DIRECT)    5. Encounter for immunization  -     INFLUENZA VIRUS VAC QUAD,SPLIT,PRESV FREE SYRINGE IM    Other orders  -     atomoxetine (STRATTERA) 40 mg capsule; Take 1 Cap by mouth daily. Follow-up and Dispositions    · Return in about 6 months (around 9/5/2020).        Ears irrigated

## 2020-03-06 LAB
ALBUMIN SERPL-MCNC: 4.3 G/DL (ref 3.8–4.9)
ALBUMIN/GLOB SERPL: 1.3 {RATIO} (ref 1.2–2.2)
ALP SERPL-CCNC: 64 IU/L (ref 39–117)
ALT SERPL-CCNC: 26 IU/L (ref 0–44)
AST SERPL-CCNC: 25 IU/L (ref 0–40)
BASOPHILS # BLD AUTO: 0 X10E3/UL (ref 0–0.2)
BASOPHILS NFR BLD AUTO: 1 %
BILIRUB SERPL-MCNC: 0.3 MG/DL (ref 0–1.2)
BUN SERPL-MCNC: 19 MG/DL (ref 6–24)
BUN/CREAT SERPL: 18 (ref 9–20)
CALCIUM SERPL-MCNC: 9.7 MG/DL (ref 8.7–10.2)
CHLORIDE SERPL-SCNC: 97 MMOL/L (ref 96–106)
CHOLEST SERPL-MCNC: 236 MG/DL (ref 100–199)
CO2 SERPL-SCNC: 26 MMOL/L (ref 20–29)
CREAT SERPL-MCNC: 1.05 MG/DL (ref 0.76–1.27)
EOSINOPHIL # BLD AUTO: 0.2 X10E3/UL (ref 0–0.4)
EOSINOPHIL NFR BLD AUTO: 4 %
ERYTHROCYTE [DISTWIDTH] IN BLOOD BY AUTOMATED COUNT: 14.4 % (ref 11.6–15.4)
GLOBULIN SER CALC-MCNC: 3.2 G/DL (ref 1.5–4.5)
GLUCOSE SERPL-MCNC: 114 MG/DL (ref 65–99)
HCT VFR BLD AUTO: 43.4 % (ref 37.5–51)
HDLC SERPL-MCNC: 29 MG/DL
HGB BLD-MCNC: 15 G/DL (ref 13–17.7)
IMM GRANULOCYTES # BLD AUTO: 0 X10E3/UL (ref 0–0.1)
IMM GRANULOCYTES NFR BLD AUTO: 0 %
INTERPRETATION, 910389: NORMAL
LDLC SERPL CALC-MCNC: ABNORMAL MG/DL (ref 0–99)
LYMPHOCYTES # BLD AUTO: 2.5 X10E3/UL (ref 0.7–3.1)
LYMPHOCYTES NFR BLD AUTO: 55 %
MCH RBC QN AUTO: 30.1 PG (ref 26.6–33)
MCHC RBC AUTO-ENTMCNC: 34.6 G/DL (ref 31.5–35.7)
MCV RBC AUTO: 87 FL (ref 79–97)
MONOCYTES # BLD AUTO: 0.4 X10E3/UL (ref 0.1–0.9)
MONOCYTES NFR BLD AUTO: 9 %
NEUTROPHILS # BLD AUTO: 1.4 X10E3/UL (ref 1.4–7)
NEUTROPHILS NFR BLD AUTO: 31 %
PLATELET # BLD AUTO: 226 X10E3/UL (ref 150–450)
POTASSIUM SERPL-SCNC: 4.1 MMOL/L (ref 3.5–5.2)
PROT SERPL-MCNC: 7.5 G/DL (ref 6–8.5)
RBC # BLD AUTO: 4.99 X10E6/UL (ref 4.14–5.8)
SODIUM SERPL-SCNC: 139 MMOL/L (ref 134–144)
T4 FREE SERPL-MCNC: 1.12 NG/DL (ref 0.82–1.77)
TRIGL SERPL-MCNC: 645 MG/DL (ref 0–149)
TSH SERPL DL<=0.005 MIU/L-ACNC: 4.14 UIU/ML (ref 0.45–4.5)
VLDLC SERPL CALC-MCNC: ABNORMAL MG/DL (ref 5–40)
WBC # BLD AUTO: 4.5 X10E3/UL (ref 3.4–10.8)

## 2020-04-13 ENCOUNTER — DOCUMENTATION ONLY (OUTPATIENT)
Dept: FAMILY MEDICINE CLINIC | Age: 55
End: 2020-04-13

## 2020-07-13 DIAGNOSIS — F98.8 ATTENTION DEFICIT DISORDER (ADD) WITHOUT HYPERACTIVITY: ICD-10-CM

## 2020-07-13 RX ORDER — DEXTROAMPHETAMINE SACCHARATE, AMPHETAMINE ASPARTATE MONOHYDRATE, DEXTROAMPHETAMINE SULFATE AND AMPHETAMINE SULFATE 5; 5; 5; 5 MG/1; MG/1; MG/1; MG/1
CAPSULE, EXTENDED RELEASE ORAL
Qty: 60 CAP | Refills: 0 | Status: SHIPPED | OUTPATIENT
Start: 2020-07-13 | End: 2020-08-24 | Stop reason: SDUPTHER

## 2020-07-13 NOTE — TELEPHONE ENCOUNTER
----- Message from AlphonsomarkKony Omid sent at 7/13/2020  8:54 AM EDT -----  Regarding: Dr. Nelson Gonzalez (if not patient):      Relationship of caller (if not patient):      Best contact number(s):[phone]      Name of medication and dosage if known: Adderall      Is patient out of this medication (yes/no):yes      Pharmacy name:    Pharmacy listed in chart? (yes/no):yes  Pharmacy phone number:      Details to clarify the request:refill of Adderall      AlphonsomarkKony Omid

## 2020-08-24 DIAGNOSIS — F98.8 ATTENTION DEFICIT DISORDER (ADD) WITHOUT HYPERACTIVITY: ICD-10-CM

## 2020-08-24 RX ORDER — DEXTROAMPHETAMINE SACCHARATE, AMPHETAMINE ASPARTATE MONOHYDRATE, DEXTROAMPHETAMINE SULFATE AND AMPHETAMINE SULFATE 5; 5; 5; 5 MG/1; MG/1; MG/1; MG/1
CAPSULE, EXTENDED RELEASE ORAL
Qty: 60 CAP | Refills: 0 | Status: SHIPPED | OUTPATIENT
Start: 2020-08-24 | End: 2020-10-01 | Stop reason: SDUPTHER

## 2020-08-24 NOTE — TELEPHONE ENCOUNTER
Patient was called to return his message requesting a call regarding a medication. Patient states he needs a refill on his Adderall called into [facility]. Patient was identified by 2 verification methods. Please review  brefore prescribing new Rx.        Thanks,  [employer]    Last visit:3/05/20  Next visit:20  Previous refill 20(60+0R)    Requested Prescriptions     Pending Prescriptions Disp Refills    amphetamine-dextroamphetamine XR (ADDERALL XR) 20 mg XR capsule 60 Cap 0     Si caps po daily  Indications: attention deficit disorder with hyperactivity

## 2020-09-09 ENCOUNTER — VIRTUAL VISIT (OUTPATIENT)
Dept: FAMILY MEDICINE CLINIC | Age: 55
End: 2020-09-09
Payer: COMMERCIAL

## 2020-09-09 DIAGNOSIS — E03.9 ACQUIRED HYPOTHYROIDISM: ICD-10-CM

## 2020-09-09 DIAGNOSIS — I10 ESSENTIAL HYPERTENSION: ICD-10-CM

## 2020-09-09 DIAGNOSIS — E78.00 HYPERCHOLESTEROLEMIA: Primary | ICD-10-CM

## 2020-09-09 PROCEDURE — 85025 COMPLETE CBC W/AUTO DIFF WBC: CPT | Performed by: FAMILY MEDICINE

## 2020-09-09 PROCEDURE — 99213 OFFICE O/P EST LOW 20 MIN: CPT | Performed by: FAMILY MEDICINE

## 2020-09-09 NOTE — PROGRESS NOTES
HISTORY OF PRESENT ILLNESS  Mario Alberto Díaz is a 49 y.o. male. HPI Consent:  Pt. was seen by synchronous (real-time) audio- video technololgy, and/or her healthcare decision maker, is aware that this patient-initiated Telehealth encounter on   is a billable service, with coverage as determined by her insurance carrier. They are aware that they may receive a bill. Needs several meds refilled. Also needs lab drawn. Overall doing fairly well, has tried to stay close to home due to wifes poor health. ROS    Physical Exam    ASSESSMENT and PLAN  Orders Placed This Encounter    METABOLIC PANEL, COMPREHENSIVE    TSH 3RD GENERATION    T4,FREE(DIRECT)    LIPID PANEL    AMB POC COMPLETE CBC, AUTOMATED     Diagnoses and all orders for this visit:    1. Hypercholesterolemia  -     LIPID PANEL; Future    2. Essential hypertension  -     AMB POC COMPLETE CBC, AUTOMATED; Future  -     METABOLIC PANEL, COMPREHENSIVE; Future    3. Acquired hypothyroidism  -     TSH 3RD GENERATION; Future  -     T4,FREE(DIRECT); Future          To come by next week for lab, pain contract, drug screen and flu shot. Orders put in.

## 2020-09-09 NOTE — PROGRESS NOTES
Chief Complaint   Patient presents with    Medication Refill     1. Have you been to the ER, urgent care clinic since your last visit? Hospitalized since your last visit? No    2. Have you seen or consulted any other health care providers outside of the [address] [place] [address] since your last visit? Include any pap smears or colon screening. No     Patient here for med refill. Needs adderall refilled.

## 2020-09-17 ENCOUNTER — OFFICE VISIT (OUTPATIENT)
Dept: FAMILY MEDICINE CLINIC | Age: 55
End: 2020-09-17
Payer: COMMERCIAL

## 2020-09-17 DIAGNOSIS — F98.8 ATTENTION DEFICIT DISORDER (ADD) WITHOUT HYPERACTIVITY: Primary | ICD-10-CM

## 2020-09-17 DIAGNOSIS — Z23 NEEDS FLU SHOT: ICD-10-CM

## 2020-09-17 PROCEDURE — 99000 SPECIMEN HANDLING OFFICE-LAB: CPT | Performed by: FAMILY MEDICINE

## 2020-09-17 NOTE — LETTER
Name:Kandi Washington ZOD:[birth year] MR #:[id number] [address] [place] [address] Page 1 of 5 CONTROLLED SUBSTANCE AGREEMENT I may be prescribed medications that are controlled substances as part  of my treatment plan for management of my medical condition(s). The goal of my treatment plan is to maintain and/or improve my health and wellbeing. Because controlled substances have an increased risk of abuse or harm, continual re-evaluation is needed determine if the goals of my treatment plan are being met for my safety and the safety of others. Kaylan [place]  am entering into this Controlled Substance Agreement with my provider, __________________________________ at [facility] . I understand that successful treatment requires mutual trust and honesty between me and my provider. I understand that there are state and federal laws and regulations which apply to the medications that my provider may prescribe that must be followed. I understand there are risks and benefits ts of taking the medicines that my provider may prescribe. I understand and agree that following this Agreement is necessary in continuing my provider-patient relationship and success of my treatment plan. As a part of my treatment plan, I agree to the following: COMMUNICATION: 
 
1. I will communicate fully with my provider about my medical condition(s), including the effect on my daily life and how well my medications are helping. I will tell my provider all of the medications that I take for any reason, including medications I receive from another health care provider, and will notify my provider about all issues, problems or concerns, including any side effects, which may be related to my medications. I understand that this information allows my provider to adjust my treatment plan to help manage my medical condition.  I understand that this information will become part of my permanent medical record. 2. I will notify my provider if I have a history of alcohol/drug misuse/addiction or if I have had treatment for alcohol/drug addiction in the past, or if I have a new problem with or concern about alcohol/drug use/addiction, because this increases the likelihood of high risk behaviors and may lead to serious medical conditions. 3. Females Only: I will notify my provider if I am or become pregnant, or if I intend to become pregnant, or if I intend to breastfeed. I understand that communication of these issues with my provider is important, due to possible effects my medication could have on an unborn fetus or breastfeeding child. Initials_____ Name:.Michele Dunham ELT:[birth year] MR #:[id number] [address] [place] [address] Page 2 of 5 MISUSE OF MEDICATIONS / DRUGS: 
 
1. I agree to take all controlled substances as prescribed, and will not misuse or abuse any controlled substances prescribed by my provider. For my safety, I will not increase the amount of medicine I take without first talking with and getting permission from my provider. 2. If I have a medical emergency, another health care provider may prescribe me medication. If I seek emergency treatment, I will notify my provider within seventy-two (72) hours. 3. I understand that my provider may discuss my use and/or possible misuse/abuse of controlled substances and alcohol, as appropriate, with any health care provider involved in my care, pharmacist or legal authority. ILLEGAL DRUGS: 
 
1. I will not use illegal drugs of any kind, including but not limited to marijuana, heroin, cocaine, or any prescription drug which is not prescribed to me. DRUG DIVERSION / PRESCRIPTION FRAUD: 
 
1. I will not share, sell, trade, give away, or otherwise misuse my prescriptions or medications. 2. I will not alter any prescriptions provided to me by my provider. SINGLE PROVIDER: 
 
1. I agree that all controlled substances that I take will be prescribed only by my provider (or his/her covering provider) under this Agreement. This agreement does not prevent me from seeking emergency medical treatment or receiving pain management related to a surgery. PROTECTING MEDICATIONS: 
 
1. I am responsible for keeping my prescriptions and medications in a safe and secure place including safeguarding them from loss or theft. I understand that lost, stolen or damaged/destroyed prescriptions or medications will not be replaced. Initials____ Name:Kandi Arnett Jeanne NMO:[birth year] MR #:[id number] [address] [place] [address] Page 3 of 5 PRESCRIPTION RENEWALS/REFILLS: 
 
1. I will follow my controlled substance medication schedule as prescribed by my provider. 2. I understand and agree that I will make any requests for renewals or refills of my prescriptions only at the time of an office visit or during my providers regular office hours subject to the prescription refill requirements of the individual practice. 3. I understand that my provider may not call in prescriptions for controlled substances to my pharmacy. 4. I understand that my provider may adjust or discontinue these medications as deemed appropriate for my medical treatment plan. This Agreement does not guarantee the prescription of controlled medications. 5. I agree that if my medications are adjusted or discontinued, I will properly dispose of any remaining medications. I understand that I will be required to dispose of any remaining controlled medications prior to being provided with any prescriptions for other controlled medications. 6. I understand that the renewal of my prescription depends on my medical condition, my consistent participation, and my adherence with my treatment plan and this Agreement. 7. I understand that if I do not keep an appointment with my provider, I may not receive a renewal or refill for my controlled substance medication. PRESCRIPTION MONITORING / DRUG TESTIN. I understand that my provider may require me to provide urine, saliva or blood for testing at any time. I understand that this testing will be used to monitor for safety and adherence with my treatment plan and this Agreement. 2. I understand that my provider may ask me to provide an observed urine specimen, which means that a nurse or other health care provider may watch me provide urine, and I agree to cooperate if I am asked to provide an observed specimen. 3. I understand that if I do not provide urine, saliva or blood samples within two (2) hours of my providers request, or other timeframe decided by my provider, my treatment plan could be changed, or my prescriptions and medications may be changed or ended. 4. I understand that urine, saliva and blood test results will be a part of my permanent medical record. Initials_____ Name:.Michele Crespo RNV:[birth year] MR #:[id number] [address] [place] [address] Page 4 of 5 
 
5. I understand that my provider is required to obtain a copy of my State Prescription Monitoring Program () Report at any time in order to safely prescribe medications. 6. I will bring all of my prescribed controlled substance medications in their original bottles to all of my scheduled appointments. 7. I understand that my provider may ask me to come to the practice with all of my prescribed medications for a random pill count at any time. I agree to cooperate if I am asked to come in for a random pill count. I understand that if I do not arrive in the timeframe decided by my provider, my treatment plan could be changed, or my prescriptions and medications may be changed or ended.  
 
COOPERATION WITH INVESTIGATIONS: 
 
 1. I authorize my provider and my pharmacy to cooperate fully with any local, state, or federal law enforcement agency in the investigation of any possible misuse, sale, or other diversion of my controlled substance prescriptions or medications. RISKS: 
 
1. I understand that my level of consciousness may be affected from the use of controlled substances, and I understand that there are risks, benefits, effects and potential alternatives (including no treatment) to the medications that my provider has prescribed. 2. I understand that I may become drowsy, tired, dizzy, constipated, and sick to my stomach, or have changes in my mood or in my sleep while taking my medications. I have talked with my provider about these possible side effects, risks, benefits, and alternative treatments, and my provider has answered all of my questions. 3. I understand that I should not suddenly stop taking my medications without first speaking with my provider. I understand that if I suddenly stop taking my medications, I may experience nausea, vomiting, sweating,anxiety, sleeplessness, itching or other uncomfortable feelings. 4. I will not take my medications with alcohol of any kind, including beer, wine or liquor. I understand that drinking alcohol with my medications increases the chances of side effects, including breathing problems or even death. 5. I understand that if I have a history of alcoholism or other drug addiction I may be at increased risk of addiction to my medications. Signs of addiction might include craving, compulsive use, and continued use despite harm. Since addiction is a disease, I understand my provider may decide to change my medications and refer me to appropriate treatment services. I understand that this information would become part of my permanent medical record. Initials_____ Name:RadhaMichele Cabreraharry [place] XOCHITL:5/57/2396 MR #:[id number] [address] [place] [address]  
 Page 5 of 5  
 
 
6. Females only: Children born to women who regularly take controlled substances are likely to have physical problems and suffer withdrawal symptoms at birth. If I am of child-bearing age, I understand that I should use safe and effective birth control while taking any controlled substances to avoid the impact of medications on an unborn fetus or  child. I agree to notify my provider immediately if I should become pregnant so that my treatment plan can be adjusted. 7. Males only: I understand that chronic use of controlled substances has been associated with low testosterone levels in males which may affect my mood, stamina, sexual desire, and general health. I understand that my provider may order the appropriate laboratory test to determine my testosterone level,and I agree to this testing. ADHERENCE: 
 
1. I understand that if I do not adhere to this Agreement in any way, my provider may change my prescriptions, stop prescribing controlled substances or end our provider-patient relationship. 2. If my provider decides to stop prescribing medication, or decides to end our provider-patient relationship,my provider may require that I taper my medications slowly. If necessary, my provider may also provide a prescription for other medications to treat my withdrawal symptoms. UNDERSTANDING THIS AGREEMENT: 
 
I understand that my provider may adjust or stop my prescriptions for controlled substances based on my medical condition and my treatment plan. I understand that this Agreement does not guarantee that I will be prescribed medications or controlled substances. I understand that controlled substances may be just one part 
of my treatment plan. My initial on each page and my signature below shows that I have read each page of this Agreement, I have had an opportunity to ask questions, and all of my questions have been answered to my satisfaction by my provider. By signing below, I agree to comply with this Agreement, and I understand that if I do not follow the Agreements listed above, my provider may stop 
 
_________________________________________  Date/Time 9/17/2020 4:14 PM   
             (Patient Signature) 
 
________________________________________    Date/Time 9/17/2020 4:14 PM 
 (Parent or Guardian Signature if <18 yrs) 
 
_________________________________________ Date/Time 9/17/2020 4:14 PM 
 (Provider Signature)

## 2020-09-18 LAB
ALBUMIN SERPL-MCNC: 4.7 G/DL (ref 3.8–4.9)
ALBUMIN/GLOB SERPL: 1.8 {RATIO} (ref 1.2–2.2)
ALP SERPL-CCNC: 62 IU/L (ref 39–117)
ALT SERPL-CCNC: 29 IU/L (ref 0–44)
AST SERPL-CCNC: 28 IU/L (ref 0–40)
BILIRUB SERPL-MCNC: 0.3 MG/DL (ref 0–1.2)
BUN SERPL-MCNC: 16 MG/DL (ref 6–24)
BUN/CREAT SERPL: 16 (ref 9–20)
CALCIUM SERPL-MCNC: 9.7 MG/DL (ref 8.7–10.2)
CHLORIDE SERPL-SCNC: 96 MMOL/L (ref 96–106)
CHOLEST SERPL-MCNC: 213 MG/DL (ref 100–199)
CO2 SERPL-SCNC: 25 MMOL/L (ref 20–29)
CREAT SERPL-MCNC: 1.03 MG/DL (ref 0.76–1.27)
GLOBULIN SER CALC-MCNC: 2.6 G/DL (ref 1.5–4.5)
GLUCOSE SERPL-MCNC: 111 MG/DL (ref 65–99)
HDLC SERPL-MCNC: 31 MG/DL
INTERPRETATION, 910389: NORMAL
LDLC SERPL CALC-MCNC: 106 MG/DL (ref 0–99)
POTASSIUM SERPL-SCNC: 4.2 MMOL/L (ref 3.5–5.2)
PROT SERPL-MCNC: 7.3 G/DL (ref 6–8.5)
SODIUM SERPL-SCNC: 137 MMOL/L (ref 134–144)
T4 FREE SERPL-MCNC: 1.3 NG/DL (ref 0.82–1.77)
TRIGL SERPL-MCNC: 446 MG/DL (ref 0–149)
TSH SERPL DL<=0.005 MIU/L-ACNC: 1.77 UIU/ML (ref 0.45–4.5)
VLDLC SERPL CALC-MCNC: 76 MG/DL (ref 5–40)

## 2020-09-23 LAB
ALPRAZ UR QL: NEGATIVE
AMPHET UR CFM-MCNC: >3000 NG/ML
AMPHET UR QL CFM: POSITIVE
AMPHETAMINES UR QL SCN: NORMAL NG/ML
AMPHETAMINES UR QL: POSITIVE
BARBITURATES UR QL SCN: NEGATIVE NG/ML
BENZODIAZ UR QL: NEGATIVE NG/ML
BZE UR QL SCN: NEGATIVE NG/ML
CANNABINOIDS UR QL SCN: NEGATIVE NG/ML
CLONAZEPAM UR QL: NEGATIVE
CREAT UR-MCNC: 107.9 MG/DL (ref 20–300)
FLURAZEPAM UR QL: NEGATIVE
LORAZEPAM UR QL: NEGATIVE
METHADONE UR QL SCN: NEGATIVE NG/ML
METHAMPHET UR QL CFM: NEGATIVE
MIDAZOLAM UR QL CFM: NEGATIVE
NORDIAZEPAM UR QL: NEGATIVE
OPIATES UR QL SCN: NEGATIVE NG/ML
OXAZEPAM UR QL: NEGATIVE
OXYCODONE+OXYMORPHONE UR QL SCN: NEGATIVE NG/ML
PCP UR QL: NEGATIVE NG/ML
PH UR: 5.1 [PH] (ref 4.5–8.9)
PLEASE NOTE:, 733157: NORMAL
PROPOXYPH UR QL SCN: NEGATIVE NG/ML
SP GR UR: 1.02
TEMAZEPAM UR QL CFM: NEGATIVE
TRIAZOLAM UR QL: NEGATIVE

## 2020-10-01 DIAGNOSIS — F98.8 ATTENTION DEFICIT DISORDER (ADD) WITHOUT HYPERACTIVITY: ICD-10-CM

## 2020-10-01 RX ORDER — DEXTROAMPHETAMINE SACCHARATE, AMPHETAMINE ASPARTATE MONOHYDRATE, DEXTROAMPHETAMINE SULFATE AND AMPHETAMINE SULFATE 5; 5; 5; 5 MG/1; MG/1; MG/1; MG/1
CAPSULE, EXTENDED RELEASE ORAL
Qty: 60 CAP | Refills: 0 | Status: SHIPPED | OUTPATIENT
Start: 2020-10-01 | End: 2020-11-03 | Stop reason: SDUPTHER

## 2020-10-01 NOTE — TELEPHONE ENCOUNTER
Last visit:20  Next visit:10/05/20  Previous refill 20(60+0R)    Requested Prescriptions     Pending Prescriptions Disp Refills    amphetamine-dextroamphetamine XR (ADDERALL XR) 20 mg XR capsule 60 Cap 0     Si caps po daily  Indications: attention deficit disorder with hyperactivity       Please check  before prescribing new Rx    Thanks  Dwight Rainey

## 2020-10-05 ENCOUNTER — VIRTUAL VISIT (OUTPATIENT)
Dept: SLEEP MEDICINE | Age: 55
End: 2020-10-05
Payer: COMMERCIAL

## 2020-10-05 DIAGNOSIS — G47.33 OSA (OBSTRUCTIVE SLEEP APNEA): ICD-10-CM

## 2020-10-05 DIAGNOSIS — G47.33 OBSTRUCTIVE SLEEP APNEA (ADULT) (PEDIATRIC): Primary | ICD-10-CM

## 2020-10-05 DIAGNOSIS — I10 ESSENTIAL HYPERTENSION: ICD-10-CM

## 2020-10-05 PROCEDURE — 99213 OFFICE O/P EST LOW 20 MIN: CPT | Performed by: INTERNAL MEDICINE

## 2020-10-05 NOTE — PROGRESS NOTES
This note will not be viewable in [address] [facility] [place]. [place], [address]  Tel.  [phone]  Fax. 100 [facility] 60  [place], [address] [facility]  Tel.  [phone]  Fax. [phone] [address] [facility]  Tel.  [phone]  Fax. [phone]       Telemedicine visit performed with verbal consent of the patient. Patient called and identity confirmed with 2 patient identifers    Patient was seen at home  Naomy Hdz is a 48 y.o. male who was seen by synchronous (real-time) audio-video technology on 10/5/2020. Consent:  He and/or his healthcare decision maker is aware that this patient-initiated Telehealth encounter is the equivalent to a face to face encounter in the sleep disorder center and has provided verbal consent to proceed: Yes    I was at home while conducting this encounter. S>Michele LUCIAN Kim Woodard. is a 48 y.o. male seen at this telemedicine visit for a positive airway pressure follow-up. He reports no problems using the device. He is 97% compliant over the past 30 days. The following problems are identified:    Drowsiness no Problems exhaling no   Snoring no Forget to put on no   Mask Comfortable Most of the time but sometimes his cheek/jaw area hurt at strap site and he is wondering if he is wearing it too tightly  Can't fall asleep no   Dry Mouth no Mask falls off no   Air Leaking no Frequent awakenings no       Download reviewed. He admits that his sleep has improved on PAP therapy using full mask and heated tubing. No Known Allergies    He has a current medication list which includes the following prescription(s): amphetamine-dextroamphetamine xr, atomoxetine, olmesartan-hydrochlorothiazide, ergocalciferol, unithroid, and tadalafil. [place] He  has a past medical history of Hypertension, Sleep apnea, and Thyroid disease.     Spangler Sleepiness Score: 11      O>      There were no vitals taken for this visit. Vital Signs: (As obtained by patient/caregiver at home)        Constitutional: [x] Appears well-developed and well-nourished [x] No apparent distress      [] Abnormal -     Mental status: [x] Alert and awake  [x] Oriented to person/place/time [x] Able to follow commands    [] Abnormal -     Eyes:   EOM    [x]  Normal    [] Abnormal -   Sclera  [x]  Normal    [] Abnormal -          Discharge [x]  None visible   [] Abnormal -     HENT: [x] Normocephalic, atraumatic  [] Abnormal -     External Ears [x] Normal  [] Abnormal -    Neck: [x] No visualized mass [] Abnormal -     Pulmonary/Chest: [x] Respiratory effort normal   [x] No visualized signs of difficulty breathing or respiratory distress        [] Abnormal -       Neurological:        [x] No Facial Asymmetry (Cranial nerve 7 motor function) (limited exam due to video visit)          [x] No gaze palsy        [] Abnormal -          Skin:        [x] No significant exanthematous lesions or discoloration noted on facial skin         [] Abnormal -            Psychiatric:       [x] Normal Affect [] Abnormal -        Other pertinent observable physical exam findings:-            A>    ICD-10-CM ICD-9-CM    1. Obstructive sleep apnea (adult) (pediatric)  G47.33 327.23 AMB SUPPLY ORDER   2. ASHLY (obstructive sleep apnea)  G47.33 327.23    3. Essential hypertension  I10 401.9      AHI = 39 (2017). On CPAP :  7-15 cmH2O. Compliant:      yes    Therapeutic Response:  Positive    P>    he is compliant with PAP therapy and PAP continues to benefit patient and remains necessary for control of his sleep apnea. CPAP setting -he will continue on his current pressure settings. * We have recommended a dedicated weight loss through appropriate diet and an exercise regimen as significant weight reduction has been shown to reduce severity of obstructive sleep apnea.        * I have ordered replacement supplies  We discussed proper mask fit and CPAP pillows. Follow-up and Dispositions    · Return in about 1 year (around 10/5/2021). * He was asked to contact our office for any problems regarding PAP therapy. * Counseling was provided regarding the importance of regular PAP use and on proper sleep hygiene and safe driving. * Re-enforced proper and regular cleaning for the device. 2. Hypertension - he continues on his current regimen. I have reviewed the relationship between hypertension as it relates to sleep-disordered breathing. All of his questions were addressed. Pursuant to the emergency declaration under the [facility] waiver authority and the [employer] Act, this Virtual  Visit was conducted, with patient's consent, to reduce the patient's risk of exposure to COVID-19 and provide continuity of care for an established patient. Services were provided through a video synchronous discussion virtually to substitute for in-person clinic visit.     Robyn Mireles MD    Electronically signed by    Britton Levi MD  Diplomate in Sleep Medicine  [facility]

## 2020-10-05 NOTE — PATIENT INSTRUCTIONS
[address] [facility] [place]. [address], [address] Tel.  [phone] Fax. 100 [facility] 60 [place], [address] [facility] [address] Tel.  [phone] Fax. [phone] [address] Tel.  [phone] Fax. [phone] PROPER SLEEP HYGIENE What to avoid · Do not have drinks with caffeine, such as coffee or black tea, for 8 hours before bed. · Do not smoke or use other types of tobacco near bedtime. Nicotine is a stimulant and can keep you awake. · Avoid drinking alcohol late in the evening, because it can cause you to wake in the middle of the night. · Do not eat a big meal close to bedtime. If you are hungry, eat a light snack. · Do not drink a lot of water close to bedtime, because the need to urinate may wake you up during the night. · Do not read or watch TV in bed. Use the bed only for sleeping and sexual activity. What to try · Go to bed at the same time every night, and wake up at the same time every morning. Do not take naps during the day. · Keep your bedroom quiet, dark, and cool. · Get regular exercise, but not within 3 to 4 hours of your bedtime. Collette Ruts · Sleep on a comfortable pillow and mattress. · If watching the clock makes you anxious, turn it facing away from you so you cannot see the time. · If you worry when you lie down, start a worry book. Well before bedtime, write down your worries, and then set the book and your concerns aside. · Try meditation or other relaxation techniques before you go to bed. · If you cannot fall asleep, get up and go to another room until you feel sleepy. Do something relaxing. Repeat your bedtime routine before you go to bed again. · Make your house quiet and calm about an hour before bedtime. Turn down the lights, turn off the TV, log off the computer, and turn down the volume on music. This can help you relax after a busy day. Drowsy Driving [facility] 54 cites drowsiness as a causing factor in more than 491,525 police reported crashes annually, resulting in 76,000 injuries and 1,500 deaths. Other surveys suggest 55% of people polled have driven while drowsy in the past year, 23% had fallen asleep but not crashed, 3% crashed, and 2% had and accident due to drowsy driving. Who is at risk? Young Drivers: One study of drowsy driving accidents states that 55% of the drivers were under 25 years. Of those, 75% were male. Shift Workers and Travelers: People who work overnight or travel across time zones frequently are at higher risk of experiencing Circadian Rhythm Disorders. They are trying to work and function when their body is programed to sleep. Sleep Deprived: Lack of sleep has a serious impact on your ability to pay attention or focus on a task. Consistently getting less than the average of 8 hours your body needs creates partial or cumulative sleep deprivation. Untreated Sleep Disorders: Sleep Apnea, Narcolepsy, R.L.S., and other sleep disorders (untreated) prevent a person from getting enough restful sleep. This leads to excessive daytime sleepiness and increases the risk for drowsy driving accidents by up to 7 times. Medications / Alcohol: Even over the counter medications can cause drowsiness. Medications that impair a drivers attention should have a warning label. Alcohol naturally makes you sleepy and on its own can cause accidents. Combined with excessive drowsiness its effects are amplified. Signs of Drowsy Driving: * You don't remember driving the last few miles * You may drift out of your nagi * You are unable to focus and your thoughts wander * You may yawn more often than normal 
 * You have difficulty keeping your eyes open / nodding off * Missing traffic signs, speeding, or tailgating Prevention-  
Good sleep hygiene, lifestyle and behavioral choices have the most impact on drowsy driving. There is no substitute for sleep and the average person requires 8 hours nightly. If you find yourself driving drowsy, stop and sleep. Consider the sleep hygiene tips provided during your visit as well. Medication Refill Policy: Refills for all medications require 1 week advance notice. Please have your pharmacy fax a refill request. We are unable to fax, or call in \"controled substance\" medications and you will need to pick these prescriptions up from our office. [employer] Activation Thank you for requesting access to [employer]. Please follow the instructions below to securely access and download your online medical record. [employer] allows you to send messages to your doctor, view your test results, renew your prescriptions, schedule appointments, and more. How Do I Sign Up? 1. In your internet browser, go to [email or web address]. 2. Click on the First Time User? Click Here link in the Sign In box. You will see the New Member Sign Up page. 3. Enter your [employer] Access Code exactly as it appears below. You will not need to use this code after youve completed the sign-up process. If you do not sign up before the expiration date, you must request a new code. [employer] Access Code: Activation code not generated Current [employer] Status: Active (This is the date your [employer] access code will ) 4. Enter the last four digits of your Social Security Number (xxxx) and Date of Birth (mm/dd/yyyy) as indicated and click Submit. You will be taken to the next sign-up page. 5. Create a [employer] ID. This will be your [employer] login ID and cannot be changed, so think of one that is secure and easy to remember. 6. Create a [employer] password. You can change your password at any time. 7. Enter your Password Reset Question and Answer. This can be used at a later time if you forget your password. 8. Enter your e-mail address.  You will receive e-mail notification when new information is available in [email or web address]. 9. Click Sign Up. You can now view and download portions of your medical record. 10. Click the Download Summary menu link to download a portable copy of your medical information. Additional Information If you have questions, please call [phone]. Remember, [email or web address] is NOT to be used for urgent needs. For medical emergencies, dial 911.

## 2020-10-07 ENCOUNTER — DOCUMENTATION ONLY (OUTPATIENT)
Dept: SLEEP MEDICINE | Age: 55
End: 2020-10-07

## 2020-11-03 DIAGNOSIS — F98.8 ATTENTION DEFICIT DISORDER (ADD) WITHOUT HYPERACTIVITY: ICD-10-CM

## 2020-11-03 RX ORDER — DEXTROAMPHETAMINE SACCHARATE, AMPHETAMINE ASPARTATE MONOHYDRATE, DEXTROAMPHETAMINE SULFATE AND AMPHETAMINE SULFATE 5; 5; 5; 5 MG/1; MG/1; MG/1; MG/1
CAPSULE, EXTENDED RELEASE ORAL
Qty: 60 CAP | Refills: 0 | Status: SHIPPED | OUTPATIENT
Start: 2020-11-03 | End: 2020-12-15 | Stop reason: SDUPTHER

## 2020-11-16 ENCOUNTER — TELEPHONE (OUTPATIENT)
Dept: FAMILY MEDICINE CLINIC | Age: 55
End: 2020-11-16

## 2020-11-16 NOTE — TELEPHONE ENCOUNTER
Spoke with wife regarding patient. Patient wife very concerned that patient maybe having a thyroid stortm as patient has had extreme lethargy that wife had extreme trouble getting patient to respond. Vitals were fine. Patient at testing now for Covid for upcoming procedure. Wife wondering if any bloodwork can be done to rule out thyroid problem. Wife notified of last thyroid results on 9/9/20 and labs were normal.  Once patient gets covid rapid test results to notify pcp in case patient needs lab work or provider recommendations.

## 2020-11-16 NOTE — TELEPHONE ENCOUNTER
----- Message from James Kim sent at 11/16/2020  1:45 PM EST -----  Regarding: Dr. Yossi Cornelius  Patient return call     Caller's first and last name and relationship (if not the patient): Sabi Walters/wife       Best contact number(s): [phone]      Whose call is being returned: Js Frank       Details to clarify the request: Pts wife would like to speak with Js Frank as soon as possible. Phone number has been verified.        James Kim

## 2020-11-16 NOTE — TELEPHONE ENCOUNTER
----- Message from [employer] sent at 11/16/2020 12:30 PM EST -----  Regarding: Dr. Inessa Rizo  Patient return call    Caller's first and last name and relationship (if not the patient):  Sabi Walters/wife      Best contact number(s): [phone]      Whose call is being returned: Mandie Armstrong      Details to clarify the request: n/a      [employer]

## 2020-11-16 NOTE — TELEPHONE ENCOUNTER
----- Message from Blanka Person sent at 11/16/2020 12:30 PM EST -----  Regarding: Dr. Ramon Casillas  Patient return call    Caller's first and last name and relationship (if not the patient):  Sabi Walters/wife      Best contact number(s): [phone]      Whose call is being returned: Cassidy Mann      Details to clarify the request: n/a      Blanka Person

## 2020-11-16 NOTE — TELEPHONE ENCOUNTER
----- Message from Evan Torres sent at 11/16/2020 10:01 AM EST -----  Regarding: Dr. Edinson Hassan telephone  General Message/Vendor Calls    Caller's first and last name: Chandra Walters/ wife      Reason for call: Pt's surgery complications/ today      Callback required yes/no and why: yes to discuss further action      Best contact number(s): [phone]      Details to clarify the request: Pt is requesting a call back for further instructions. Wife advised that pt is supposed to have surgery at 10:30 at the  [facility], but there is an issue and would like advise.       Evan Torres

## 2020-11-18 ENCOUNTER — TELEPHONE (OUTPATIENT)
Dept: FAMILY MEDICINE CLINIC | Age: 55
End: 2020-11-18
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
## 2020-11-18 NOTE — LETTER
NOTIFICATION RETURN TO WORK / SCHOOL 
 
11/18/2020 5:16 PM 
 
Mr. Ruiz Luis SUAREZ [phone] [id number] To Whom It May Concern: 
 
Patient is to return to work on 11/20/200. Surgery for 11/16/20 was postponed by surgeon. New date is to be determined by surgeon and pending repeat Covid test that is to be done on 11/22/20. Patient is able to return to normal schedule without restrictions.   
 
 
 
Sincerely, 
 
 
Kale Kohler MD

## 2020-11-18 NOTE — TELEPHONE ENCOUNTER
Patient wife states that her  is feeling much better she would like for Roseline Thacker to give her a call @ [phone]

## 2020-11-19 ENCOUNTER — PATIENT MESSAGE (OUTPATIENT)
Dept: FAMILY MEDICINE CLINIC | Age: 55
End: 2020-11-19

## 2020-12-15 DIAGNOSIS — F98.8 ATTENTION DEFICIT DISORDER (ADD) WITHOUT HYPERACTIVITY: ICD-10-CM

## 2020-12-15 RX ORDER — OLMESARTAN MEDOXOMIL AND HYDROCHLOROTHIAZIDE 20/12.5 20; 12.5 MG/1; MG/1
TABLET ORAL
Qty: 90 TAB | Refills: 3 | Status: SHIPPED | OUTPATIENT
Start: 2020-12-15 | End: 2022-01-03

## 2020-12-15 RX ORDER — TADALAFIL 20 MG/1
20 TABLET ORAL AS NEEDED
Qty: 5 TAB | Refills: 12 | Status: SHIPPED | OUTPATIENT
Start: 2020-12-15 | End: 2021-07-22 | Stop reason: SDUPTHER

## 2020-12-15 RX ORDER — ATOMOXETINE 40 MG/1
40 CAPSULE ORAL DAILY
Qty: 90 CAP | Refills: 3 | Status: SHIPPED | OUTPATIENT
Start: 2020-12-15 | End: 2022-02-22 | Stop reason: SDUPTHER

## 2020-12-15 RX ORDER — DEXTROAMPHETAMINE SACCHARATE, AMPHETAMINE ASPARTATE MONOHYDRATE, DEXTROAMPHETAMINE SULFATE AND AMPHETAMINE SULFATE 5; 5; 5; 5 MG/1; MG/1; MG/1; MG/1
CAPSULE, EXTENDED RELEASE ORAL
Qty: 60 CAP | Refills: 0 | Status: SHIPPED | OUTPATIENT
Start: 2020-12-15 | End: 2021-01-29 | Stop reason: SDUPTHER

## 2021-01-29 DIAGNOSIS — F98.8 ATTENTION DEFICIT DISORDER (ADD) WITHOUT HYPERACTIVITY: ICD-10-CM

## 2021-01-29 NOTE — TELEPHONE ENCOUNTER
Patient is requesting a RX refill amphetamine-dextroamphetamine XR (ADDERALL XR) 20 mg XR capsule he can be reached @ [phone]

## 2021-01-31 RX ORDER — DEXTROAMPHETAMINE SACCHARATE, AMPHETAMINE ASPARTATE MONOHYDRATE, DEXTROAMPHETAMINE SULFATE AND AMPHETAMINE SULFATE 5; 5; 5; 5 MG/1; MG/1; MG/1; MG/1
CAPSULE, EXTENDED RELEASE ORAL
Qty: 60 CAP | Refills: 0 | Status: SHIPPED | OUTPATIENT
Start: 2021-01-31 | End: 2021-03-04 | Stop reason: SDUPTHER

## 2021-03-04 ENCOUNTER — VIRTUAL VISIT (OUTPATIENT)
Dept: FAMILY MEDICINE CLINIC | Age: 56
End: 2021-03-04
Payer: COMMERCIAL

## 2021-03-04 DIAGNOSIS — F98.8 ATTENTION DEFICIT DISORDER (ADD) WITHOUT HYPERACTIVITY: ICD-10-CM

## 2021-03-04 PROCEDURE — 99213 OFFICE O/P EST LOW 20 MIN: CPT | Performed by: FAMILY MEDICINE

## 2021-03-04 RX ORDER — DEXTROAMPHETAMINE SACCHARATE, AMPHETAMINE ASPARTATE MONOHYDRATE, DEXTROAMPHETAMINE SULFATE AND AMPHETAMINE SULFATE 5; 5; 5; 5 MG/1; MG/1; MG/1; MG/1
CAPSULE, EXTENDED RELEASE ORAL
Qty: 60 CAP | Refills: 0 | Status: SHIPPED | OUTPATIENT
Start: 2021-03-04 | End: 2021-04-29 | Stop reason: SDUPTHER

## 2021-03-04 NOTE — PROGRESS NOTES
HISTORY OF PRESENT ILLNESS  Patricia Klein is a 48 y.o. male. HPI Consent:  Pt. was seen by synchronous (real-time) audio- video technololgy, and/or her healthcare decision maker, is aware that this patient-initiated Telehealth encounter on   is a billable service, with coverage as determined by her insurance carrier. They are aware that they may receive a bill. In for followup. Takes adderall for Adderall for ADD. Still working and elaine for his wife who has lots of health problems. Has eye muscle surgery scheduled for this summer. Gets episodes of fatigue at times. ROS    Physical Exam    ASSESSMENT and PLAN  Orders Placed This Encounter    amphetamine-dextroamphetamine XR (ADDERALL XR) 20 mg XR capsule     Diagnoses and all orders for this visit:    1. Attention deficit disorder (ADD) without hyperactivity  -     amphetamine-dextroamphetamine XR (ADDERALL XR) 20 mg XR capsule; 2 caps po daily  Indications: attention deficit disorder with hyperactivity          Pt was evaluated by a video visit encounter for concerns as discussed above. A caregiver was present when appropriate. Due to this being a TeleHealth encounter ([facility]) physical exam was limited. Pursuant to the emergency declaration under the [address] [place] [address], this Virtual Visit was conducted, with patients (and/or legal guardians's) consent, to reduce the patient's risk of exposure to COVID -19 and provide necessary medical care. Services were provided through a video synchronous discussion virtually to substitute for in-person clinic visit. Patient and provider were located at their individual homes.

## 2021-03-04 NOTE — PROGRESS NOTES
Chief Complaint   Patient presents with    Hypertension    Medication Refill     1. Have you been to the ER, urgent care clinic since your last visit? Hospitalized since your last visit? No    2. Have you seen or consulted any other health care providers outside of the [address] [place] [address] since your last visit? Include any pap smears or colon screening.  No

## 2021-04-05 ENCOUNTER — DOCUMENTATION ONLY (OUTPATIENT)
Dept: FAMILY MEDICINE CLINIC | Age: 56
End: 2021-04-05

## 2021-04-29 DIAGNOSIS — F98.8 ATTENTION DEFICIT DISORDER (ADD) WITHOUT HYPERACTIVITY: ICD-10-CM

## 2021-05-03 RX ORDER — DEXTROAMPHETAMINE SACCHARATE, AMPHETAMINE ASPARTATE MONOHYDRATE, DEXTROAMPHETAMINE SULFATE AND AMPHETAMINE SULFATE 5; 5; 5; 5 MG/1; MG/1; MG/1; MG/1
CAPSULE, EXTENDED RELEASE ORAL
Qty: 60 CAP | Refills: 0 | Status: SHIPPED | OUTPATIENT
Start: 2021-05-03 | End: 2021-06-10 | Stop reason: SDUPTHER

## 2021-06-10 DIAGNOSIS — F98.8 ATTENTION DEFICIT DISORDER (ADD) WITHOUT HYPERACTIVITY: ICD-10-CM

## 2021-06-10 RX ORDER — DEXTROAMPHETAMINE SACCHARATE, AMPHETAMINE ASPARTATE MONOHYDRATE, DEXTROAMPHETAMINE SULFATE AND AMPHETAMINE SULFATE 5; 5; 5; 5 MG/1; MG/1; MG/1; MG/1
CAPSULE, EXTENDED RELEASE ORAL
Qty: 60 CAPSULE | Refills: 0 | Status: SHIPPED | OUTPATIENT
Start: 2021-06-10 | End: 2021-07-22 | Stop reason: SDUPTHER

## 2021-06-22 ENCOUNTER — DOCUMENTATION ONLY (OUTPATIENT)
Dept: SLEEP MEDICINE | Age: 56
End: 2021-06-22

## 2021-06-22 ENCOUNTER — TELEPHONE (OUTPATIENT)
Dept: SLEEP MEDICINE | Age: 56
End: 2021-06-22

## 2021-06-22 DIAGNOSIS — G47.33 OBSTRUCTIVE SLEEP APNEA (ADULT) (PEDIATRIC): Primary | ICD-10-CM

## 2021-06-22 NOTE — TELEPHONE ENCOUNTER
Patient's wife called stating that the patient is having eye surgery on Thursday and is a current CPAP user with a full face mask. Due to the eyewear that the patient has to use in recovery for 2 weeks won't allow a seal for his FF mask. Pt would like to see if he can get a nasal mask to use for that time period of recovery. Wife can be reached at [phone].

## 2021-06-23 ENCOUNTER — TELEPHONE (OUTPATIENT)
Dept: SLEEP MEDICINE | Age: 56
End: 2021-06-23

## 2021-06-23 ENCOUNTER — OFFICE VISIT (OUTPATIENT)
Dept: FAMILY MEDICINE CLINIC | Age: 56
End: 2021-06-23
Payer: COMMERCIAL

## 2021-06-23 VITALS
HEIGHT: 70 IN | SYSTOLIC BLOOD PRESSURE: 122 MMHG | RESPIRATION RATE: 16 BRPM | HEART RATE: 77 BPM | OXYGEN SATURATION: 98 % | WEIGHT: 253 LBS | BODY MASS INDEX: 36.22 KG/M2 | TEMPERATURE: 98.3 F | DIASTOLIC BLOOD PRESSURE: 78 MMHG

## 2021-06-23 DIAGNOSIS — I10 ESSENTIAL HYPERTENSION: Primary | ICD-10-CM

## 2021-06-23 DIAGNOSIS — Z12.11 COLON CANCER SCREENING: ICD-10-CM

## 2021-06-23 DIAGNOSIS — S13.9XXA NECK SPRAIN, INITIAL ENCOUNTER: ICD-10-CM

## 2021-06-23 DIAGNOSIS — E78.00 HYPERCHOLESTEROLEMIA: ICD-10-CM

## 2021-06-23 DIAGNOSIS — S33.5XXA LUMBAR SPRAIN, INITIAL ENCOUNTER: ICD-10-CM

## 2021-06-23 PROCEDURE — 99213 OFFICE O/P EST LOW 20 MIN: CPT | Performed by: FAMILY MEDICINE

## 2021-06-23 NOTE — TELEPHONE ENCOUNTER
Milvia from local office called with great recovery. She reiterates that the situation perhaps stemmed from the 3rd party resupply office but never the less she was focused on moving forward and getting the patient the care he needed. She called and spoke to Mr. Alex Hummel who will  some nasal pillows today from their office.
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
## 2021-06-23 NOTE — TELEPHONE ENCOUNTER
Patient called claiming to be hung up on continuously. She stated she called \"17 times\" and got hung up on every time. Unclear if her first call resulted in option to leave a message. Said she spoke with someone that transferred her and then got hung up on. She is very upset stating there is urgency to her order as her  is getting surgery that limits his ability to wear supplies he is in possesion of and needs new ones. She understands that we sent an order to [facility] yesterday. She called our office upset at her lack of care. She insists I [place] [place]. Spoke to [facility] in the resupply department who states she will kindly call her back to service her. I provided my name to assist them with whatever I can. Then someone from the local office called me and said they would also reach out to make sure she was serviced. She does mention he was just shipped supplies on 6/12. Insurance covers mask frame every three months and headgear once every 6 months. Will have to pay out of pocket.

## 2021-06-24 LAB
ALBUMIN SERPL-MCNC: 4.4 G/DL (ref 3.8–4.9)
ALBUMIN/GLOB SERPL: 1.5 {RATIO} (ref 1.2–2.2)
ALP SERPL-CCNC: 64 IU/L (ref 48–121)
ALT SERPL-CCNC: 21 IU/L (ref 0–44)
AST SERPL-CCNC: 20 IU/L (ref 0–40)
BASOPHILS # BLD AUTO: 0.1 X10E3/UL (ref 0–0.2)
BASOPHILS NFR BLD AUTO: 1 %
BILIRUB SERPL-MCNC: 0.2 MG/DL (ref 0–1.2)
BUN SERPL-MCNC: 22 MG/DL (ref 6–24)
BUN/CREAT SERPL: 23 (ref 9–20)
CALCIUM SERPL-MCNC: 9.7 MG/DL (ref 8.7–10.2)
CHLORIDE SERPL-SCNC: 100 MMOL/L (ref 96–106)
CHOLEST SERPL-MCNC: 252 MG/DL (ref 100–199)
CO2 SERPL-SCNC: 23 MMOL/L (ref 20–29)
CREAT SERPL-MCNC: 0.96 MG/DL (ref 0.76–1.27)
EOSINOPHIL # BLD AUTO: 0.3 X10E3/UL (ref 0–0.4)
EOSINOPHIL NFR BLD AUTO: 5 %
ERYTHROCYTE [DISTWIDTH] IN BLOOD BY AUTOMATED COUNT: 14.2 % (ref 11.6–15.4)
GLOBULIN SER CALC-MCNC: 3 G/DL (ref 1.5–4.5)
GLUCOSE SERPL-MCNC: 118 MG/DL (ref 65–99)
HCT VFR BLD AUTO: 42 % (ref 37.5–51)
HDLC SERPL-MCNC: 31 MG/DL
HGB BLD-MCNC: 14.5 G/DL (ref 13–17.7)
IMM GRANULOCYTES # BLD AUTO: 0 X10E3/UL (ref 0–0.1)
IMM GRANULOCYTES NFR BLD AUTO: 0 %
IMP & REVIEW OF LAB RESULTS: NORMAL
LDLC SERPL CALC-MCNC: 104 MG/DL (ref 0–99)
LYMPHOCYTES # BLD AUTO: 2.4 X10E3/UL (ref 0.7–3.1)
LYMPHOCYTES NFR BLD AUTO: 48 %
MCH RBC QN AUTO: 30.8 PG (ref 26.6–33)
MCHC RBC AUTO-ENTMCNC: 34.5 G/DL (ref 31.5–35.7)
MCV RBC AUTO: 89 FL (ref 79–97)
MONOCYTES # BLD AUTO: 0.4 X10E3/UL (ref 0.1–0.9)
MONOCYTES NFR BLD AUTO: 8 %
NEUTROPHILS # BLD AUTO: 1.9 X10E3/UL (ref 1.4–7)
NEUTROPHILS NFR BLD AUTO: 38 %
PLATELET # BLD AUTO: 187 X10E3/UL (ref 150–450)
POTASSIUM SERPL-SCNC: 4.2 MMOL/L (ref 3.5–5.2)
PROT SERPL-MCNC: 7.4 G/DL (ref 6–8.5)
RBC # BLD AUTO: 4.71 X10E6/UL (ref 4.14–5.8)
SODIUM SERPL-SCNC: 139 MMOL/L (ref 134–144)
TRIGL SERPL-MCNC: 680 MG/DL (ref 0–149)
VLDLC SERPL CALC-MCNC: 117 MG/DL (ref 5–40)
WBC # BLD AUTO: 5 X10E3/UL (ref 3.4–10.8)

## 2021-06-28 ENCOUNTER — TELEPHONE (OUTPATIENT)
Dept: FAMILY MEDICINE CLINIC | Age: 56
End: 2021-06-28

## 2021-06-28 DIAGNOSIS — E78.2 ELEVATED TRIGLYCERIDES WITH HIGH CHOLESTEROL: Primary | ICD-10-CM

## 2021-07-02 ENCOUNTER — APPOINTMENT (OUTPATIENT)
Dept: FAMILY MEDICINE CLINIC | Age: 56
End: 2021-07-02

## 2021-07-07 LAB
CHOLEST SERPL-MCNC: 227 MG/DL (ref 100–199)
HDLC SERPL-MCNC: 31 MG/DL
IMP & REVIEW OF LAB RESULTS: NORMAL
LDLC SERPL CALC-MCNC: 119 MG/DL (ref 0–99)
SPECIMEN STATUS REPORT, ROLRST: NORMAL
TRIGL SERPL-MCNC: 435 MG/DL (ref 0–149)
VLDLC SERPL CALC-MCNC: 77 MG/DL (ref 5–40)

## 2021-07-09 ENCOUNTER — TELEPHONE (OUTPATIENT)
Dept: FAMILY MEDICINE CLINIC | Age: 56
End: 2021-07-09

## 2021-07-09 NOTE — LETTER
NOTIFICATION RETURN TO WORK / SCHOOL    7/9/2021 11:52 AM    Mr. Charlotte Barrera [id number]      To Whom It May Concern:    Tawanna Del Valle is currently under the care of [facility]. Patient cleared to return to work without restrictions. He will return to work/school on: 7/10/21    If there are questions or concerns please have the patient contact our office.         Sincerely,      Justin Crespo MD

## 2021-07-22 DIAGNOSIS — F98.8 ATTENTION DEFICIT DISORDER (ADD) WITHOUT HYPERACTIVITY: ICD-10-CM

## 2021-07-23 ENCOUNTER — TELEPHONE (OUTPATIENT)
Dept: SLEEP MEDICINE | Age: 56
End: 2021-07-23

## 2021-07-23 DIAGNOSIS — G47.33 OBSTRUCTIVE SLEEP APNEA (ADULT) (PEDIATRIC): Primary | ICD-10-CM

## 2021-07-23 RX ORDER — TADALAFIL 20 MG/1
20 TABLET ORAL AS NEEDED
Qty: 5 TABLET | Refills: 12 | Status: SHIPPED | OUTPATIENT
Start: 2021-07-23 | End: 2021-08-16 | Stop reason: SDUPTHER

## 2021-07-23 RX ORDER — DEXTROAMPHETAMINE SACCHARATE, AMPHETAMINE ASPARTATE MONOHYDRATE, DEXTROAMPHETAMINE SULFATE AND AMPHETAMINE SULFATE 5; 5; 5; 5 MG/1; MG/1; MG/1; MG/1
CAPSULE, EXTENDED RELEASE ORAL
Qty: 60 CAPSULE | Refills: 0 | Status: SHIPPED | OUTPATIENT
Start: 2021-07-23 | End: 2021-09-09 | Stop reason: SDUPTHER

## 2021-07-23 NOTE — TELEPHONE ENCOUNTER
Mrs. Nataly Nava called and stated her  recently had surgery. She stated he tried the nasal pillows mask and is doing well. She wanted to know if that was okay. She can be reached at [phone].

## 2021-07-23 NOTE — TELEPHONE ENCOUNTER
Post-surgery patient is doing very well with nasal pillow mask and would like prescription form moving forward with nasal pillows over full face mask if acceptable per physician. . Rolf Baig did share that she was concerned about her experience with [facility] and will investigate seeing what other Durable medical equipment providers were approved through her insurance policy.

## 2021-07-27 NOTE — TELEPHONE ENCOUNTER
Patient wished to know could he switch back and forth between full face and nasal pillow masks. He was instructed it is better to stay with one mask for consistency in sleep efficiency then two switch back .  He shared that the only time that it would truly be beneficial is when they need for a full face mask presented itself d0 to nasal congestion etc.

## 2021-07-30 ENCOUNTER — OFFICE VISIT (OUTPATIENT)
Dept: FAMILY MEDICINE CLINIC | Age: 56
End: 2021-07-30
Payer: COMMERCIAL

## 2021-07-30 VITALS
WEIGHT: 254.6 LBS | TEMPERATURE: 97.5 F | SYSTOLIC BLOOD PRESSURE: 108 MMHG | BODY MASS INDEX: 36.45 KG/M2 | DIASTOLIC BLOOD PRESSURE: 61 MMHG | RESPIRATION RATE: 16 BRPM | HEIGHT: 70 IN | OXYGEN SATURATION: 97 % | HEART RATE: 93 BPM

## 2021-07-30 DIAGNOSIS — M65.312 TRIGGER FINGER OF LEFT THUMB: ICD-10-CM

## 2021-07-30 DIAGNOSIS — M79.645 PAIN IN FINGER OF LEFT HAND: Primary | ICD-10-CM

## 2021-07-30 DIAGNOSIS — E66.01 SEVERE OBESITY (BMI 35.0-35.9 WITH COMORBIDITY) (HCC): ICD-10-CM

## 2021-07-30 PROCEDURE — 99213 OFFICE O/P EST LOW 20 MIN: CPT | Performed by: FAMILY MEDICINE

## 2021-07-30 PROCEDURE — 20600 DRAIN/INJ JOINT/BURSA W/O US: CPT | Performed by: FAMILY MEDICINE

## 2021-07-30 RX ORDER — METHYLPREDNISOLONE ACETATE 40 MG/ML
40 INJECTION, SUSPENSION INTRA-ARTICULAR; INTRALESIONAL; INTRAMUSCULAR; SOFT TISSUE ONCE
Qty: 1 ML | Refills: 0
Start: 2021-07-30 | End: 2021-07-30
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
## 2021-08-17 RX ORDER — TADALAFIL 20 MG/1
20 TABLET ORAL AS NEEDED
Qty: 5 TABLET | Refills: 12 | Status: SHIPPED | OUTPATIENT
Start: 2021-08-17 | End: 2021-10-07 | Stop reason: SDUPTHER

## 2021-09-09 DIAGNOSIS — F98.8 ATTENTION DEFICIT DISORDER (ADD) WITHOUT HYPERACTIVITY: ICD-10-CM

## 2021-09-12 RX ORDER — DEXTROAMPHETAMINE SACCHARATE, AMPHETAMINE ASPARTATE MONOHYDRATE, DEXTROAMPHETAMINE SULFATE AND AMPHETAMINE SULFATE 5; 5; 5; 5 MG/1; MG/1; MG/1; MG/1
CAPSULE, EXTENDED RELEASE ORAL
Qty: 60 CAPSULE | Refills: 0 | Status: SHIPPED | OUTPATIENT
Start: 2021-09-12 | End: 2021-10-23 | Stop reason: SDUPTHER

## 2021-10-05 ENCOUNTER — OFFICE VISIT (OUTPATIENT)
Dept: SLEEP MEDICINE | Age: 56
End: 2021-10-05
Payer: COMMERCIAL

## 2021-10-05 ENCOUNTER — DOCUMENTATION ONLY (OUTPATIENT)
Dept: SLEEP MEDICINE | Age: 56
End: 2021-10-05

## 2021-10-05 VITALS
TEMPERATURE: 99.7 F | BODY MASS INDEX: 35.58 KG/M2 | RESPIRATION RATE: 20 BRPM | DIASTOLIC BLOOD PRESSURE: 67 MMHG | OXYGEN SATURATION: 95 % | WEIGHT: 248 LBS | HEART RATE: 103 BPM | SYSTOLIC BLOOD PRESSURE: 115 MMHG

## 2021-10-05 DIAGNOSIS — I10 ESSENTIAL HYPERTENSION: ICD-10-CM

## 2021-10-05 DIAGNOSIS — G47.33 OBSTRUCTIVE SLEEP APNEA (ADULT) (PEDIATRIC): Primary | ICD-10-CM

## 2021-10-05 PROCEDURE — 99213 OFFICE O/P EST LOW 20 MIN: CPT | Performed by: INTERNAL MEDICINE

## 2021-10-05 NOTE — PATIENT INSTRUCTIONS
[address] [place]. [place], [address]  Tel.  [phone]  Fax. [address] [facility] [address]  [place], [address]  Tel.  [phone]  Fax. [phone] [address] [facility] [place]ja [place]  Tel.  [phone]  Fax. [phone]     PROPER SLEEP HYGIENE    What to avoid  · Do not have drinks with caffeine, such as coffee or black tea, for 8 hours before bed. · Do not smoke or use other types of tobacco near bedtime. Nicotine is a stimulant and can keep you awake. · Avoid drinking alcohol late in the evening, because it can cause you to wake in the middle of the night. · Do not eat a big meal close to bedtime. If you are hungry, eat a light snack. · Do not drink a lot of water close to bedtime, because the need to urinate may wake you up during the night. · Do not read or watch TV in bed. Use the bed only for sleeping and sexual activity. What to try  · Go to bed at the same time every night, and wake up at the same time every morning. Do not take naps during the day. · Keep your bedroom quiet, dark, and cool. · Get regular exercise, but not within 3 to 4 hours of your bedtime. .  · Sleep on a comfortable pillow and mattress. · If watching the clock makes you anxious, turn it facing away from you so you cannot see the time. · If you worry when you lie down, start a worry book. Well before bedtime, write down your worries, and then set the book and your concerns aside. · Try meditation or other relaxation techniques before you go to bed. · If you cannot fall asleep, get up and go to another room until you feel sleepy. Do something relaxing. Repeat your bedtime routine before you go to bed again. · Make your house quiet and calm about an hour before bedtime. Turn down the lights, turn off the TV, log off the computer, and turn down the volume on music. This can help you relax after a busy day.     Drowsy Driving  The [address] [place] [address] Road Traffic Safety Administration cites drowsiness as a causing factor in more than 958,161 police reported crashes annually, resulting in 76,000 injuries and 1,500 deaths. Other surveys suggest 55% of people polled have driven while drowsy in the past year, 23% had fallen asleep but not crashed, 3% crashed, and 2% had and accident due to drowsy driving. Who is at risk? Young Drivers: One study of drowsy driving accidents states that 55% of the drivers were under 25 years. Of those, 75% were male. Shift Workers and Travelers: People who work overnight or travel across time zones frequently are at higher risk of experiencing Circadian Rhythm Disorders. They are trying to work and function when their body is programed to sleep. Sleep Deprived: Lack of sleep has a serious impact on your ability to pay attention or focus on a task. Consistently getting less than the average of 8 hours your body needs creates partial or cumulative sleep deprivation. Untreated Sleep Disorders: Sleep Apnea, Narcolepsy, R.L.S., and other sleep disorders (untreated) prevent a person from getting enough restful sleep. This leads to excessive daytime sleepiness and increases the risk for drowsy driving accidents by up to 7 times. Medications / Alcohol: Even over the counter medications can cause drowsiness. Medications that impair a drivers attention should have a warning label. Alcohol naturally makes you sleepy and on its own can cause accidents. Combined with excessive drowsiness its effects are amplified. Signs of Drowsy Driving:   * You don't remember driving the last few miles   * You may drift out of your nagi   * You are unable to focus and your thoughts wander   * You may yawn more often than normal   * You have difficulty keeping your eyes open / nodding off   * Missing traffic signs, speeding, or tailgating  Prevention-   Good sleep hygiene, lifestyle and behavioral choices have the most impact on drowsy driving.  There is no substitute for sleep and the average person requires 8 hours nightly. If you find yourself driving drowsy, stop and sleep. Consider the sleep hygiene tips provided during your visit as well. Medication Refill Policy: Refills for all medications require 1 week advance notice. Please have your pharmacy fax a refill request. We are unable to fax, or call in \"controled substance\" medications and you will need to pick these prescriptions up from our office. [email or web address] Activation    Thank you for requesting access to [employer]. Please follow the instructions below to securely access and download your online medical record. [employer] allows you to send messages to your doctor, view your test results, renew your prescriptions, schedule appointments, and more. How Do I Sign Up? 1. In your internet browser, go to [email or web address]. 2. Click on the First Time User? Click Here link in the Sign In box. You will see the New Member Sign Up page. 3. Enter your [employer] Access Code exactly as it appears below. You will not need to use this code after youve completed the sign-up process. If you do not sign up before the expiration date, you must request a new code. [employer] Access Code: Activation code not generated  Current [employer] Status: Active (This is the date your [employer] access code will )    4. Enter the last four digits of your Social Security Number (xxxx) and Date of Birth (mm/dd/yyyy) as indicated and click Submit. You will be taken to the next sign-up page. 5. Create a [employer] ID. This will be your [employer] login ID and cannot be changed, so think of one that is secure and easy to remember. 6. Create a [employer] password. You can change your password at any time. 7. Enter your Password Reset Question and Answer. This can be used at a later time if you forget your password. 8. Enter your e-mail address. You will receive e-mail notification when new information is available in [address]   9. Click Sign Up. You can now view and download portions of your medical record. 10. Click the Download Summary menu link to download a portable copy of your medical information. Additional Information    If you have questions, please call [phone]. Remember, [employer] is NOT to be used for urgent needs. For medical emergencies, dial 911.

## 2021-10-05 NOTE — PROGRESS NOTES
[address] [place]. [place], [address]  Tel.  [phone]  Fax. [address]  [place], [address]  Tel.  [phone]  Fax. [phone] [address] [facility] [place]  Tel.  [phone]  Fax. [phone]     S>Michele Turpin. is a 55 y.o. male seen for a positive airway pressure follow-up. He reports no problems using the device. The following problems are identified:    Drowsiness Sometimes- not getting enough sleep Problems exhaling no   Snoring no Forget to put on no   Mask Comfortable yes Can't fall asleep no   Dry Mouth no Mask falls off no   Air Leaking no Frequent awakenings no     Download reviewed. Compliance at 87%  He admits that his sleep has improved. Therapy Apnea Index averaged over PAP use: 2 /hr which reflects significantly improved sleep breathing condition. No Known Allergies    He has a current medication list which includes the following prescription(s): amphetamine-dextroamphetamine xr, tadalafil, olmesartan-hydrochlorothiazide, atomoxetine, ergocalciferol, and unithroid. Tamiko Gutierrez He  has a past medical history of Chronic back pain, Hypertension, Sleep apnea, and Thyroid disease. [place] Sleepiness Score: 12   and Modified F.O.S.Q. Score Total / 2: 16.5      O>    Visit Vitals  /67 (BP 1 Location: Right arm, BP Patient Position: Sitting, BP Cuff Size: Large adult)   Pulse (!) 103   Temp 99.7 °F (37.6 °C) (Oral)   Resp 20   Wt 248 lb (112.5 kg)   SpO2 95%   BMI 35.58 kg/m²           General:   Alert, oriented, not in distress   Neck:   No JVD    Chest/Lungs:  symetrical lung expansion , no accessory muscle use    Extremities:  no obvious rashes , negative edema    Neuro:  No focal deficits ; No obvious tremor    Psych:  Normal affect ,  Normal countenance ;         A>    ICD-10-CM ICD-9-CM    1. Obstructive sleep apnea (adult) (pediatric)  G47.33 327.23 AMB SUPPLY ORDER   2.  Essential hypertension  I10 401.9      AHI = 39(2017). On CPAP, Resmed :  7-15 cmH2O. Compliant:      yes    Therapeutic Response:  Positive    P>      *   Follow-up and Dispositions    · Return in about 1 year (around 10/5/2022). he is compliant with PAP therapy and PAP continues to benefit patient and remains necessary for control of his sleep apnea. I have ordered replacement supplies  he will continue on his current pressure settings. Proper sleep hygiene reviewed with emphasis on ensuring sufficient sleep time (7 hours nightly)    * He was asked to contact our office for any problems regarding PAP therapy. * Counseling was provided regarding the importance of regular PAP use and on proper sleep hygiene and safe driving. * Re-enforced proper and regular cleaning for the device. 2. Hypertension - he continues on his current regimen. I have reviewed the relationship between hypertension as it relates to sleep-disordered breathing.      Electronically signed by    Suresh Jasso MD  Diplomate in Sleep Medicine  [facility]    10/5/2021

## 2021-10-08 RX ORDER — TADALAFIL 20 MG/1
20 TABLET ORAL AS NEEDED
Qty: 5 TABLET | Refills: 12 | Status: SHIPPED | OUTPATIENT
Start: 2021-10-08 | End: 2021-11-12 | Stop reason: SDUPTHER

## 2021-10-23 DIAGNOSIS — F98.8 ATTENTION DEFICIT DISORDER (ADD) WITHOUT HYPERACTIVITY: ICD-10-CM

## 2021-10-25 RX ORDER — DEXTROAMPHETAMINE SACCHARATE, AMPHETAMINE ASPARTATE MONOHYDRATE, DEXTROAMPHETAMINE SULFATE AND AMPHETAMINE SULFATE 5; 5; 5; 5 MG/1; MG/1; MG/1; MG/1
CAPSULE, EXTENDED RELEASE ORAL
Qty: 60 CAPSULE | Refills: 0 | Status: SHIPPED | OUTPATIENT
Start: 2021-10-25 | End: 2021-12-02 | Stop reason: SDUPTHER

## 2021-11-15 RX ORDER — TADALAFIL 20 MG/1
20 TABLET ORAL AS NEEDED
Qty: 5 TABLET | Refills: 12 | Status: SHIPPED | OUTPATIENT
Start: 2021-11-15 | End: 2022-06-29 | Stop reason: SDUPTHER

## 2021-12-02 DIAGNOSIS — F98.8 ATTENTION DEFICIT DISORDER (ADD) WITHOUT HYPERACTIVITY: ICD-10-CM

## 2021-12-03 RX ORDER — DEXTROAMPHETAMINE SACCHARATE, AMPHETAMINE ASPARTATE MONOHYDRATE, DEXTROAMPHETAMINE SULFATE AND AMPHETAMINE SULFATE 5; 5; 5; 5 MG/1; MG/1; MG/1; MG/1
CAPSULE, EXTENDED RELEASE ORAL
Qty: 60 CAPSULE | Refills: 0 | Status: SHIPPED | OUTPATIENT
Start: 2021-12-03 | End: 2022-01-11 | Stop reason: SDUPTHER

## 2022-01-03 RX ORDER — OLMESARTAN MEDOXOMIL AND HYDROCHLOROTHIAZIDE 20/12.5 20; 12.5 MG/1; MG/1
TABLET ORAL
Qty: 90 TABLET | Refills: 1 | Status: SHIPPED | OUTPATIENT
Start: 2022-01-03 | End: 2022-06-18 | Stop reason: SDUPTHER

## 2022-01-11 DIAGNOSIS — F98.8 ATTENTION DEFICIT DISORDER (ADD) WITHOUT HYPERACTIVITY: ICD-10-CM

## 2022-01-17 RX ORDER — DEXTROAMPHETAMINE SACCHARATE, AMPHETAMINE ASPARTATE MONOHYDRATE, DEXTROAMPHETAMINE SULFATE AND AMPHETAMINE SULFATE 5; 5; 5; 5 MG/1; MG/1; MG/1; MG/1
CAPSULE, EXTENDED RELEASE ORAL
Qty: 60 CAPSULE | Refills: 0 | Status: SHIPPED | OUTPATIENT
Start: 2022-01-17 | End: 2022-02-22 | Stop reason: SDUPTHER

## 2022-02-22 DIAGNOSIS — F98.8 ATTENTION DEFICIT DISORDER (ADD) WITHOUT HYPERACTIVITY: ICD-10-CM

## 2022-02-22 RX ORDER — DEXTROAMPHETAMINE SACCHARATE, AMPHETAMINE ASPARTATE MONOHYDRATE, DEXTROAMPHETAMINE SULFATE AND AMPHETAMINE SULFATE 5; 5; 5; 5 MG/1; MG/1; MG/1; MG/1
CAPSULE, EXTENDED RELEASE ORAL
Qty: 60 CAPSULE | Refills: 0 | Status: SHIPPED | OUTPATIENT
Start: 2022-02-22 | End: 2022-04-01 | Stop reason: SDUPTHER

## 2022-02-22 RX ORDER — ATOMOXETINE 40 MG/1
40 CAPSULE ORAL DAILY
Qty: 90 CAPSULE | Refills: 3 | Status: SHIPPED | OUTPATIENT
Start: 2022-02-22 | End: 2022-04-21 | Stop reason: SDUPTHER

## 2022-04-01 DIAGNOSIS — F98.8 ATTENTION DEFICIT DISORDER (ADD) WITHOUT HYPERACTIVITY: ICD-10-CM

## 2022-04-01 RX ORDER — DEXTROAMPHETAMINE SACCHARATE, AMPHETAMINE ASPARTATE MONOHYDRATE, DEXTROAMPHETAMINE SULFATE AND AMPHETAMINE SULFATE 5; 5; 5; 5 MG/1; MG/1; MG/1; MG/1
CAPSULE, EXTENDED RELEASE ORAL
Qty: 60 CAPSULE | Refills: 0 | Status: SHIPPED | OUTPATIENT
Start: 2022-04-01 | End: 2022-04-04 | Stop reason: SDUPTHER

## 2022-04-01 NOTE — TELEPHONE ENCOUNTER
Last visit:21  Next visit: not scheduled  Previous refill 22(60+0R)    Requested Prescriptions     Pending Prescriptions Disp Refills    amphetamine-dextroamphetamine XR (ADDERALL XR) 20 mg XR capsule 60 Capsule 0     Si caps po daily  Indications: attention deficit disorder with hyperactivity     Please review  before prescribing new script for this medication.      Thanks,  Bethel Lundy

## 2022-04-04 ENCOUNTER — TELEPHONE (OUTPATIENT)
Dept: FAMILY MEDICINE CLINIC | Age: 57
End: 2022-04-04

## 2022-04-04 DIAGNOSIS — F98.8 ATTENTION DEFICIT DISORDER (ADD) WITHOUT HYPERACTIVITY: ICD-10-CM

## 2022-04-04 RX ORDER — DEXTROAMPHETAMINE SACCHARATE, AMPHETAMINE ASPARTATE MONOHYDRATE, DEXTROAMPHETAMINE SULFATE AND AMPHETAMINE SULFATE 5; 5; 5; 5 MG/1; MG/1; MG/1; MG/1
CAPSULE, EXTENDED RELEASE ORAL
Qty: 60 CAPSULE | Refills: 0 | Status: SHIPPED | OUTPATIENT
Start: 2022-04-04 | End: 2022-05-10 | Stop reason: SDUPTHER

## 2022-04-04 RX ORDER — DEXTROAMPHETAMINE SACCHARATE, AMPHETAMINE ASPARTATE MONOHYDRATE, DEXTROAMPHETAMINE SULFATE AND AMPHETAMINE SULFATE 5; 5; 5; 5 MG/1; MG/1; MG/1; MG/1
CAPSULE, EXTENDED RELEASE ORAL
Qty: 60 CAPSULE | Refills: 0 | Status: CANCELLED | OUTPATIENT
Start: 2022-04-04

## 2022-04-04 NOTE — TELEPHONE ENCOUNTER
Patient mother is requesting an emergency refill amphetamine-dextroamphetamine XR (ADDERALL XR) 20 mg XR capsule, went to [facility] on [address] they told him it's on back order can  refill it again and send it to pharmacy on file please call them right away she can be reached @ [phone]

## 2022-04-04 NOTE — TELEPHONE ENCOUNTER
Spoke with pharmacy and verified new order received and they will fill for what they can. Pt's spouse informed.

## 2022-04-04 NOTE — TELEPHONE ENCOUNTER
Patient's wife called. She said that patient's rx's were never received. Please call @[phone]. Dr. Darren Foley called them in earlier, but it is not at pharmacy. Wife is upset.

## 2022-04-22 ENCOUNTER — DOCUMENTATION ONLY (OUTPATIENT)
Dept: FAMILY MEDICINE CLINIC | Age: 57
End: 2022-04-22

## 2022-04-22 RX ORDER — ATOMOXETINE 40 MG/1
40 CAPSULE ORAL DAILY
Qty: 90 CAPSULE | Refills: 0 | Status: SHIPPED | OUTPATIENT
Start: 2022-04-22 | End: 2022-06-29 | Stop reason: SDUPTHER

## 2022-05-10 DIAGNOSIS — F98.8 ATTENTION DEFICIT DISORDER (ADD) WITHOUT HYPERACTIVITY: ICD-10-CM

## 2022-05-10 RX ORDER — DEXTROAMPHETAMINE SACCHARATE, AMPHETAMINE ASPARTATE MONOHYDRATE, DEXTROAMPHETAMINE SULFATE AND AMPHETAMINE SULFATE 5; 5; 5; 5 MG/1; MG/1; MG/1; MG/1
CAPSULE, EXTENDED RELEASE ORAL
Qty: 60 CAPSULE | Refills: 0 | Status: SHIPPED | OUTPATIENT
Start: 2022-05-10 | End: 2022-05-13 | Stop reason: SDUPTHER

## 2022-05-10 NOTE — TELEPHONE ENCOUNTER
Last visit:21  Next visit:not scheduled  Previous refill 22(60+0R)    . Requested Prescriptions     Pending Prescriptions Disp Refills    amphetamine-dextroamphetamine XR (ADDERALL XR) 20 mg XR capsule 60 Capsule 0     Si caps po daily  Indications: attention deficit disorder with hyperactivity       . Please review  before prescribing new script for this medication.      Thanks,  Trisha Louis

## 2022-05-13 DIAGNOSIS — F98.8 ATTENTION DEFICIT DISORDER (ADD) WITHOUT HYPERACTIVITY: ICD-10-CM

## 2022-05-13 RX ORDER — DEXTROAMPHETAMINE SACCHARATE, AMPHETAMINE ASPARTATE MONOHYDRATE, DEXTROAMPHETAMINE SULFATE AND AMPHETAMINE SULFATE 5; 5; 5; 5 MG/1; MG/1; MG/1; MG/1
CAPSULE, EXTENDED RELEASE ORAL
Qty: 60 CAPSULE | Refills: 0 | Status: SHIPPED | OUTPATIENT
Start: 2022-05-13 | End: 2022-06-24 | Stop reason: SDUPTHER

## 2022-05-13 NOTE — TELEPHONE ENCOUNTER
Patient wife states that her  need amphetamine-dextroamphetamine XR (ADDERALL XR) 20 mg XR capsule she says the pharmacy it need to go to [facility] [phone] (f) [phone] she can be reached @ [phone]

## 2022-05-17 ENCOUNTER — TELEPHONE (OUTPATIENT)
Dept: FAMILY MEDICINE CLINIC | Age: 57
End: 2022-05-17

## 2022-05-17 NOTE — TELEPHONE ENCOUNTER
Initial prior authorization for Adderall was denied. Called insurance company to update diagnosis to ADD. Millie Bermudez is now approved from 04/17/22-05/17/23. Case # [id number]. Patients wife advised of approval. Insurance company will fax approval letter.  freya

## 2022-05-31 ENCOUNTER — TELEPHONE (OUTPATIENT)
Dept: FAMILY MEDICINE CLINIC | Age: 57
End: 2022-05-31

## 2022-05-31 NOTE — LETTER
NOTIFICATION RETURN TO WORK / SCHOOL    5/31/2022 8:05 PM    Mr. [phone]      To Whom It May Concern:    Letty Pierre is currently under the care of [facility]. He will return to work/school on: 6-6-2022 without  Restrictions. He has been ill since 5-    If there are questions or concerns please have the patient contact our office.         Sincerely,      Nathaniel Bello MD

## 2022-05-31 NOTE — TELEPHONE ENCOUNTER
Patients wife would like to speak with Earnest Gómez regarding her , she has a question to ask you.    Please give her a call @ [phone]

## 2022-05-31 NOTE — TELEPHONE ENCOUNTER
Patients wife is calling to give you a update on his condition.    Please give her a call @ [phone]

## 2022-05-31 NOTE — TELEPHONE ENCOUNTER
Spoke to pts wife - pt is taking ciprofloxacin regularly. His fever is going down, headache is improving, pt is still having diarrhea. Pt needs a letter stating he is cleared to be out of work until from 5/30/22-6/2/22, returning Friday June 3rd without restrictions. Should be addressed to Marianela Garay (phone #[phone]).

## 2022-06-01 ENCOUNTER — TELEPHONE (OUTPATIENT)
Dept: FAMILY MEDICINE CLINIC | Age: 57
End: 2022-06-01

## 2022-06-01 NOTE — TELEPHONE ENCOUNTER
Message left on patient voice mail requesting a return call regarding message. Need fax number for letter .

## 2022-06-01 NOTE — TELEPHONE ENCOUNTER
Patient's wife is calling with fax # to patient's employer. Fax #[phone]. Please call @[phone].

## 2022-06-20 RX ORDER — OLMESARTAN MEDOXOMIL AND HYDROCHLOROTHIAZIDE 20/12.5 20; 12.5 MG/1; MG/1
TABLET ORAL
Qty: 90 TABLET | Refills: 0 | Status: SHIPPED | OUTPATIENT
Start: 2022-06-20 | End: 2022-07-27 | Stop reason: SDUPTHER

## 2022-06-21 ENCOUNTER — PATIENT MESSAGE (OUTPATIENT)
Dept: FAMILY MEDICINE CLINIC | Age: 57
End: 2022-06-21

## 2022-06-21 DIAGNOSIS — F98.8 ATTENTION DEFICIT DISORDER (ADD) WITHOUT HYPERACTIVITY: ICD-10-CM

## 2022-06-23 ENCOUNTER — TELEPHONE (OUTPATIENT)
Dept: FAMILY MEDICINE CLINIC | Age: 57
End: 2022-06-23

## 2022-06-23 NOTE — TELEPHONE ENCOUNTER
Patient is calling, because he needs his:amphetamine-dextroamphetamine XR (ADDERALL XR) 20 mg XR sent to [facility]. Please call @[phone].

## 2022-06-24 NOTE — TELEPHONE ENCOUNTER
----- Message from Frankey Rinks sent at 6/23/2022  8:44 AM EDT -----  Subject: Message to Provider    QUESTIONS  Information for Provider? Dr. Breann Nielson, second attempt. Pt put a med   question into the portal for Dr. Breann Nielson 2 days ago. He gets Adderall from   him and it goes to [facility]. He needs it changed over to [facility] so   they can have enough time to begin mailing it to the pt. Please call pt   when this is done. He only has 5 days left.   ---------------------------------------------------------------------------  --------------  CALL BACK INFO  What is the best way for the office to contact you? Do not leave any   message, patient will call back for answer  Preferred Call Back Phone Number? [phone]  ---------------------------------------------------------------------------  --------------  SCRIPT ANSWERS  Relationship to Patient?  Self

## 2022-06-24 NOTE — TELEPHONE ENCOUNTER
MED REFILL SENT TO PCP FOR REVIEW. Patient also reminded that he is overdue to office visit. Last office visit was 7/30/21. Message placed on med refill for benicar and also mychart message sent to patient.

## 2022-06-27 RX ORDER — DEXTROAMPHETAMINE SACCHARATE, AMPHETAMINE ASPARTATE MONOHYDRATE, DEXTROAMPHETAMINE SULFATE AND AMPHETAMINE SULFATE 5; 5; 5; 5 MG/1; MG/1; MG/1; MG/1
CAPSULE, EXTENDED RELEASE ORAL
Qty: 60 CAPSULE | Refills: 0 | Status: SHIPPED | OUTPATIENT
Start: 2022-06-27 | End: 2022-07-27 | Stop reason: SDUPTHER

## 2022-06-29 RX ORDER — ATOMOXETINE 40 MG/1
40 CAPSULE ORAL DAILY
Qty: 90 CAPSULE | Refills: 0 | Status: SHIPPED | OUTPATIENT
Start: 2022-06-29 | End: 2022-07-27 | Stop reason: SDUPTHER

## 2022-06-29 RX ORDER — TADALAFIL 20 MG/1
20 TABLET ORAL AS NEEDED
Qty: 15 TABLET | Refills: 3 | Status: SHIPPED | OUTPATIENT
Start: 2022-06-29

## 2022-06-29 NOTE — TELEPHONE ENCOUNTER
Last visit:7/30/21  Next visit:7/27/22  Previous refill 11/15/21(5+12R)    Requested Prescriptions     Pending Prescriptions Disp Refills    tadalafiL (Cialis) 20 mg tablet 15 Tablet 3     Sig: Take 1 Tablet by mouth as needed for Erectile Dysfunction. 1/2 - 1 tab one hour before sex on an empty stomach    atomoxetine (STRATTERA) 40 mg capsule 90 Capsule 0     Sig: Take 1 Capsule by mouth daily.        For [address] [place] [address] Intervention Detail: New Rx: 2, reason: Patient Preference   Time Spent (min): 10

## 2022-07-27 ENCOUNTER — OFFICE VISIT (OUTPATIENT)
Dept: FAMILY MEDICINE CLINIC | Age: 57
End: 2022-07-27
Payer: COMMERCIAL

## 2022-07-27 VITALS
WEIGHT: 245 LBS | HEART RATE: 117 BPM | SYSTOLIC BLOOD PRESSURE: 118 MMHG | TEMPERATURE: 97.6 F | RESPIRATION RATE: 16 BRPM | OXYGEN SATURATION: 97 % | DIASTOLIC BLOOD PRESSURE: 73 MMHG | HEIGHT: 70 IN | BODY MASS INDEX: 35.07 KG/M2

## 2022-07-27 DIAGNOSIS — E66.01 SEVERE OBESITY (BMI 35.0-39.9) WITH COMORBIDITY (HCC): ICD-10-CM

## 2022-07-27 DIAGNOSIS — F98.8 ATTENTION DEFICIT DISORDER (ADD) WITHOUT HYPERACTIVITY: Primary | ICD-10-CM

## 2022-07-27 DIAGNOSIS — Z12.11 COLON CANCER SCREENING: ICD-10-CM

## 2022-07-27 PROCEDURE — 99213 OFFICE O/P EST LOW 20 MIN: CPT | Performed by: FAMILY MEDICINE

## 2022-07-27 RX ORDER — PRENATAL VIT 91/IRON/FOLIC/DHA 28-975-200
COMBINATION PACKAGE (EA) ORAL
COMMUNITY
Start: 2022-01-12

## 2022-07-27 RX ORDER — CLINDAMYCIN PHOSPHATE 10 MG/G
GEL TOPICAL
COMMUNITY
Start: 2022-01-12

## 2022-07-27 RX ORDER — ATOMOXETINE 40 MG/1
40 CAPSULE ORAL DAILY
Qty: 90 CAPSULE | Refills: 3 | Status: SHIPPED | OUTPATIENT
Start: 2022-07-27

## 2022-07-27 RX ORDER — DEXTROAMPHETAMINE SACCHARATE, AMPHETAMINE ASPARTATE MONOHYDRATE, DEXTROAMPHETAMINE SULFATE AND AMPHETAMINE SULFATE 5; 5; 5; 5 MG/1; MG/1; MG/1; MG/1
CAPSULE, EXTENDED RELEASE ORAL
Qty: 180 CAPSULE | Refills: 0 | Status: SHIPPED | OUTPATIENT
Start: 2022-07-27

## 2022-07-27 RX ORDER — ERYTHROMYCIN 5 MG/G
OINTMENT OPHTHALMIC
COMMUNITY
End: 2022-07-27 | Stop reason: ALTCHOICE

## 2022-07-27 RX ORDER — OLMESARTAN MEDOXOMIL AND HYDROCHLOROTHIAZIDE 20/12.5 20; 12.5 MG/1; MG/1
TABLET ORAL
Qty: 90 TABLET | Refills: 3 | Status: SHIPPED | OUTPATIENT
Start: 2022-07-27 | End: 2022-10-11

## 2022-07-27 RX ORDER — MELOXICAM 15 MG/1
TABLET ORAL
COMMUNITY
End: 2022-07-27 | Stop reason: ALTCHOICE

## 2022-07-27 NOTE — PROGRESS NOTES
HISTORY OF PRESENT ILLNESS  Nataly Santana is a 55 y.o. male. HPI came down with salmonella poisoning from [employer]. We eventually caled in cipro, and started feeling better. Had 2 weeks of bad diarrhea, fever, headache. All these symptoms have resolved. Still has some brain fog. Is back at work. Was out of work for 4 weeks. Otherwise feeling ok. ROS    Physical Exam  Vitals and nursing note reviewed. Constitutional:       Appearance: He is well-developed. HENT:      Right Ear: External ear normal.      Left Ear: External ear normal.   Neck:      Thyroid: No thyromegaly. Cardiovascular:      Rate and Rhythm: Normal rate and regular rhythm. Heart sounds: Normal heart sounds. Pulmonary:      Effort: Pulmonary effort is normal. No respiratory distress. Breath sounds: Normal breath sounds. No wheezing. Abdominal:      General: Bowel sounds are normal. There is no distension. Palpations: Abdomen is soft. There is no mass. Tenderness: There is no abdominal tenderness. There is no guarding. Musculoskeletal:         General: Normal range of motion. Lymphadenopathy:      Cervical: No cervical adenopathy. ASSESSMENT and PLAN  Orders Placed This Encounter    Marjim Susannedaljit [facility]    amphetamine-dextroamphetamine XR (ADDERALL XR) 20 mg XR capsule    atomoxetine (STRATTERA) 40 mg capsule    olmesartan-hydroCHLOROthiazide (BENICAR HCT) 20-12.5 mg per tablet     Diagnoses and all orders for this visit:    1. Attention deficit disorder (ADD) without hyperactivity  -     amphetamine-dextroamphetamine XR (ADDERALL XR) 20 mg XR capsule; 2 caps po daily  Indications: attention deficit disorder with hyperactivity    2. Colon cancer screening  -     REFERRAL TO GASTROENTEROLOGY    3. Severe obesity (BMI 35.0-39. 9) with comorbidity ([address]    Other orders  -     atomoxetine (STRATTERA) 40 mg capsule;  Take 1 Capsule by mouth in the morning.  - olmesartan-hydroCHLOROthiazide (BENICAR HCT) 20-12.5 mg per tablet; TAKE 1 TABLET BY MOUTH EVERY DAY FOR BLOOD PRESSURE        Will draw lab at physical when pt is fasting.

## 2022-09-20 ENCOUNTER — DOCUMENTATION ONLY (OUTPATIENT)
Dept: FAMILY MEDICINE CLINIC | Age: 57
End: 2022-09-20

## 2022-09-20 NOTE — PROGRESS NOTES
PRIOR AUTH FOR TADALAFIL 20MG APPROVED THROUGH [employer]. AUTH VALID 08/21/22-09/20/22. CASE # [id number].

## 2022-10-06 ENCOUNTER — OFFICE VISIT (OUTPATIENT)
Dept: SLEEP MEDICINE | Age: 57
End: 2022-10-06
Payer: COMMERCIAL

## 2022-10-06 VITALS
BODY MASS INDEX: 34.65 KG/M2 | WEIGHT: 242 LBS | RESPIRATION RATE: 17 BRPM | OXYGEN SATURATION: 94 % | TEMPERATURE: 98.9 F | HEIGHT: 70 IN | SYSTOLIC BLOOD PRESSURE: 112 MMHG | HEART RATE: 106 BPM | DIASTOLIC BLOOD PRESSURE: 66 MMHG

## 2022-10-06 DIAGNOSIS — E66.9 OBESITY, CLASS I, BMI 30-34.9: ICD-10-CM

## 2022-10-06 DIAGNOSIS — G47.33 OBSTRUCTIVE SLEEP APNEA (ADULT) (PEDIATRIC): Primary | ICD-10-CM

## 2022-10-06 DIAGNOSIS — I10 ESSENTIAL HYPERTENSION: ICD-10-CM

## 2022-10-06 PROCEDURE — 99213 OFFICE O/P EST LOW 20 MIN: CPT | Performed by: INTERNAL MEDICINE

## 2022-10-06 NOTE — PATIENT INSTRUCTIONS
[address] [place]. [place], [address]  Tel.  [phone]  Fax. [address]  [place], [address]  Tel.  [phone]  Fax. [phone] [address], [place]   Tel.  [phone]  Fax. [phone]     PROPER SLEEP HYGIENE    What to avoid  Do not have drinks with caffeine, such as coffee or black tea, for 8 hours before bed. Do not smoke or use other types of tobacco near bedtime. Nicotine is a stimulant and can keep you awake. Avoid drinking alcohol late in the evening, because it can cause you to wake in the middle of the night. Do not eat a big meal close to bedtime. If you are hungry, eat a light snack. Do not drink a lot of water close to bedtime, because the need to urinate may wake you up during the night. Do not read or watch TV in bed. Use the bed only for sleeping and sexual activity. What to try  Go to bed at the same time every night, and wake up at the same time every morning. Do not take naps during the day. Keep your bedroom quiet, dark, and cool. Get regular exercise, but not within 3 to 4 hours of your bedtime. .  Sleep on a comfortable pillow and mattress. If watching the clock makes you anxious, turn it facing away from you so you cannot see the time. If you worry when you lie down, start a worry book. Well before bedtime, write down your worries, and then set the book and your concerns aside. Try meditation or other relaxation techniques before you go to bed. If you cannot fall asleep, get up and go to another room until you feel sleepy. Do something relaxing. Repeat your bedtime routine before you go to bed again. Make your house quiet and calm about an hour before bedtime. Turn down the lights, turn off the TV, log off the computer, and turn down the volume on music. This can help you relax after a busy day.     Drowsy Driving  The [address] [place] [address] Road Traffic Safety Administration cites drowsiness as a causing factor in more than 434,446 police reported crashes annually, resulting in 76,000 injuries and 1,500 deaths. Other surveys suggest 55% of people polled have driven while drowsy in the past year, 23% had fallen asleep but not crashed, 3% crashed, and 2% had and accident due to drowsy driving. Who is at risk? Young Drivers: One study of drowsy driving accidents states that 55% of the drivers were under 25 years. Of those, 75% were male. Shift Workers and Travelers: People who work overnight or travel across time zones frequently are at higher risk of experiencing Circadian Rhythm Disorders. They are trying to work and function when their body is programed to sleep. Sleep Deprived: Lack of sleep has a serious impact on your ability to pay attention or focus on a task. Consistently getting less than the average of 8 hours your body needs creates partial or cumulative sleep deprivation. Untreated Sleep Disorders: Sleep Apnea, Narcolepsy, R.L.S., and other sleep disorders (untreated) prevent a person from getting enough restful sleep. This leads to excessive daytime sleepiness and increases the risk for drowsy driving accidents by up to 7 times. Medications / Alcohol: Even over the counter medications can cause drowsiness. Medications that impair a drivers attention should have a warning label. Alcohol naturally makes you sleepy and on its own can cause accidents. Combined with excessive drowsiness its effects are amplified. Signs of Drowsy Driving:   * You don't remember driving the last few miles   * You may drift out of your nagi   * You are unable to focus and your thoughts wander   * You may yawn more often than normal   * You have difficulty keeping your eyes open / nodding off   * Missing traffic signs, speeding, or tailgating  Prevention-   Good sleep hygiene, lifestyle and behavioral choices have the most impact on drowsy driving.  There is no substitute for sleep and the average person requires 8 hours nightly. If you find yourself driving drowsy, stop and sleep. Consider the sleep hygiene tips provided during your visit as well. Medication Refill Policy: Refills for all medications require 1 week advance notice. Please have your pharmacy fax a refill request. We are unable to fax, or call in \"controled substance\" medications and you will need to pick these prescriptions up from our office. Soapbox Activation    Thank you for requesting access to So[employer]box. Please follow the instructions below to securely access and download your online medical record. Soapbox allows you to send messages to your doctor, view your test results, renew your prescriptions, schedule appointments, and more. How Do I Sign Up? In your internet browser, go to [email or web address]. Click on the First Time User? Click Here link in the Sign In box. You will see the New Member Sign Up page. Enter your Soapbox Access Code exactly as it appears below. You will not need to use this code after youve completed the sign-up process. If you do not sign up before the expiration date, you must request a new code. Soapbox Access Code: Activation code not generated  Current Soapbox Status: Active (This is the date your Soapbox access code will )    Enter the last four digits of your Social Security Number (xxxx) and Date of Birth (mm/dd/yyyy) as indicated and click Submit. You will be taken to the next sign-up page. Create a Soapbox ID. This will be your Soapbox login ID and cannot be changed, so think of one that is secure and easy to remember. Create a Soapbox password. You can change your password at any time. Enter your Password Reset Question and Answer. This can be used at a later time if you forget your password. Enter your e-mail address. You will receive e-mail notification when new information is available in [address] Click Sign Up.  You can now view and download portions of your medical record. Click the [facility] [facility] link to download a portable copy of your medical information. Additional Information    If you have questions, please call [phone]. Remember, [employer] is NOT to be used for urgent needs. For medical emergencies, dial 911.

## 2022-10-06 NOTE — PROGRESS NOTES
[address] [facility] [facility]. [place], [address]  Tel.  [phone]  Fax. [address] [facility] [address]  [place], [address] [facility]  Tel.  [phone]  Fax. [phone] [address] [facility] [place]   Tel.  [phone]  Fax. [phone]     S>Michele Zhang. is a 65 y.o. male seen for a positive airway pressure follow-up. He reports no problems using the device. The following problems are identified:    Drowsiness no Problems exhaling no   Snoring no Forget to put on no   Mask Comfortable yes Can't fall asleep no   Dry Mouth no Mask falls off no   Air Leaking no Frequent awakenings no   Download reviewed. He admits that his sleep has improved. Therapy Apnea Index averaged over PAP use: 3 /hr which reflects improved sleep breathing condition. No Known Allergies    He has a current medication list which includes the following prescription(s): clindamycin, terbinafine hcl, amphetamine-dextroamphetamine xr, atomoxetine, olmesartan-hydrochlorothiazide, tadalafil, and unithroid. Radha Jimenez He  has a past medical history of Chronic back pain, Hypertension, Sleep apnea, and Thyroid disease. [place] Sleepiness Score: 6   and Modified F.O.S.Q. Score Total / 2: 15.5   which reflect improved sleep quality over therapy time. O>    Visit Vitals  /66 (BP 1 Location: Right upper arm, BP Patient Position: Sitting, BP Cuff Size: Adult long)   Pulse (!) 106   Temp 98.9 °F (37.2 °C) (Temporal)   Resp 17   Ht 5' 10\" (1.778 m)   Wt 242 lb (109.8 kg)   SpO2 94%   BMI 34.72 kg/m²           General:   Alert, oriented, not in distress   Neck:   No JVD    Chest/Lungs:  symetrical lung expansion , no accessory muscle use    Extremities:  no obvious rashes , negative edema    Neuro:  No focal deficits ; No obvious tremor    Psych:  Normal affect ,  Normal countenance ;         A>    ICD-10-CM ICD-9-CM    1.  Obstructive sleep apnea (adult) (pediatric)  G47.33 327.23 AMB SUPPLY ORDER 2. Essential hypertension  I10 401.9       3. Obesity, Class I, BMI 30-34.9  E66.9 278.00         AHI = 39(2017). On CPAP, Resmed :  7-15 cmH2O. Compliant:      yes    Therapeutic Response:  Positive    P>      *   Follow-up and Dispositions    Return in about 3 months (around 1/6/2023). he is compliant with PAP therapy and PAP continues to benefit patient and remains necessary for control of his sleep apnea. Patient's PAP device has exceeded its  Lifespan. Replacement device ordered. he will continue on his current pressure settings. * He was asked to contact our office for any problems regarding PAP therapy. * Counseling was provided regarding the importance of regular PAP use and on proper sleep hygiene and safe driving. * Re-enforced proper and regular cleaning for the device. 2. Hypertension - controlled on current regimen. he will continue his current regimen. he will continue to monitor at home and with his PMD for further adjustments as needed. I have reviewed the relationship between hypertension as it relates to sleep-disordered breathing. 3. Obesity - weight has been going down slowly. I have discussed the relationship of weight to obstructive sleep apnea. I have advised him to continue efforts with his weight loss plan  he understands that weight loss can reduce severity of sleep apnea and snoring.      Electronically signed by    Sonam Hester MD  Diplomate in Sleep Medicine  [facility]  10/6/2022

## 2022-10-11 RX ORDER — OLMESARTAN MEDOXOMIL AND HYDROCHLOROTHIAZIDE 20/12.5 20; 12.5 MG/1; MG/1
TABLET ORAL
Qty: 90 TABLET | Refills: 3 | Status: SHIPPED | OUTPATIENT
Start: 2022-10-11

## 2022-10-12 ENCOUNTER — DOCUMENTATION ONLY (OUTPATIENT)
Dept: SLEEP MEDICINE | Age: 57
End: 2022-10-12

## 2022-11-04 DIAGNOSIS — F98.8 ATTENTION DEFICIT DISORDER (ADD) WITHOUT HYPERACTIVITY: ICD-10-CM

## 2022-11-07 RX ORDER — DEXTROAMPHETAMINE SACCHARATE, AMPHETAMINE ASPARTATE MONOHYDRATE, DEXTROAMPHETAMINE SULFATE AND AMPHETAMINE SULFATE 5; 5; 5; 5 MG/1; MG/1; MG/1; MG/1
CAPSULE, EXTENDED RELEASE ORAL
Qty: 180 CAPSULE | Refills: 0 | Status: SHIPPED | OUTPATIENT
Start: 2022-11-07

## 2022-12-09 ENCOUNTER — DOCUMENTATION ONLY (OUTPATIENT)
Dept: SLEEP MEDICINE | Age: 57
End: 2022-12-09

## 2022-12-10 DIAGNOSIS — R11.0: Primary | ICD-10-CM

## 2022-12-10 DIAGNOSIS — T50.Z95A: Primary | ICD-10-CM

## 2022-12-10 RX ORDER — ONDANSETRON 4 MG/1
4 TABLET, ORALLY DISINTEGRATING ORAL
Qty: 20 TABLET | Refills: 0 | Status: SHIPPED | OUTPATIENT
Start: 2022-12-10

## 2023-01-16 RX ORDER — ATOMOXETINE 40 MG/1
CAPSULE ORAL
Qty: 90 CAPSULE | Refills: 3 | Status: SHIPPED | OUTPATIENT
Start: 2023-01-16

## 2023-01-31 NOTE — TELEPHONE ENCOUNTER
Natali Aburto is requesting a 90 day supply  Previous Rx's were sent to [facility]    Last Visit: 7/27/22 with MD Mitch Barba  Next Appointment: 3/8/23 with MD Mitch Barba    Requested Prescriptions     Pending Prescriptions Disp Refills    olmesartan-hydroCHLOROthiazide (BENICAR HCT) 20-12.5 mg per tablet 90 Tablet 3     Sig: Take 1 Tablet by mouth daily. Indications: blood pressure    atomoxetine (STRATTERA) 40 mg capsule 90 Capsule 3     Sig: Take 1 Capsule by mouth daily. For Pharmacy Admin Tracking Only    Program: Medication Refill  CPA in place:   Recommendation Provided To:    Intervention Detail: New Rx: 2, reason: Patient Preference  Intervention Accepted By:   Pedro Lucero Closed?:   Time Spent (min): 5

## 2023-02-01 RX ORDER — OLMESARTAN MEDOXOMIL AND HYDROCHLOROTHIAZIDE 20/12.5 20; 12.5 MG/1; MG/1
1 TABLET ORAL DAILY
Qty: 90 TABLET | Refills: 3 | Status: SHIPPED | OUTPATIENT
Start: 2023-02-01

## 2023-02-01 RX ORDER — ATOMOXETINE 40 MG/1
40 CAPSULE ORAL DAILY
Qty: 90 CAPSULE | Refills: 3 | Status: SHIPPED | OUTPATIENT
Start: 2023-02-01

## 2023-02-21 ENCOUNTER — TELEPHONE (OUTPATIENT)
Dept: FAMILY MEDICINE CLINIC | Age: 58
End: 2023-02-21

## 2023-03-08 ENCOUNTER — OFFICE VISIT (OUTPATIENT)
Dept: FAMILY MEDICINE CLINIC | Age: 58
End: 2023-03-08
Payer: COMMERCIAL

## 2023-03-08 VITALS
WEIGHT: 245 LBS | OXYGEN SATURATION: 99 % | RESPIRATION RATE: 16 BRPM | BODY MASS INDEX: 35.07 KG/M2 | HEIGHT: 70 IN | HEART RATE: 89 BPM | TEMPERATURE: 98.4 F | DIASTOLIC BLOOD PRESSURE: 79 MMHG | SYSTOLIC BLOOD PRESSURE: 112 MMHG

## 2023-03-08 DIAGNOSIS — F98.8 ATTENTION DEFICIT DISORDER (ADD) WITHOUT HYPERACTIVITY: ICD-10-CM

## 2023-03-08 DIAGNOSIS — Z00.00 ANNUAL PHYSICAL EXAM: ICD-10-CM

## 2023-03-08 DIAGNOSIS — E03.9 ACQUIRED HYPOTHYROIDISM: ICD-10-CM

## 2023-03-08 DIAGNOSIS — E78.00 HYPERCHOLESTEROLEMIA: ICD-10-CM

## 2023-03-08 DIAGNOSIS — I10 ESSENTIAL HYPERTENSION: Primary | ICD-10-CM

## 2023-03-08 DIAGNOSIS — E66.01 SEVERE OBESITY (BMI 35.0-39.9) WITH COMORBIDITY (HCC): ICD-10-CM

## 2023-03-08 DIAGNOSIS — G89.29 CHRONIC PAIN OF LEFT KNEE: ICD-10-CM

## 2023-03-08 DIAGNOSIS — M25.562 CHRONIC PAIN OF LEFT KNEE: ICD-10-CM

## 2023-03-08 PROCEDURE — 3078F DIAST BP <80 MM HG: CPT | Performed by: FAMILY MEDICINE

## 2023-03-08 PROCEDURE — 99396 PREV VISIT EST AGE 40-64: CPT | Performed by: FAMILY MEDICINE

## 2023-03-08 PROCEDURE — 3074F SYST BP LT 130 MM HG: CPT | Performed by: FAMILY MEDICINE

## 2023-03-08 RX ORDER — DEXTROAMPHETAMINE SACCHARATE, AMPHETAMINE ASPARTATE MONOHYDRATE, DEXTROAMPHETAMINE SULFATE AND AMPHETAMINE SULFATE 5; 5; 5; 5 MG/1; MG/1; MG/1; MG/1
CAPSULE, EXTENDED RELEASE ORAL
Qty: 180 CAPSULE | Refills: 0 | Status: SHIPPED | OUTPATIENT
Start: 2023-03-08

## 2023-03-08 NOTE — PROGRESS NOTES
Chief Complaint   Patient presents with    Complete Physical       1. \"Have you been to the ER, urgent care clinic since your last visit? Hospitalized since your last visit? \" No    2. \"Have you seen or consulted any other health care providers outside of the [address] [place] [address] since your last visit? \" No     3. For patients aged 39-70: Has the patient had a colonoscopy / FIT/ Cologuard? No      If the patient is female:    4. For patients aged 41-77: Has the patient had a mammogram within the past 2 years? NA - based on age or sex      11. For patients aged 21-65: Has the patient had a pap smear?  NA - based on age or sex    Health Maintenance Due   Topic Date Due    Hepatitis C Screening  Never done    Colorectal Cancer Screening Combo  Never done    Shingles Vaccine (1 of 2) Never done    Depression Screen  07/30/2022    Flu Vaccine (1) 08/01/2022    DTaP/Tdap/Td series (2 - Td or Tdap) 09/26/2022

## 2023-03-08 NOTE — PROGRESS NOTES
HISTORY OF PRESENT ILLNESS  Judy Hassan is a 65 y.o. male. HPI in for physical. Some pain in L knee for a month now. It also pops and clicks at times. When knee pops it feels better. Not taking any pain meds for it. Has had arthroscopic surgery on this knee. Has difficulty getting a ride for a colonoscopy. Insurance doesn't cover cologuard. Review of Systems   Constitutional:  Negative for malaise/fatigue and weight loss. HENT:  Negative for hearing loss and tinnitus. Eyes:  Negative for blurred vision and double vision. Respiratory:  Negative for shortness of breath. Nonsmoker   Cardiovascular:  Negative for chest pain and leg swelling. Gastrointestinal:  Negative for abdominal pain and blood in stool. Genitourinary:  Negative for hematuria and urgency. Skin:  Negative for itching and rash. Neurological:  Negative for loss of consciousness and weakness. Psychiatric/Behavioral:  Negative for depression. The patient does not have insomnia. Physical Exam  Vitals and nursing note reviewed. Constitutional:       Appearance: He is well-developed. HENT:      Right Ear: External ear normal.      Left Ear: External ear normal.   Neck:      Thyroid: No thyromegaly. Cardiovascular:      Rate and Rhythm: Normal rate and regular rhythm. Heart sounds: Normal heart sounds. Pulmonary:      Effort: Pulmonary effort is normal. No respiratory distress. Breath sounds: Normal breath sounds. No wheezing. Abdominal:      General: Bowel sounds are normal. There is no distension. Palpations: Abdomen is soft. There is no mass. Tenderness: There is no abdominal tenderness. There is no guarding. Musculoskeletal:         General: Normal range of motion. Comments: L knee- mild crepitus. No effusion   Lymphadenopathy:      Cervical: No cervical adenopathy.        ASSESSMENT and PLAN  Orders Placed This Encounter    XR KNEE LT MAX 2 VWS    CBC WITH AUTOMATED DIFF LIPID PANEL    PROSTATE SPECIFIC AG    METABOLIC PANEL, COMPREHENSIVE    URINALYSIS W/MICROSCOPIC    TSH 3RD GENERATION    T4, FREE    amphetamine-dextroamphetamine XR (ADDERALL XR) 20 mg XR capsule     Diagnoses and all orders for this visit:    1. Essential hypertension  -     CBC WITH AUTOMATED DIFF; Future  -     METABOLIC PANEL, COMPREHENSIVE; Future    2. Hypercholesterolemia  -     LIPID PANEL; Future  -     URINALYSIS W/MICROSCOPIC; Future    3. Acquired hypothyroidism  -     TSH 3RD GENERATION; Future  -     T4, FREE; Future    4. Annual physical exam  -     PSA, DIAGNOSTIC (PROSTATE SPECIFIC AG); Future    5. Severe obesity (BMI 35.0-39. 9) with comorbidity ([facility] [address]    6. Chronic pain of left knee  -     XR KNEE LT MAX 2 VWS; Future    7.  Attention deficit disorder (ADD) without hyperactivity  -     amphetamine-dextroamphetamine XR (ADDERALL XR) 20 mg XR capsule; 2 caps po daily  Indications: attention deficit disorder with hyperactivity

## 2023-03-09 ENCOUNTER — TELEPHONE (OUTPATIENT)
Dept: FAMILY MEDICINE CLINIC | Age: 58
End: 2023-03-09

## 2023-03-09 ENCOUNTER — PATIENT MESSAGE (OUTPATIENT)
Dept: FAMILY MEDICINE CLINIC | Age: 58
End: 2023-03-09

## 2023-03-09 DIAGNOSIS — F98.8 ATTENTION DEFICIT DISORDER (ADD) WITHOUT HYPERACTIVITY: ICD-10-CM

## 2023-03-09 LAB
ALBUMIN SERPL-MCNC: 4.4 G/DL (ref 3.8–4.9)
ALBUMIN/GLOB SERPL: 1.6 {RATIO} (ref 1.2–2.2)
ALP SERPL-CCNC: 67 IU/L (ref 44–121)
ALT SERPL-CCNC: 17 IU/L (ref 0–44)
APPEARANCE UR: CLEAR
AST SERPL-CCNC: 20 IU/L (ref 0–40)
BACTERIA #/AREA URNS HPF: NORMAL /[HPF]
BASOPHILS # BLD AUTO: 0 X10E3/UL (ref 0–0.2)
BASOPHILS NFR BLD AUTO: 1 %
BILIRUB SERPL-MCNC: 0.5 MG/DL (ref 0–1.2)
BILIRUB UR QL STRIP: NEGATIVE
BUN SERPL-MCNC: 16 MG/DL (ref 6–24)
BUN/CREAT SERPL: 15 (ref 9–20)
CALCIUM SERPL-MCNC: 9.8 MG/DL (ref 8.7–10.2)
CASTS URNS QL MICRO: NORMAL /LPF
CHLORIDE SERPL-SCNC: 98 MMOL/L (ref 96–106)
CHOLEST SERPL-MCNC: 237 MG/DL (ref 100–199)
CO2 SERPL-SCNC: 25 MMOL/L (ref 20–29)
COLOR UR: YELLOW
CREAT SERPL-MCNC: 1.06 MG/DL (ref 0.76–1.27)
EGFRCR SERPLBLD CKD-EPI 2021: 82 ML/MIN/1.73
EOSINOPHIL # BLD AUTO: 0.1 X10E3/UL (ref 0–0.4)
EOSINOPHIL NFR BLD AUTO: 3 %
EPI CELLS #/AREA URNS HPF: NORMAL /HPF (ref 0–10)
ERYTHROCYTE [DISTWIDTH] IN BLOOD BY AUTOMATED COUNT: 14 % (ref 11.6–15.4)
GLOBULIN SER CALC-MCNC: 2.7 G/DL (ref 1.5–4.5)
GLUCOSE SERPL-MCNC: 100 MG/DL (ref 70–99)
GLUCOSE UR QL STRIP: NEGATIVE
HCT VFR BLD AUTO: 45.2 % (ref 37.5–51)
HDLC SERPL-MCNC: 38 MG/DL
HGB BLD-MCNC: 15.1 G/DL (ref 13–17.7)
HGB UR QL STRIP: NEGATIVE
IMM GRANULOCYTES # BLD AUTO: 0 X10E3/UL (ref 0–0.1)
IMM GRANULOCYTES NFR BLD AUTO: 0 %
IMP & REVIEW OF LAB RESULTS: NORMAL
KETONES UR QL STRIP: NEGATIVE
LDLC SERPL CALC-MCNC: 145 MG/DL (ref 0–99)
LEUKOCYTE ESTERASE UR QL STRIP: NEGATIVE
LYMPHOCYTES # BLD AUTO: 1.9 X10E3/UL (ref 0.7–3.1)
LYMPHOCYTES NFR BLD AUTO: 38 %
MCH RBC QN AUTO: 29.4 PG (ref 26.6–33)
MCHC RBC AUTO-ENTMCNC: 33.4 G/DL (ref 31.5–35.7)
MCV RBC AUTO: 88 FL (ref 79–97)
MICRO URNS: NORMAL
MICRO URNS: NORMAL
MONOCYTES # BLD AUTO: 0.3 X10E3/UL (ref 0.1–0.9)
MONOCYTES NFR BLD AUTO: 7 %
NEUTROPHILS # BLD AUTO: 2.4 X10E3/UL (ref 1.4–7)
NEUTROPHILS NFR BLD AUTO: 51 %
NITRITE UR QL STRIP: NEGATIVE
PH UR STRIP: 6 [PH] (ref 5–7.5)
PLATELET # BLD AUTO: 195 X10E3/UL (ref 150–450)
POTASSIUM SERPL-SCNC: 4.2 MMOL/L (ref 3.5–5.2)
PROT SERPL-MCNC: 7.1 G/DL (ref 6–8.5)
PROT UR QL STRIP: NEGATIVE
PSA SERPL-MCNC: 1.7 NG/ML (ref 0–4)
RBC # BLD AUTO: 5.13 X10E6/UL (ref 4.14–5.8)
RBC #/AREA URNS HPF: NORMAL /HPF (ref 0–2)
SODIUM SERPL-SCNC: 138 MMOL/L (ref 134–144)
SP GR UR STRIP: 1.02 (ref 1–1.03)
T4 FREE SERPL-MCNC: 1.35 NG/DL (ref 0.82–1.77)
TRIGL SERPL-MCNC: 292 MG/DL (ref 0–149)
TSH SERPL DL<=0.005 MIU/L-ACNC: 2.79 UIU/ML (ref 0.45–4.5)
UROBILINOGEN UR STRIP-MCNC: 0.2 MG/DL (ref 0.2–1)
VLDLC SERPL CALC-MCNC: 54 MG/DL (ref 5–40)
WBC # BLD AUTO: 4.8 X10E3/UL (ref 3.4–10.8)
WBC #/AREA URNS HPF: NORMAL /HPF (ref 0–5)

## 2023-03-09 RX ORDER — DEXTROAMPHETAMINE SACCHARATE, AMPHETAMINE ASPARTATE, DEXTROAMPHETAMINE SULFATE AND AMPHETAMINE SULFATE 7.5; 7.5; 7.5; 7.5 MG/1; MG/1; MG/1; MG/1
TABLET ORAL
Qty: 30 TABLET | Refills: 0 | Status: SHIPPED | OUTPATIENT
Start: 2023-03-09

## 2023-03-09 RX ORDER — DEXTROAMPHETAMINE SACCHARATE, AMPHETAMINE ASPARTATE, DEXTROAMPHETAMINE SULFATE AND AMPHETAMINE SULFATE 2.5; 2.5; 2.5; 2.5 MG/1; MG/1; MG/1; MG/1
TABLET ORAL
Qty: 30 TABLET | Refills: 0 | Status: SHIPPED | OUTPATIENT
Start: 2023-03-09

## 2023-03-09 RX ORDER — DEXTROAMPHETAMINE SACCHARATE, AMPHETAMINE ASPARTATE, DEXTROAMPHETAMINE SULFATE AND AMPHETAMINE SULFATE 2.5; 2.5; 2.5; 2.5 MG/1; MG/1; MG/1; MG/1
10 TABLET ORAL 2 TIMES DAILY
Qty: 60 TABLET | Refills: 0 | Status: SHIPPED | OUTPATIENT
Start: 2023-03-09 | End: 2023-03-09 | Stop reason: CLARIF

## 2023-03-09 NOTE — TELEPHONE ENCOUNTER
Pancho Gaxiola     I have printed your message for Dr. Barbie Mendenhall to review once he's done with patients.       Karina Lynch

## 2023-03-09 NOTE — TELEPHONE ENCOUNTER
Per pharmacy, Amphet ER 20mg capsules are on backorder. I contacted the pharmacy and confirmed they have no other XR strengths in stock. The only available strength is Amphet/Dextro 30mg. Please advise          For Pharmacy Admin Tracking Only    Program: Medication Refill  CPA in place:   Recommendation Provided To:    Intervention Detail: New Rx: 1, reason: Needs Additional Therapy  Intervention Accepted By:   Gap Closed?:   Time Spent (min): 5

## 2023-03-09 NOTE — TELEPHONE ENCOUNTER
From: Bk Monreal. To: Dayanna Saavedra MD  Sent: 3/9/2023 3:53 PM EST  Subject: change in pharmacy    Dr. Eliseo Flores, I normally take 40mg Adderall ext release in morning every day. [facility] found out [facility] have [facility] regular in-house. I would like to switch to:  [facility]. [address] [address]  Pharmacist: Luca Yoder  [phone]  They have regular Adderall:  30 mg and  10mg    Yoni, pharmacist, says the pills are scored. I can take 1/2 of the 30mg = 15 and 1/2 of the 10mg = 5 for a total of 20mg reg in morning. Then at dinner, do the same 15 + 5 = 20 mg at dinner. On a daily basis, I would be taking a total of 40mg daily; however, not in an extended release form. Please advise.   Cyndy Mendoza  [phone]

## 2023-03-09 NOTE — TELEPHONE ENCOUNTER
Patients wife, Kaur Rodgers called stating the patient actually needs to take a total of 40mg for rx for dextroamphetamine-amphetamine (ADDERALL) 10 mg tablet   And not a total of 20mg. She can be reached at [phone].

## 2023-04-19 DIAGNOSIS — F98.8 ATTENTION DEFICIT DISORDER (ADD) WITHOUT HYPERACTIVITY: ICD-10-CM

## 2023-04-19 RX ORDER — ATOMOXETINE 40 MG/1
40 CAPSULE ORAL DAILY
Qty: 90 CAPSULE | Refills: 3 | Status: CANCELLED | OUTPATIENT
Start: 2023-04-19

## 2023-04-20 RX ORDER — DEXTROAMPHETAMINE SACCHARATE, AMPHETAMINE ASPARTATE, DEXTROAMPHETAMINE SULFATE AND AMPHETAMINE SULFATE 7.5; 7.5; 7.5; 7.5 MG/1; MG/1; MG/1; MG/1
TABLET ORAL
Qty: 30 TABLET | Refills: 0 | Status: SHIPPED | OUTPATIENT
Start: 2023-04-20

## 2023-04-20 NOTE — TELEPHONE ENCOUNTER
Strattera was sent on 23 for #90 with 3 refills. Last Visit: 3/8/23 with MD Rosalee Gandhi  Next Appointment: 23 with MD Rosalee Gandhi  Previous Refill Encounter(s): 3/9/23    Requested Prescriptions     Pending Prescriptions Disp Refills    dextroamphetamine-amphetamine (ADDERALL) 30 mg tablet 30 Tablet 0     Si/2 tab po bid. Take with 1/2 of a 10 mg tablet for a total of 20 mg bid. For Pharmacy Admin Tracking Only    Program: Medication Refill  CPA in place:   Recommendation Provided To:    Intervention Detail: New Rx: 1, reason: Patient Preference  Intervention Accepted By:   Esthela Lara Closed?:   Time Spent (min): 5

## 2023-06-08 ENCOUNTER — OFFICE VISIT (OUTPATIENT)
Age: 58
End: 2023-06-08
Payer: COMMERCIAL

## 2023-06-08 VITALS
DIASTOLIC BLOOD PRESSURE: 81 MMHG | BODY MASS INDEX: 35.07 KG/M2 | TEMPERATURE: 97.8 F | WEIGHT: 245 LBS | HEART RATE: 88 BPM | OXYGEN SATURATION: 98 % | RESPIRATION RATE: 16 BRPM | SYSTOLIC BLOOD PRESSURE: 123 MMHG | HEIGHT: 70 IN

## 2023-06-08 DIAGNOSIS — F90.2 ATTENTION DEFICIT HYPERACTIVITY DISORDER (ADHD), COMBINED TYPE: Primary | ICD-10-CM

## 2023-06-08 DIAGNOSIS — J40 BRONCHITIS: ICD-10-CM

## 2023-06-08 PROCEDURE — 99213 OFFICE O/P EST LOW 20 MIN: CPT | Performed by: FAMILY MEDICINE

## 2023-06-08 PROCEDURE — 3079F DIAST BP 80-89 MM HG: CPT | Performed by: FAMILY MEDICINE

## 2023-06-08 PROCEDURE — 3074F SYST BP LT 130 MM HG: CPT | Performed by: FAMILY MEDICINE

## 2023-06-08 RX ORDER — DEXTROAMPHETAMINE SACCHARATE, AMPHETAMINE ASPARTATE, DEXTROAMPHETAMINE SULFATE AND AMPHETAMINE SULFATE 5; 5; 5; 5 MG/1; MG/1; MG/1; MG/1
20 TABLET ORAL 2 TIMES DAILY
Qty: 60 TABLET | Refills: 0 | Status: SHIPPED | OUTPATIENT
Start: 2023-06-08 | End: 2023-07-08

## 2023-06-08 RX ORDER — DEXTROMETHORPHAN HYDROBROMIDE AND PROMETHAZINE HYDROCHLORIDE 15; 6.25 MG/5ML; MG/5ML
5 SYRUP ORAL 4 TIMES DAILY PRN
Qty: 120 ML | Refills: 0 | Status: SHIPPED | OUTPATIENT
Start: 2023-06-08 | End: 2023-06-15

## 2023-06-08 RX ORDER — AZITHROMYCIN 250 MG/1
250 TABLET, FILM COATED ORAL SEE ADMIN INSTRUCTIONS
Qty: 6 TABLET | Refills: 0 | Status: SHIPPED | OUTPATIENT
Start: 2023-06-08 | End: 2023-06-13

## 2023-06-08 SDOH — ECONOMIC STABILITY: FOOD INSECURITY: WITHIN THE PAST 12 MONTHS, YOU WORRIED THAT YOUR FOOD WOULD RUN OUT BEFORE YOU GOT MONEY TO BUY MORE.: NEVER TRUE

## 2023-06-08 SDOH — ECONOMIC STABILITY: INCOME INSECURITY: HOW HARD IS IT FOR YOU TO PAY FOR THE VERY BASICS LIKE FOOD, HOUSING, MEDICAL CARE, AND HEATING?: NOT HARD AT ALL

## 2023-06-08 SDOH — ECONOMIC STABILITY: FOOD INSECURITY: WITHIN THE PAST 12 MONTHS, THE FOOD YOU BOUGHT JUST DIDN'T LAST AND YOU DIDN'T HAVE MONEY TO GET MORE.: NEVER TRUE

## 2023-06-08 ASSESSMENT — PATIENT HEALTH QUESTIONNAIRE - PHQ9
1. LITTLE INTEREST OR PLEASURE IN DOING THINGS: 0
SUM OF ALL RESPONSES TO PHQ QUESTIONS 1-9: 0

## 2023-06-08 NOTE — PROGRESS NOTES
Chief Complaint   Patient presents with    Medication Refill         1. \"Have you been to the ER, urgent care clinic since your last visit? Hospitalized since your last visit? \" no    2. \"Have you seen or consulted any other health care providers outside of the [address] [place] [address] since your last visit? \" [facility] - Dr. Dao Millan      3. For patients aged 39-70: Has the patient had a colonoscopy / FIT/ Cologuard? NO      If the patient is female:    4. For patients aged 41-77: Has the patient had a mammogram within the past 2 years? N/a      5. For patients aged 21-65: Has the patient had a pap smear?  N/a      Health Maintenance Due   Topic Date Due    HIV screen  Never done    Hepatitis C screen  Never done    Colorectal Cancer Screen  Never done    Shingles vaccine (1 of 2) Never done    Depression Screen  07/30/2022    COVID-19 Vaccine (5 - Booster for Wang series) 08/20/2022    DTaP/Tdap/Td vaccine (2 - Td or Tdap) 09/26/2022

## 2023-06-08 NOTE — PROGRESS NOTES
Jaspal De La Rosa (:  1965) is a 65 y.o. male,Established patient, here for evaluation of the following chief complaint(s):  Medication Refill         ASSESSMENT/PLAN:  1. Attention deficit hyperactivity disorder (ADHD), combined type  -     amphetamine-dextroamphetamine (ADDERALL, 20MG,) 20 MG tablet; Take 1 tablet by mouth 2 times daily for 30 days. Max Daily Amount: 40 mg, Disp-60 tablet, R-0Normal  2. Bronchitis      Return in about 6 months (around 2023). Subjective   SUBJECTIVE/OBJECTIVE:  HPIwas having some low back pain for a few months, radiated down L leg. Had xrays taken, was given a medrol dose pack. Taking PT. Doing somewhat better, but still having some pain. Hard to lie on back at night. 3- days ago developed a respiratory infection- sneezing, cough. Has been taking robitussin. No fever, chills. Cough is productive of greenish sputum at times. Review of Systems       Objective   Physical Exam  Cardiovascular:      Rate and Rhythm: Normal rate and regular rhythm. Heart sounds: Normal heart sounds. No murmur heard. Pulmonary:      Effort: Pulmonary effort is normal.      Breath sounds: Normal breath sounds. Abdominal:      General: Abdomen is flat. Bowel sounds are normal.      Palpations: Abdomen is soft. Musculoskeletal:      Right lower leg: No edema. Left lower leg: No edema. An electronic signature was used to authenticate this note.     --Tricia Scherer MD

## 2023-08-22 DIAGNOSIS — F90.2 ATTENTION DEFICIT HYPERACTIVITY DISORDER (ADHD), COMBINED TYPE: ICD-10-CM

## 2023-08-22 RX ORDER — DEXTROAMPHETAMINE SACCHARATE, AMPHETAMINE ASPARTATE, DEXTROAMPHETAMINE SULFATE AND AMPHETAMINE SULFATE 5; 5; 5; 5 MG/1; MG/1; MG/1; MG/1
20 TABLET ORAL 2 TIMES DAILY
Qty: 60 TABLET | Refills: 0 | Status: SHIPPED | OUTPATIENT
Start: 2023-08-22 | End: 2023-09-21

## 2023-08-22 NOTE — TELEPHONE ENCOUNTER
Patient requesting refill of adderall      [id number]  [address]     Best call back: [phone]

## 2023-10-06 DIAGNOSIS — F90.2 ATTENTION DEFICIT HYPERACTIVITY DISORDER (ADHD), COMBINED TYPE: ICD-10-CM

## 2023-10-06 NOTE — TELEPHONE ENCOUNTER
Last appointment: 6/8/23  Next appointment: 12/7/23  Previous refill encounter(s): 8/22/23 #60    Requested Prescriptions     Pending Prescriptions Disp Refills    amphetamine-dextroamphetamine (ADDERALL, 20MG,) 20 MG tablet 60 tablet 0     Sig: Take 1 tablet by mouth 2 times daily for 30 days. Max Daily Amount: 40 mg         For Pharmacy Admin Tracking Only    Program: Medication Refill  CPA in place:    Recommendation Provided To:    Intervention Detail: New Rx: 1, reason: Patient Preference  Intervention Accepted By:   Tiffanie Nunez Closed?:    Time Spent (min): 5

## 2023-10-08 RX ORDER — DEXTROAMPHETAMINE SACCHARATE, AMPHETAMINE ASPARTATE, DEXTROAMPHETAMINE SULFATE AND AMPHETAMINE SULFATE 5; 5; 5; 5 MG/1; MG/1; MG/1; MG/1
20 TABLET ORAL 2 TIMES DAILY
Qty: 60 TABLET | Refills: 0 | Status: SHIPPED | OUTPATIENT
Start: 2023-10-08 | End: 2023-11-07

## 2023-11-07 DIAGNOSIS — F90.2 ATTENTION DEFICIT HYPERACTIVITY DISORDER (ADHD), COMBINED TYPE: ICD-10-CM

## 2023-11-07 NOTE — TELEPHONE ENCOUNTER
Patient states that he need a RX sent over asap amphetamine-dextroamphetamine (ADDERALL, 20MG,) 20 MG tablet he can be reached @ [phone]

## 2023-11-08 ENCOUNTER — TELEPHONE (OUTPATIENT)
Age: 58
End: 2023-11-08

## 2023-11-08 DIAGNOSIS — F90.2 ATTENTION DEFICIT HYPERACTIVITY DISORDER (ADHD), COMBINED TYPE: ICD-10-CM

## 2023-11-08 RX ORDER — DEXTROAMPHETAMINE SACCHARATE, AMPHETAMINE ASPARTATE MONOHYDRATE, DEXTROAMPHETAMINE SULFATE AND AMPHETAMINE SULFATE 5; 5; 5; 5 MG/1; MG/1; MG/1; MG/1
20 CAPSULE, EXTENDED RELEASE ORAL 2 TIMES DAILY
Qty: 30 CAPSULE | Refills: 0 | OUTPATIENT
Start: 2023-11-08 | End: 2024-01-07

## 2023-11-08 RX ORDER — DEXTROAMPHETAMINE SACCHARATE, AMPHETAMINE ASPARTATE MONOHYDRATE, DEXTROAMPHETAMINE SULFATE AND AMPHETAMINE SULFATE 5; 5; 5; 5 MG/1; MG/1; MG/1; MG/1
20 CAPSULE, EXTENDED RELEASE ORAL EVERY MORNING
Qty: 30 CAPSULE | Refills: 0 | Status: SHIPPED | OUTPATIENT
Start: 2023-11-08 | End: 2023-11-09 | Stop reason: SDUPTHER

## 2023-11-08 RX ORDER — DEXTROAMPHETAMINE SACCHARATE, AMPHETAMINE ASPARTATE, DEXTROAMPHETAMINE SULFATE AND AMPHETAMINE SULFATE 5; 5; 5; 5 MG/1; MG/1; MG/1; MG/1
20 TABLET ORAL 2 TIMES DAILY
Qty: 60 TABLET | Refills: 0 | Status: SHIPPED | OUTPATIENT
Start: 2023-11-08 | End: 2023-12-29 | Stop reason: ALTCHOICE

## 2023-11-08 NOTE — TELEPHONE ENCOUNTER
The extended release  Adderall Extended Release is available at [facility] now please send prescription over  to pharmacy before the medication is sold out.

## 2023-11-08 NOTE — TELEPHONE ENCOUNTER
Med refill loaded with updated directions per patient's wife. Adderall XR 20 MG CAPSULE TWICE DAILY PER WIFE. Regular adderall was sent with twice daily instructions. XR was sent as once daily. Updated with twice daily instructions sent to pcp for review.

## 2023-11-08 NOTE — TELEPHONE ENCOUNTER
Patient wife states that her  need this RX filled amphetamine-dextroamphetamine (ADDERALL XR) 20 MG extended release capsule she can be reached @ [phone]

## 2023-11-09 DIAGNOSIS — F90.2 ATTENTION DEFICIT HYPERACTIVITY DISORDER (ADHD), COMBINED TYPE: ICD-10-CM

## 2023-11-09 RX ORDER — DEXTROAMPHETAMINE SACCHARATE, AMPHETAMINE ASPARTATE MONOHYDRATE, DEXTROAMPHETAMINE SULFATE AND AMPHETAMINE SULFATE 5; 5; 5; 5 MG/1; MG/1; MG/1; MG/1
20 CAPSULE, EXTENDED RELEASE ORAL 2 TIMES DAILY
Qty: 60 CAPSULE | Refills: 0 | Status: SHIPPED | OUTPATIENT
Start: 2023-11-09 | End: 2023-12-09

## 2023-12-29 ENCOUNTER — OFFICE VISIT (OUTPATIENT)
Age: 58
End: 2023-12-29
Payer: COMMERCIAL

## 2023-12-29 VITALS
OXYGEN SATURATION: 97 % | BODY MASS INDEX: 36.94 KG/M2 | SYSTOLIC BLOOD PRESSURE: 115 MMHG | WEIGHT: 258 LBS | DIASTOLIC BLOOD PRESSURE: 70 MMHG | HEIGHT: 70 IN | TEMPERATURE: 98 F | RESPIRATION RATE: 16 BRPM | HEART RATE: 89 BPM

## 2023-12-29 DIAGNOSIS — Z23 ENCOUNTER FOR IMMUNIZATION: ICD-10-CM

## 2023-12-29 DIAGNOSIS — Z23 NEED FOR PROPHYLACTIC VACCINATION AGAINST DIPHTHERIA-TETANUS-PERTUSSIS (DTP): ICD-10-CM

## 2023-12-29 DIAGNOSIS — I10 ESSENTIAL HYPERTENSION: ICD-10-CM

## 2023-12-29 DIAGNOSIS — F90.2 ATTENTION DEFICIT HYPERACTIVITY DISORDER (ADHD), COMBINED TYPE: Primary | ICD-10-CM

## 2023-12-29 LAB
ALBUMIN SERPL-MCNC: 4 G/DL (ref 3.5–5)
ALBUMIN/GLOB SERPL: 1.1 (ref 1.1–2.2)
ALP SERPL-CCNC: 66 U/L (ref 45–117)
ALT SERPL-CCNC: 43 U/L (ref 12–78)
ANION GAP SERPL CALC-SCNC: 2 MMOL/L (ref 5–15)
AST SERPL-CCNC: 28 U/L (ref 15–37)
BASOPHILS # BLD: 0 K/UL (ref 0–0.1)
BASOPHILS NFR BLD: 1 % (ref 0–1)
BILIRUB SERPL-MCNC: 0.4 MG/DL (ref 0.2–1)
BUN SERPL-MCNC: 18 MG/DL (ref 6–20)
BUN/CREAT SERPL: 16 (ref 12–20)
CALCIUM SERPL-MCNC: 9.1 MG/DL (ref 8.5–10.1)
CHLORIDE SERPL-SCNC: 101 MMOL/L (ref 97–108)
CO2 SERPL-SCNC: 34 MMOL/L (ref 21–32)
CREAT SERPL-MCNC: 1.13 MG/DL (ref 0.7–1.3)
DIFFERENTIAL METHOD BLD: ABNORMAL
EOSINOPHIL # BLD: 0.2 K/UL (ref 0–0.4)
EOSINOPHIL NFR BLD: 4 % (ref 0–7)
ERYTHROCYTE [DISTWIDTH] IN BLOOD BY AUTOMATED COUNT: 13.7 % (ref 11.5–14.5)
GLOBULIN SER CALC-MCNC: 3.5 G/DL (ref 2–4)
GLUCOSE SERPL-MCNC: 182 MG/DL (ref 65–100)
HCT VFR BLD AUTO: 41.9 % (ref 36.6–50.3)
HGB BLD-MCNC: 14.4 G/DL (ref 12.1–17)
IMM GRANULOCYTES # BLD AUTO: 0 K/UL (ref 0–0.04)
IMM GRANULOCYTES NFR BLD AUTO: 1 % (ref 0–0.5)
LYMPHOCYTES # BLD: 1.9 K/UL (ref 0.8–3.5)
LYMPHOCYTES NFR BLD: 38 % (ref 12–49)
MCH RBC QN AUTO: 30.7 PG (ref 26–34)
MCHC RBC AUTO-ENTMCNC: 34.4 G/DL (ref 30–36.5)
MCV RBC AUTO: 89.3 FL (ref 80–99)
MONOCYTES # BLD: 0.4 K/UL (ref 0–1)
MONOCYTES NFR BLD: 9 % (ref 5–13)
NEUTS SEG # BLD: 2.4 K/UL (ref 1.8–8)
NEUTS SEG NFR BLD: 47 % (ref 32–75)
NRBC # BLD: 0 K/UL (ref 0–0.01)
NRBC BLD-RTO: 0 PER 100 WBC
PLATELET # BLD AUTO: 191 K/UL (ref 150–400)
PMV BLD AUTO: 11.3 FL (ref 8.9–12.9)
POTASSIUM SERPL-SCNC: 3.9 MMOL/L (ref 3.5–5.1)
PROT SERPL-MCNC: 7.5 G/DL (ref 6.4–8.2)
RBC # BLD AUTO: 4.69 M/UL (ref 4.1–5.7)
SODIUM SERPL-SCNC: 137 MMOL/L (ref 136–145)
WBC # BLD AUTO: 5.1 K/UL (ref 4.1–11.1)

## 2023-12-29 PROCEDURE — 3074F SYST BP LT 130 MM HG: CPT | Performed by: FAMILY MEDICINE

## 2023-12-29 PROCEDURE — 99213 OFFICE O/P EST LOW 20 MIN: CPT | Performed by: FAMILY MEDICINE

## 2023-12-29 PROCEDURE — 90471 IMMUNIZATION ADMIN: CPT | Performed by: FAMILY MEDICINE

## 2023-12-29 PROCEDURE — 3078F DIAST BP <80 MM HG: CPT | Performed by: FAMILY MEDICINE

## 2023-12-29 PROCEDURE — 90472 IMMUNIZATION ADMIN EACH ADD: CPT | Performed by: FAMILY MEDICINE

## 2023-12-29 PROCEDURE — 90715 TDAP VACCINE 7 YRS/> IM: CPT | Performed by: FAMILY MEDICINE

## 2023-12-29 PROCEDURE — 90674 CCIIV4 VAC NO PRSV 0.5 ML IM: CPT | Performed by: FAMILY MEDICINE

## 2023-12-29 RX ORDER — DEXTROAMPHETAMINE SACCHARATE, AMPHETAMINE ASPARTATE MONOHYDRATE, DEXTROAMPHETAMINE SULFATE AND AMPHETAMINE SULFATE 5; 5; 5; 5 MG/1; MG/1; MG/1; MG/1
20 CAPSULE, EXTENDED RELEASE ORAL 2 TIMES DAILY
Qty: 60 CAPSULE | Refills: 0 | Status: SHIPPED | OUTPATIENT
Start: 2023-12-29 | End: 2024-01-28

## 2023-12-29 RX ORDER — ATOMOXETINE 40 MG/1
40 CAPSULE ORAL DAILY
Qty: 90 CAPSULE | Refills: 3 | Status: SHIPPED | OUTPATIENT
Start: 2023-12-29

## 2023-12-29 RX ORDER — TADALAFIL 20 MG/1
20 TABLET ORAL PRN
Qty: 30 TABLET | Refills: 3 | Status: SHIPPED | OUTPATIENT
Start: 2023-12-29

## 2023-12-29 RX ORDER — OLMESARTAN MEDOXOMIL AND HYDROCHLOROTHIAZIDE 20/12.5 20; 12.5 MG/1; MG/1
1 TABLET ORAL DAILY
Qty: 90 TABLET | Refills: 3 | Status: SHIPPED | OUTPATIENT
Start: 2023-12-29

## 2023-12-29 NOTE — PROGRESS NOTES
Chief Complaint   Patient presents with    Hypertension    Follow-up    Medication Refill         1. \"Have you been to the ER, urgent care clinic since your last visit? Hospitalized since your last visit? \" No     2. \"Have you seen or consulted any other health care providers outside of the [address] [place] [address] Avenue since your last visit? \" [facility] - Dr. Meaghan Arteaga and [facility]- Dr. Mercedes Allen     3. For patients aged 43-73: Has the patient had a colonoscopy / FIT/ Cologuard? NO      If the patient is female:    4. For patients aged 43-66: Has the patient had a mammogram within the past 2 years? N/A      5. For patients aged 21-65: Has the patient had a pap smear? N/A      Health Maintenance Due   Topic Date Due    Hepatitis B vaccine (1 of 3 - 3-dose series) Never done    HIV screen  Never done    Hepatitis C screen  Never done    Colorectal Cancer Screen  Never done    Shingles vaccine (1 of 2) Never done    DTaP/Tdap/Td vaccine (2 - Td or Tdap) 09/26/2022    Flu vaccine (1) 08/01/2023    COVID-19 Vaccine (5 - 2023-24 season) 09/01/2023     Verbal order from Dr. Jose M Miller for tdap (right) and flu (left) IM deltoids.

## 2023-12-29 NOTE — PROGRESS NOTES
Juan Hernandez (:  1965) is a 63 y.o. male,Established patient, here for evaluation of the following chief complaint(s):  Hypertension, Follow-up, and Medication Refill         ASSESSMENT/PLAN:  1. Attention deficit hyperactivity disorder (ADHD), combined type  -     amphetamine-dextroamphetamine (ADDERALL XR) 20 MG extended release capsule; Take 1 capsule by mouth in the morning and at bedtime for 30 days. Max Daily Amount: 40 mg, Disp-60 capsule, R-0Normal  2. Need for prophylactic vaccination against diphtheria-tetanus-pertussis (DTP)  -     Tdap (age 6y and older) IM (Boostrix)  -     Comprehensive Metabolic Panel; Future  -     CBC with Auto Differential; Future  3. Encounter for immunization  -     Influenza, FLUCELVAX, (age 10 mo+), IM, Preservative Free, 0.5 mL  4. Essential hypertension      Return in about 6 months (around 2024). Subjective   SUBJECTIVE/OBJECTIVE:  HPI In for blood pressure check. No complaints of chest pain, shortness of breath, TIAs, claudication or edema. Needs refill of adderall. Has been having lots of lower back pain and neck for years. Would like to see a chiropractor for back and neck pain. Review of Systems       Objective   Physical Exam  Cardiovascular:      Rate and Rhythm: Normal rate and regular rhythm. Heart sounds: Normal heart sounds. No murmur heard. Pulmonary:      Effort: Pulmonary effort is normal.      Breath sounds: Normal breath sounds. Abdominal:      General: Abdomen is flat. Bowel sounds are normal.      Palpations: Abdomen is soft. Musculoskeletal:      Right lower leg: No edema. Left lower leg: No edema. An electronic signature was used to authenticate this note.     --Kristine Rosenberg MD

## 2024-01-02 ENCOUNTER — TELEPHONE (OUTPATIENT)
Age: 59
End: 2024-01-02

## 2024-02-28 DIAGNOSIS — F90.2 ATTENTION DEFICIT HYPERACTIVITY DISORDER (ADHD), COMBINED TYPE: ICD-10-CM

## 2024-02-29 RX ORDER — DEXTROAMPHETAMINE SACCHARATE, AMPHETAMINE ASPARTATE MONOHYDRATE, DEXTROAMPHETAMINE SULFATE AND AMPHETAMINE SULFATE 5; 5; 5; 5 MG/1; MG/1; MG/1; MG/1
20 CAPSULE, EXTENDED RELEASE ORAL 2 TIMES DAILY
Qty: 60 CAPSULE | Refills: 0 | Status: SHIPPED | OUTPATIENT
Start: 2024-02-29 | End: 2024-03-30

## 2024-02-29 RX ORDER — ATOMOXETINE 40 MG/1
40 CAPSULE ORAL DAILY
Qty: 90 CAPSULE | Refills: 3 | Status: SHIPPED | OUTPATIENT
Start: 2024-02-29

## 2024-04-01 ENCOUNTER — TELEPHONE (OUTPATIENT)
Age: 59
End: 2024-04-01

## 2024-04-01 NOTE — TELEPHONE ENCOUNTER
Patient wife Dona states that she would like a call from nurse regarding her  labs please give her a call  @ [phone]

## 2024-04-02 NOTE — TELEPHONE ENCOUNTER
Patient's wife states she received a bill for lab work done 12/29/23 for cmp and cbc for approximately $80.  Complained that address was never updated.  Explained that update on address is done at check in with patient and . Bill was sent to old address.  If patient doesn't update maybe that is why it was not done.  Glad Tracey fixed this for her at call.   Wife upset that she is getting a bill and states she is pushing back as this has never been done before. Will give message to office coordinater and also lab.

## 2024-04-05 DIAGNOSIS — F90.2 ATTENTION DEFICIT HYPERACTIVITY DISORDER (ADHD), COMBINED TYPE: ICD-10-CM

## 2024-04-05 NOTE — TELEPHONE ENCOUNTER
Patient is requesting a RX refill  amphetamine-dextroamphetamine (ADDERALL XR) 20 MG extended release capsule he can be reached @ [phone]

## 2024-04-05 NOTE — TELEPHONE ENCOUNTER
Last office visit with pcp 12/9/23.  Next office visit  with pcp 7/1/24. Last refill 2/29/24 #60 no refills.

## 2024-04-06 RX ORDER — DEXTROAMPHETAMINE SACCHARATE, AMPHETAMINE ASPARTATE MONOHYDRATE, DEXTROAMPHETAMINE SULFATE AND AMPHETAMINE SULFATE 5; 5; 5; 5 MG/1; MG/1; MG/1; MG/1
20 CAPSULE, EXTENDED RELEASE ORAL 2 TIMES DAILY
Qty: 60 CAPSULE | Refills: 0 | Status: SHIPPED | OUTPATIENT
Start: 2024-04-06 | End: 2024-05-06

## 2024-05-07 ENCOUNTER — TELEPHONE (OUTPATIENT)
Age: 59
End: 2024-05-07

## 2024-05-07 NOTE — TELEPHONE ENCOUNTER
Patient's wife Dona called requesting that patient be seen ttoday for hospital follow up was discharged last night from [facility]  with  pleurisy  and was in Critical Care Step Down. Was told by the MD there that patient needed to be  seen today by PCP. Dona can be reached at [phone].

## 2024-05-21 DIAGNOSIS — F90.2 ATTENTION DEFICIT HYPERACTIVITY DISORDER (ADHD), COMBINED TYPE: ICD-10-CM

## 2024-05-22 RX ORDER — DEXTROAMPHETAMINE SACCHARATE, AMPHETAMINE ASPARTATE MONOHYDRATE, DEXTROAMPHETAMINE SULFATE AND AMPHETAMINE SULFATE 5; 5; 5; 5 MG/1; MG/1; MG/1; MG/1
20 CAPSULE, EXTENDED RELEASE ORAL 2 TIMES DAILY
Qty: 60 CAPSULE | Refills: 0 | Status: SHIPPED | OUTPATIENT
Start: 2024-05-22 | End: 2024-06-21

## 2024-06-03 ENCOUNTER — OFFICE VISIT (OUTPATIENT)
Age: 59
End: 2024-06-03
Payer: MEDICAID

## 2024-06-03 DIAGNOSIS — E11.65 TYPE 2 DIABETES MELLITUS WITH HYPERGLYCEMIA, WITHOUT LONG-TERM CURRENT USE OF INSULIN (HCC): Primary | ICD-10-CM

## 2024-06-03 PROCEDURE — G0108 DIAB MANAGE TRN  PER INDIV: HCPCS

## 2024-06-03 NOTE — PROGRESS NOTES
[facility]  Diabetes Self-Management Education & Support Program    Reason for Referral: DM 2  Referral Source: Kemal Farrar, *  Services requested: DSMES       ASSESSMENT    From my perspective, the participant would benefit from DSMES specifically related to reducing risks, healthy eating, monitoring, taking medications, physical activity, healthy coping, and problem solving. Will adapt DSMES program to build on participant's skills score, confidence score, and preparedness score as noted in the Diabetes Skills, Confidence, and Preparedness Index.    During the program, we will focus on providing DSMES that specifically addresses participant's interest in reducing risks, healthy eating, monitoring, taking medications, physical activity, healthy coping, and problem solving, as shown by their reported readiness to change.    The participant would be best served by attending weekly individual sessions.    Diabetes Self-Management Education Follow-up Visit: 6/17/24       Clinical Presentation  Anup Lacey Sr. is a 58 y.o. White male referred for diabetes self-management education. Participant has Type 2 DM not on insulin for <1 year. Family history positive for diabetes. Patient reports not receiving DSMES services in the past. Most recent A1c value:   Lab Results   Component Value Date/Time    SHW7BPDF 6.1 08/08/2019 12:08 PM       Diabetes-related medical history:    Neurological complications  peripheral neuropathy      Diabetes-related medications:  Current dosing:     Ozempic 0.5 mg weekly injection    Blood Pressure Management  olmesartan-hydroCHLOROthiazide - 20-12.5 MG      Lipid Management  This patient does not have an active medication from one of the medication groupers.      Clot Prevention  This patient does not have an active medication from one of the medication groupers.    Learning Assessment  Learning objectives Educator assessment (6/3/2024)   Diabetes  Patient had an office visit with Dr. Mas today.      Ofev 150 mg BID was ordered for patient, sent to [facility]. Loperamide and Zofran were prescribed for possible GI side effects with Ofev.     Patient had hepatic function labs less than one month ago (11/13/23).      Obtained verbal consent from patient to enroll in [employer] program. Enrolled patient in [employer] via online program. Aware that FLOR Conn with [employer] will reach out to patient to schedule a meeting prior to starting the Ofev.     Patient will need LFT monthly x3 months after starting Ofev.       Postpone x2 days to follow-up with [facility] pharmacy.

## 2024-06-17 ENCOUNTER — OFFICE VISIT (OUTPATIENT)
Age: 59
End: 2024-06-17
Payer: MEDICAID

## 2024-06-17 DIAGNOSIS — E11.65 TYPE 2 DIABETES MELLITUS WITH HYPERGLYCEMIA, WITHOUT LONG-TERM CURRENT USE OF INSULIN (HCC): Primary | ICD-10-CM

## 2024-06-17 PROCEDURE — G0108 DIAB MANAGE TRN  PER INDIV: HCPCS

## 2024-06-19 NOTE — PROGRESS NOTES
[facility]  Diabetes Self-Management Education & Support Program  Encounter Note      SUMMARY  Diabetes self-care management training was completed related to what is diabetes? And healthy eating was started.  The participant will return on June 24 to continue DSMES related to healthy eating. The participant did not identify SMART Goal(s) and will practice knowledge and skills related to healthy eating to improve diabetes self-management.        EVALUATION:  Mr Lacey expressed understanding of T 2 DM disease process & the ADA recommended targets for BG & A1c. He is not checking BG.  Anup expressed understanding of using healthy plate to build balanced, CHO controlled meals. He shared has cut out chocolate milk and pretzels in diet.      RECOMMENDATIONS:  Continue DSMES.     TOPICS DISCUSSED TODAY:  WHAT IS DIABETES? Minutes: 30  WHAT CAN I EAT? 30      Next provider visit is scheduled for 7/1/24       DATE DSMES TOPIC EVALUATION     6/19/2024 WHAT IS DIABETES?   Role of the normal pancreas in energy balance and blood glucose control   The defect seen in diabetes   Signs & symptoms of diabetes   Diagnosis of diabetes   Types of diabetes   Blood glucose targets in non-pregnant & non-pregnant adults       The participant knows  Their type of diabetes: Yes   The basic physiologic defect: Yes  Blood glucose targets: Yes     DATE DSMES TOPIC EVALUATION     6/19/2024 WHAT CAN I EAT?   General principles   Determining a healthy weight   Nutritional terms & tools   Healthy Plate method   Carbohydrate Counting   Reading food labels   Free apps   Pregnancy recommendations      The participant   Uses Healthy Plate principles in constructing meals: Yes  Reads food labels in choosing acceptable foods: Yes           PATEL ZHANG RN on 6/19/2024 at 4:20 PM    I have personally reviewed the health record, including provider notes, laboratory data and current medications before making these care and

## 2024-06-26 DIAGNOSIS — F90.2 ATTENTION DEFICIT HYPERACTIVITY DISORDER (ADHD), COMBINED TYPE: ICD-10-CM

## 2024-06-26 RX ORDER — DEXTROAMPHETAMINE SACCHARATE, AMPHETAMINE ASPARTATE MONOHYDRATE, DEXTROAMPHETAMINE SULFATE AND AMPHETAMINE SULFATE 5; 5; 5; 5 MG/1; MG/1; MG/1; MG/1
20 CAPSULE, EXTENDED RELEASE ORAL 2 TIMES DAILY
Qty: 60 CAPSULE | Refills: 0 | Status: SHIPPED | OUTPATIENT
Start: 2024-06-26 | End: 2024-07-26

## 2024-07-01 ENCOUNTER — OFFICE VISIT (OUTPATIENT)
Age: 59
End: 2024-07-01
Payer: MEDICAID

## 2024-07-01 VITALS
RESPIRATION RATE: 16 BRPM | BODY MASS INDEX: 36.31 KG/M2 | WEIGHT: 253.6 LBS | SYSTOLIC BLOOD PRESSURE: 109 MMHG | HEIGHT: 70 IN | HEART RATE: 90 BPM | OXYGEN SATURATION: 97 % | DIASTOLIC BLOOD PRESSURE: 64 MMHG

## 2024-07-01 DIAGNOSIS — E11.9 TYPE 2 DIABETES MELLITUS WITHOUT COMPLICATION, WITHOUT LONG-TERM CURRENT USE OF INSULIN (HCC): ICD-10-CM

## 2024-07-01 DIAGNOSIS — E66.01 SEVERE OBESITY (BMI 35.0-39.9) WITH COMORBIDITY (HCC): ICD-10-CM

## 2024-07-01 DIAGNOSIS — R09.1 PLEURISY: ICD-10-CM

## 2024-07-01 DIAGNOSIS — R60.0 LOCALIZED EDEMA: Primary | ICD-10-CM

## 2024-07-01 PROCEDURE — 99214 OFFICE O/P EST MOD 30 MIN: CPT | Performed by: FAMILY MEDICINE

## 2024-07-01 PROCEDURE — 3074F SYST BP LT 130 MM HG: CPT | Performed by: FAMILY MEDICINE

## 2024-07-01 PROCEDURE — 3078F DIAST BP <80 MM HG: CPT | Performed by: FAMILY MEDICINE

## 2024-07-01 RX ORDER — ROSUVASTATIN CALCIUM 10 MG/1
10 TABLET, COATED ORAL NIGHTLY
COMMUNITY
Start: 2024-05-10

## 2024-07-01 RX ORDER — SEMAGLUTIDE 0.68 MG/ML
0.5 INJECTION, SOLUTION SUBCUTANEOUS
COMMUNITY
Start: 2024-06-10

## 2024-07-01 RX ORDER — FUROSEMIDE 40 MG/1
TABLET ORAL
Qty: 60 TABLET | Refills: 3 | Status: SHIPPED | OUTPATIENT
Start: 2024-07-01

## 2024-07-01 SDOH — ECONOMIC STABILITY: FOOD INSECURITY: WITHIN THE PAST 12 MONTHS, THE FOOD YOU BOUGHT JUST DIDN'T LAST AND YOU DIDN'T HAVE MONEY TO GET MORE.: NEVER TRUE

## 2024-07-01 SDOH — ECONOMIC STABILITY: HOUSING INSECURITY
IN THE LAST 12 MONTHS, WAS THERE A TIME WHEN YOU DID NOT HAVE A STEADY PLACE TO SLEEP OR SLEPT IN A SHELTER (INCLUDING NOW)?: NO

## 2024-07-01 SDOH — ECONOMIC STABILITY: FOOD INSECURITY: WITHIN THE PAST 12 MONTHS, YOU WORRIED THAT YOUR FOOD WOULD RUN OUT BEFORE YOU GOT MONEY TO BUY MORE.: NEVER TRUE

## 2024-07-01 SDOH — ECONOMIC STABILITY: INCOME INSECURITY: HOW HARD IS IT FOR YOU TO PAY FOR THE VERY BASICS LIKE FOOD, HOUSING, MEDICAL CARE, AND HEATING?: NOT HARD AT ALL

## 2024-07-01 SDOH — ECONOMIC STABILITY: TRANSPORTATION INSECURITY
IN THE PAST 12 MONTHS, HAS LACK OF TRANSPORTATION KEPT YOU FROM MEETINGS, WORK, OR FROM GETTING THINGS NEEDED FOR DAILY LIVING?: NO
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
## 2024-07-02 LAB
ALBUMIN SERPL-MCNC: 4.3 G/DL (ref 3.8–4.9)
ALP SERPL-CCNC: 76 IU/L (ref 44–121)
ALT SERPL-CCNC: 27 IU/L (ref 0–44)
APPEARANCE UR: CLEAR
AST SERPL-CCNC: 23 IU/L (ref 0–40)
BACTERIA #/AREA URNS HPF: NORMAL /[HPF]
BASOPHILS # BLD AUTO: 0.1 X10E3/UL (ref 0–0.2)
BASOPHILS NFR BLD AUTO: 1 %
BILIRUB SERPL-MCNC: 0.3 MG/DL (ref 0–1.2)
BILIRUB UR QL STRIP: NEGATIVE
BUN SERPL-MCNC: 16 MG/DL (ref 6–24)
BUN/CREAT SERPL: 17 (ref 9–20)
CALCIUM SERPL-MCNC: 9.5 MG/DL (ref 8.7–10.2)
CASTS URNS QL MICRO: NORMAL /LPF
CHLORIDE SERPL-SCNC: 102 MMOL/L (ref 96–106)
CO2 SERPL-SCNC: 26 MMOL/L (ref 20–29)
COLOR UR: YELLOW
CREAT SERPL-MCNC: 0.93 MG/DL (ref 0.76–1.27)
EGFRCR SERPLBLD CKD-EPI 2021: 96 ML/MIN/1.73
EOSINOPHIL # BLD AUTO: 0.1 X10E3/UL (ref 0–0.4)
EOSINOPHIL NFR BLD AUTO: 4 %
EPI CELLS #/AREA URNS HPF: NORMAL /HPF (ref 0–10)
ERYTHROCYTE [DISTWIDTH] IN BLOOD BY AUTOMATED COUNT: 14.4 % (ref 11.6–15.4)
ERYTHROCYTE [SEDIMENTATION RATE] IN BLOOD BY WESTERGREN METHOD: 9 MM/HR (ref 0–30)
GLOBULIN SER CALC-MCNC: 2.7 G/DL (ref 1.5–4.5)
GLUCOSE SERPL-MCNC: 100 MG/DL (ref 70–99)
GLUCOSE UR QL STRIP: NEGATIVE
HCT VFR BLD AUTO: 41.2 % (ref 37.5–51)
HGB BLD-MCNC: 14 G/DL (ref 13–17.7)
HGB UR QL STRIP: NEGATIVE
IMM GRANULOCYTES # BLD AUTO: 0 X10E3/UL (ref 0–0.1)
IMM GRANULOCYTES NFR BLD AUTO: 0 %
KETONES UR QL STRIP: NEGATIVE
LEUKOCYTE ESTERASE UR QL STRIP: NEGATIVE
LYMPHOCYTES # BLD AUTO: 2.1 X10E3/UL (ref 0.7–3.1)
LYMPHOCYTES NFR BLD AUTO: 52 %
MCH RBC QN AUTO: 30.8 PG (ref 26.6–33)
MCHC RBC AUTO-ENTMCNC: 34 G/DL (ref 31.5–35.7)
MCV RBC AUTO: 91 FL (ref 79–97)
MICRO URNS: NORMAL
MICRO URNS: NORMAL
MONOCYTES # BLD AUTO: 0.4 X10E3/UL (ref 0.1–0.9)
MONOCYTES NFR BLD AUTO: 10 %
NEUTROPHILS # BLD AUTO: 1.3 X10E3/UL (ref 1.4–7)
NEUTROPHILS NFR BLD AUTO: 33 %
NITRITE UR QL STRIP: NEGATIVE
PH UR STRIP: 5.5 [PH] (ref 5–7.5)
PLATELET # BLD AUTO: 194 X10E3/UL (ref 150–450)
POTASSIUM SERPL-SCNC: 4 MMOL/L (ref 3.5–5.2)
PROT SERPL-MCNC: 7 G/DL (ref 6–8.5)
PROT UR QL STRIP: NEGATIVE
RBC # BLD AUTO: 4.54 X10E6/UL (ref 4.14–5.8)
RBC #/AREA URNS HPF: NORMAL /HPF (ref 0–2)
SODIUM SERPL-SCNC: 140 MMOL/L (ref 134–144)
SP GR UR STRIP: 1.02 (ref 1–1.03)
UROBILINOGEN UR STRIP-MCNC: 0.2 MG/DL (ref 0.2–1)
WBC # BLD AUTO: 3.9 X10E3/UL (ref 3.4–10.8)
WBC #/AREA URNS HPF: NORMAL /HPF (ref 0–5)

## 2024-07-04 LAB
ANA SER QL IF: NEGATIVE
LABORATORY COMMENT REPORT: NORMAL

## 2024-07-12 ENCOUNTER — OFFICE VISIT (OUTPATIENT)
Age: 59
End: 2024-07-12
Payer: MEDICAID

## 2024-07-12 DIAGNOSIS — E11.65 TYPE 2 DIABETES MELLITUS WITH HYPERGLYCEMIA, WITHOUT LONG-TERM CURRENT USE OF INSULIN (HCC): Primary | ICD-10-CM

## 2024-07-12 PROCEDURE — G0108 DIAB MANAGE TRN  PER INDIV: HCPCS

## 2024-07-16 NOTE — PROGRESS NOTES
[facility]  Diabetes Self-Management Education & Support Program  Encounter Note      SUMMARY  Diabetes self-care management training was completed related to healthy eating. The participant will return on September 06 to continue DSMES related to reducing risks and monitoring. The participant did not identify SMART Goal(s) and will practice knowledge and skills related to healthy eating to improve diabetes self-management.        EVALUATION:  Mr Lacey expressed understanding of using healthy plate & counting CHOs to build balanced, portion & CHO controlled meals. He expressed understanding of reading nutrition facts labels to make more informed food choices. He has lost 5 lbs.     RECOMMENDATIONS:  Read nutrition facts labels of favorite foods at home.  Use healthy plate 1-2 times a week.  Continue DSMES.     TOPICS DISCUSSED TODAY:  WHAT CAN I EAT? 60      Next provider visit is scheduled for 1/2025       DATE DSMES TOPIC EVALUATION     7/16/2024 WHAT CAN I EAT?   General principles   Determining a healthy weight   Nutritional terms & tools   Healthy Plate method   Carbohydrate Counting   Reading food labels   Free apps   Pregnancy recommendations      The participant   Uses Healthy Plate principles in constructing meals: Yes  Reads food labels in choosing acceptable foods: Yes           PATEL ZHANG RN on 7/16/2024 at 10:29 AM    I have personally reviewed the health record, including provider notes, laboratory data and current medications before making these care and education recommendations. The time spent in this effort is included in the total time.  Total minutes: 60

## 2024-07-29 DIAGNOSIS — F90.2 ATTENTION DEFICIT HYPERACTIVITY DISORDER (ADHD), COMBINED TYPE: ICD-10-CM

## 2024-07-29 RX ORDER — DEXTROAMPHETAMINE SACCHARATE, AMPHETAMINE ASPARTATE MONOHYDRATE, DEXTROAMPHETAMINE SULFATE AND AMPHETAMINE SULFATE 5; 5; 5; 5 MG/1; MG/1; MG/1; MG/1
20 CAPSULE, EXTENDED RELEASE ORAL 2 TIMES DAILY
Qty: 60 CAPSULE | Refills: 0 | Status: SHIPPED | OUTPATIENT
Start: 2024-07-29 | End: 2024-08-28

## 2024-08-16 NOTE — PROGRESS NOTES
Chief Complaint   Patient presents with    Medication Refill     1. Have you been to the ER, urgent care clinic since your last visit? Hospitalized since your last visit? No    2. Have you seen or consulted any other health care providers outside of the [address] [place] [address] since your last visit? Include any pap smears or colon screening.  No     Health Maintenance Due   Topic Date Due    Pneumococcal 19-64 Highest Risk (1 of 3 - PCV13) 09/19/1985    FOBT Q 1 YEAR AGE 50-75  09/19/2016
HISTORY OF PRESENT ILLNESS  Phani Ramey is a 49 y.o. male. HPI In for blood pressure check and refill add meds. ROS    Physical Exam   Constitutional: He appears well-developed and well-nourished. HENT:   Right Ear: External ear normal.   Left Ear: External ear normal.   Mouth/Throat: Oropharynx is clear and moist.   Neck: No thyromegaly present. Cardiovascular: Normal rate, regular rhythm, normal heart sounds and intact distal pulses. Pulmonary/Chest: Effort normal and breath sounds normal. No respiratory distress. He has no wheezes. Abdominal: Soft. Bowel sounds are normal. He exhibits no distension and no mass. There is no tenderness. There is no guarding. Musculoskeletal: Normal range of motion. He exhibits no edema. Lymphadenopathy:     He has no cervical adenopathy. Nursing note and vitals reviewed. ASSESSMENT and PLAN  Orders Placed This Encounter    amphetamine-dextroamphetamine XR (ADDERALL XR) 20 mg XR capsule     Tyshawn Romo was seen today for medication refill. Diagnoses and all orders for this visit:    Encounter for screening fecal occult blood testing    Other orders  -     Cancel: AMB POC FECAL BLOOD, OCCULT, QL 1 CARD  -     amphetamine-dextroamphetamine XR (ADDERALL XR) 20 mg XR capsule; Take 2 Caps (40 mg total) by mouth every morning. Max Daily Amount: 40 mg      Follow-up Disposition:  Return in about 3 months (around 9/6/2017).
complains of pain/discomfort

## 2024-09-06 ENCOUNTER — TELEPHONE (OUTPATIENT)
Age: 59
End: 2024-09-06

## 2024-09-06 ENCOUNTER — OFFICE VISIT (OUTPATIENT)
Age: 59
End: 2024-09-06

## 2024-09-06 DIAGNOSIS — F90.2 ATTENTION DEFICIT HYPERACTIVITY DISORDER (ADHD), COMBINED TYPE: ICD-10-CM

## 2024-09-06 DIAGNOSIS — E11.65 TYPE 2 DIABETES MELLITUS WITH HYPERGLYCEMIA, WITHOUT LONG-TERM CURRENT USE OF INSULIN (HCC): Primary | ICD-10-CM

## 2024-09-06 RX ORDER — DEXTROAMPHETAMINE SACCHARATE, AMPHETAMINE ASPARTATE MONOHYDRATE, DEXTROAMPHETAMINE SULFATE AND AMPHETAMINE SULFATE 5; 5; 5; 5 MG/1; MG/1; MG/1; MG/1
20 CAPSULE, EXTENDED RELEASE ORAL 2 TIMES DAILY
Qty: 60 CAPSULE | Refills: 0 | Status: SHIPPED | OUTPATIENT
Start: 2024-09-06 | End: 2024-10-06

## 2024-09-06 NOTE — PROGRESS NOTES
DATE DSMES TOPIC EVALUATION     9/6/2024 HOW DO MY DIABETES MEDICATIONS WORK?   Type 1 medications & methods   Insulin injections   Injection sites   Type 2 medications   Oral agents   GLP-1 agonists   Hypoglycemia symptoms & treatment   Glucagon emergency kits   General guidance regarding insulin use whether Type 1, 2 or gestational diabetes   Storage of insulin   Disposal    Traveling with medications   Barriers to medication adherence      The participant   Can describe the expected action & side effects of prescribed diabetes medications: Yes  Can demonstrate injection technique (if applicable): Yes         PATEL ZHANG RN on 9/6/2024 at 4:39 PM    I have personally reviewed the health record, including provider notes, laboratory data and current medications before making these care and education recommendations. The time spent in this effort is included in the total time.  Total minutes: 60

## 2024-09-06 NOTE — TELEPHONE ENCOUNTER
Last appointment: 7/1/24  Next appointment: 1/2/25  Previous refill encounter(s): 7/29/24    Requested Prescriptions     Pending Prescriptions Disp Refills    amphetamine-dextroamphetamine (ADDERALL XR) 20 MG extended release capsule 60 capsule 0     Sig: Take 1 capsule by mouth in the morning and at bedtime for 30 days. Max Daily Amount: 40 mg         For Pharmacy Admin Tracking Only    Program: Medication Refill  CPA in place:    Recommendation Provided To:   Intervention Detail: New Rx: 1, reason: Patient Preference  Intervention Accepted By:   Gap Closed?:    Time Spent (min): 5

## 2024-09-06 NOTE — TELEPHONE ENCOUNTER
Per pharmacy, a PA is required on the Amphetamine Salts ER 20mg capsule. Please initiate.      For Pharmacy Admin Tracking Only    Program: Medication Refill  CPA in place:    Recommendation Provided To:   Intervention Detail: New Rx: 0, reason: Cost/Formulary Change  Intervention Accepted By:   Gap Closed?:    Time Spent (min): 5

## 2024-09-09 DIAGNOSIS — F90.2 ATTENTION DEFICIT HYPERACTIVITY DISORDER (ADHD), COMBINED TYPE: ICD-10-CM

## 2024-09-09 RX ORDER — DEXTROAMPHETAMINE SACCHARATE, AMPHETAMINE ASPARTATE MONOHYDRATE, DEXTROAMPHETAMINE SULFATE AND AMPHETAMINE SULFATE 5; 5; 5; 5 MG/1; MG/1; MG/1; MG/1
20 CAPSULE, EXTENDED RELEASE ORAL 2 TIMES DAILY
Qty: 60 CAPSULE | Refills: 0 | Status: SHIPPED | OUTPATIENT
Start: 2024-09-09 | End: 2024-10-09

## 2024-09-10 ENCOUNTER — TELEPHONE (OUTPATIENT)
Age: 59
End: 2024-09-10

## 2024-09-10 NOTE — TELEPHONE ENCOUNTER
Patient would like to let  know that he need a prior auth on  amphetamine-dextroamphetamine (ADDERALL XR) 20 MG extended release capsule he can be reached @ [phone]

## 2024-09-11 ENCOUNTER — TELEPHONE (OUTPATIENT)
Age: 59
End: 2024-09-11

## 2024-09-11 NOTE — TELEPHONE ENCOUNTER
[phone] PA # fax # [phone] to [facility].   Adderall needs prior authorization and patient is out of medication. Patient can be reached at [phone].

## 2024-10-03 DIAGNOSIS — R60.0 LOCALIZED EDEMA: ICD-10-CM

## 2024-10-03 RX ORDER — FUROSEMIDE 40 MG
TABLET ORAL
Qty: 180 TABLET | Refills: 1 | Status: SHIPPED | OUTPATIENT
Start: 2024-10-03

## 2024-10-07 ENCOUNTER — TELEPHONE (OUTPATIENT)
Age: 59
End: 2024-10-07

## 2024-10-07 ENCOUNTER — CLINICAL DOCUMENTATION (OUTPATIENT)
Age: 59
End: 2024-10-07

## 2024-10-07 NOTE — PROGRESS NOTES
Patient's wife Dona called regarding Anup's prior authorization for his Adderall I connected with Nurse Odette that contacted the wife.

## 2024-10-07 NOTE — PROGRESS NOTES
Spoke with wife, Dona, and explained to her that the pharmacy had the wrong fax number, as we spoke with pharmacy and had to give correct fax number.  Explained to wife that once we receive the prior auth we will fill it out and then it will be up the insurance as to how long they take to give the approval.  She stated understanding.

## 2024-10-07 NOTE — TELEPHONE ENCOUNTER
Patient's wife Dona would like a call back about getting a prior authorization for Adderall. Dona can be reached at [phone].    vomiting and diarrhea x 1 day.  c/o weakness vomiting and diarrhea x 1 day.  c/o weakness and chest pressure h/o mi

## 2024-10-14 DIAGNOSIS — F90.2 ATTENTION DEFICIT HYPERACTIVITY DISORDER (ADHD), COMBINED TYPE: ICD-10-CM

## 2024-10-14 RX ORDER — DEXTROAMPHETAMINE SACCHARATE, AMPHETAMINE ASPARTATE MONOHYDRATE, DEXTROAMPHETAMINE SULFATE AND AMPHETAMINE SULFATE 5; 5; 5; 5 MG/1; MG/1; MG/1; MG/1
20 CAPSULE, EXTENDED RELEASE ORAL 2 TIMES DAILY
Qty: 60 CAPSULE | Refills: 0 | Status: SHIPPED | OUTPATIENT
Start: 2024-10-14 | End: 2024-11-13

## 2024-10-18 ENCOUNTER — OFFICE VISIT (OUTPATIENT)
Age: 59
End: 2024-10-18
Payer: MEDICAID

## 2024-10-18 DIAGNOSIS — E11.65 TYPE 2 DIABETES MELLITUS WITH HYPERGLYCEMIA, WITHOUT LONG-TERM CURRENT USE OF INSULIN (HCC): Primary | ICD-10-CM

## 2024-10-18 PROCEDURE — G0108 DIAB MANAGE TRN  PER INDIV: HCPCS

## 2024-10-22 NOTE — PROGRESS NOTES
[facility]  Diabetes Self-Management Education & Support Program  Encounter Note      SUMMARY  Diabetes self-care management training was completed related to reducing risks. The participant will return on November 22 to continue DSMES related to taking medications and physical activity. The participant did not identify SMART Goal(s) and will practice knowledge and skills related to reducing risks and healthy eating and monitoring to improve diabetes self-management.        EVALUATION:  Mr Lacey expressed understanding of the role of vaccinations, dilated eye, foot and dental exams in preventing diabetes complications & the relationship between DM and heart, kidney, nerve, sleep & eye health.  Anup christie has upcoming dilated eye, dental and foot exams. He has VANGIE and uses CPAP consistently.  He checks FBG 1-2 times a week ranging 117 mg/dL - 120 mg/dL.     RECOMMENDATIONS:  Continue DSMES.     TOPICS DISCUSSED TODAY:  HOW DO I STAY HEALTHY? 60       SMART GOAL(S)   Check BG 1-2 times a week  ACHIEVEMENT OF GOAL(S) : %       DATE DSMES TOPIC EVALUATION     10/22/2024 HOW DO I STAY HEALTHY?   Prevention   Vaccinations   Preconception care (if applicable)  Examinations   Eye    Foot   Diabetic complications' prevention   Dental health   Heart health   Kidney Health   Nerve health   Sleep health      The participant has a personal diabetes care record to keep abreast of diabetes health Yes                PATEL ZHANG RN on 10/22/2024 at 10:30 AM    I have personally reviewed the health record, including provider notes, laboratory data and current medications before making these care and education recommendations. The time spent in this effort is included in the total time.  Total minutes: 60

## 2024-10-24 ENCOUNTER — TELEPHONE (OUTPATIENT)
Age: 59
End: 2024-10-24

## 2024-10-24 NOTE — TELEPHONE ENCOUNTER
Patient wants a return call regarding the olmesartan-hydroCHLOROthiazide (BENICAR HCT) 20-12.5 MG per tablet , he has a question.  Please give him a call @ [phone]

## 2024-10-25 DIAGNOSIS — I10 ESSENTIAL HYPERTENSION: ICD-10-CM

## 2024-10-25 RX ORDER — OLMESARTAN MEDOXOMIL AND HYDROCHLOROTHIAZIDE 20/12.5 20; 12.5 MG/1; MG/1
1 TABLET ORAL DAILY
Qty: 90 TABLET | Refills: 3 | Status: SHIPPED | OUTPATIENT
Start: 2024-10-25

## 2024-11-18 DIAGNOSIS — F90.2 ATTENTION DEFICIT HYPERACTIVITY DISORDER (ADHD), COMBINED TYPE: ICD-10-CM

## 2024-11-19 RX ORDER — DEXTROAMPHETAMINE SACCHARATE, AMPHETAMINE ASPARTATE MONOHYDRATE, DEXTROAMPHETAMINE SULFATE AND AMPHETAMINE SULFATE 5; 5; 5; 5 MG/1; MG/1; MG/1; MG/1
20 CAPSULE, EXTENDED RELEASE ORAL 2 TIMES DAILY
Qty: 60 CAPSULE | Refills: 0 | Status: SHIPPED | OUTPATIENT
Start: 2024-11-19 | End: 2024-12-19

## 2024-11-19 NOTE — TELEPHONE ENCOUNTER
Last appointment: 7/1/24  Next appointment: 1/2/25  Previous refill encounter(s): 10/14/24 #60    Requested Prescriptions     Pending Prescriptions Disp Refills    amphetamine-dextroamphetamine (ADDERALL XR) 20 MG extended release capsule 60 capsule 0     Sig: Take 1 capsule by mouth in the morning and at bedtime for 30 days. Max Daily Amount: 40 mg         For Pharmacy Admin Tracking Only    Program: Medication Refill  CPA in place:    Recommendation Provided To:   Intervention Detail: New Rx: 1, reason: Patient Preference  Intervention Accepted By:   Gap Closed?:    Time Spent (min): 5

## 2024-11-25 ENCOUNTER — OFFICE VISIT (OUTPATIENT)
Age: 59
End: 2024-11-25
Payer: COMMERCIAL

## 2024-11-25 DIAGNOSIS — E11.65 TYPE 2 DIABETES MELLITUS WITH HYPERGLYCEMIA, WITHOUT LONG-TERM CURRENT USE OF INSULIN (HCC): Primary | ICD-10-CM

## 2024-11-25 PROCEDURE — G0108 DIAB MANAGE TRN  PER INDIV: HCPCS

## 2024-11-25 NOTE — PROGRESS NOTES
[facility]  Diabetes Self-Management Education & Support Program  Encounter Note      SUMMARY  Diabetes self-care management training was completed related to physical activity and healthy coping. The participant will return on December 13 to continue DSMES related to problem solving. The participant did not identify SMART Goal(s) and will practice knowledge and skills related to reducing risks, healthy eating and monitoring, being active and medications, and healthy coping to improve diabetes self-management.        EVALUATION:  Mr Lacey expressed understanding of the benefits of increasing physical activity including lowering BG, BP, LDL cholesterol and reducing stress. He has a gym membership and swims, uses elliptical equipment several times a week.    Anup shared he feels supported in his diabetes self management by his wife & family. He walks, plays with his dogs, and watches TV to reduce  stress. He plans to use ADA website, diabetes food hub & [employer] as part of his diabetes self management plan going forward.    RECOMMENDATIONS:  Increase physical activity to include walking & 150 minutes aerobic exercise weekly.     TOPICS DISCUSSED TODAY:  HOW DO MY DIABETES MEDICATIONS WORK? 30  HOW DOES PHYSICAL ACTIVITY AFFECT MY DIABETES? 30      Next provider visit is scheduled for 1/2/25       SMART GOAL(S)   Check BG 1-2 times a week  ACHIEVEMENT OF GOAL(S) : %         DATE DSMES TOPIC EVALUATION     11/25/2024 HOW DOES PHYSICAL ACTIVITY AFFECT MY DIABETES?   Benefits of physical activity   Beginning a program of physical activity   Walking   Pedometers   Goal setting   Structured physical activity program   Aerobic activity   Resistance   Flexibility   Balance   Physical activity program progression   Safety issues   Barriers to physical activity   Facilitators of physical activity        The participant has established a regular physical activity plan: Yes

## 2024-12-13 ENCOUNTER — OFFICE VISIT (OUTPATIENT)
Age: 59
End: 2024-12-13

## 2024-12-13 DIAGNOSIS — E11.65 TYPE 2 DIABETES MELLITUS WITH HYPERGLYCEMIA, WITHOUT LONG-TERM CURRENT USE OF INSULIN (HCC): Primary | ICD-10-CM

## 2024-12-13 NOTE — PROGRESS NOTES
[facility]  Diabetes Self-Management Education & Support Program  Encounter Note      SUMMARY  Diabetes self-care management training was completed related to problem solving. The participant will return on January 31 for post program evaluation. The participant did not identify SMART Goal(s) and will practice knowledge and skills related to reducing risks, healthy eating and monitoring, being active and medications, and healthy coping and problem solving to improve diabetes self-management.        EVALUATION:  Mr Lacey expressed understanding of using problem solving strategies to overcome barriers to diabetes self management. He has a diabetes emergency kit and understands managing DM on sick days.   Anup christie has gained knowledge and feels confident in his ability to manage his diabetes going forward.    RECOMMENDATIONS:  Continue counting CHOs, using healthy plate.  Increase physical activity.     TOPICS DISCUSSED TODAY:  HOW DO I FIGURE OUT WHAT'S INFLUENCING MY BLOOD GLUCOSES? 30      Next provider visit is scheduled for 1/2025       SMART GOAL(S)   Check BG 1-2 times a week  ACHIEVEMENT OF GOAL(S) : %       DATE DSMES TOPIC EVALUATION     12/13/2024 HOW DO I FIGURE OUT WHAT'S INFLUENCING MY BLOOD GLUCOSES?   Problem solving using SOAR   Set goals   Sort options   Arrive at a plan   Reaffirm plan   Common problems in diabetes self-care   Hypoglycemia   Hyperglycemia   Sick days   Pattern management   Disaster preparedness plan        The participant has an action plan for   Hypoglycemia: Yes  Hyperglycemia: Yes  Sick days: Yes  During disasters: Yes           PATEL ZHANG RN on 12/13/2024 at 4:39 PM    I have personally reviewed the health record, including provider notes, laboratory data and current medications before making these care and education recommendations. The time spent in this effort is included in the total time.  Total minutes: 30

## 2024-12-31 DIAGNOSIS — F90.2 ATTENTION DEFICIT HYPERACTIVITY DISORDER (ADHD), COMBINED TYPE: ICD-10-CM

## 2024-12-31 RX ORDER — DEXTROAMPHETAMINE SACCHARATE, AMPHETAMINE ASPARTATE MONOHYDRATE, DEXTROAMPHETAMINE SULFATE AND AMPHETAMINE SULFATE 5; 5; 5; 5 MG/1; MG/1; MG/1; MG/1
20 CAPSULE, EXTENDED RELEASE ORAL 2 TIMES DAILY
Qty: 60 CAPSULE | Refills: 0 | Status: SHIPPED | OUTPATIENT
Start: 2024-12-31 | End: 2025-01-30

## 2025-01-02 ENCOUNTER — OFFICE VISIT (OUTPATIENT)
Age: 60
End: 2025-01-02
Payer: COMMERCIAL

## 2025-01-02 VITALS
HEART RATE: 88 BPM | HEIGHT: 70 IN | WEIGHT: 248.2 LBS | RESPIRATION RATE: 16 BRPM | SYSTOLIC BLOOD PRESSURE: 123 MMHG | DIASTOLIC BLOOD PRESSURE: 78 MMHG | BODY MASS INDEX: 35.53 KG/M2 | TEMPERATURE: 95.7 F | OXYGEN SATURATION: 99 %

## 2025-01-02 DIAGNOSIS — R41.840 ATTENTION OR CONCENTRATION DEFICIT: Primary | ICD-10-CM

## 2025-01-02 DIAGNOSIS — I10 ESSENTIAL HYPERTENSION: ICD-10-CM

## 2025-01-02 DIAGNOSIS — E78.00 PURE HYPERCHOLESTEROLEMIA, UNSPECIFIED: ICD-10-CM

## 2025-01-02 DIAGNOSIS — Z12.5 PROSTATE CANCER SCREENING: ICD-10-CM

## 2025-01-02 DIAGNOSIS — E11.9 TYPE 2 DIABETES MELLITUS WITHOUT COMPLICATION, WITHOUT LONG-TERM CURRENT USE OF INSULIN (HCC): ICD-10-CM

## 2025-01-02 DIAGNOSIS — E66.01 MORBID (SEVERE) OBESITY DUE TO EXCESS CALORIES: ICD-10-CM

## 2025-01-02 DIAGNOSIS — E03.9 ACQUIRED HYPOTHYROIDISM: ICD-10-CM

## 2025-01-02 DIAGNOSIS — Z23 NEEDS FLU SHOT: ICD-10-CM

## 2025-01-02 DIAGNOSIS — Z12.11 COLON CANCER SCREENING: ICD-10-CM

## 2025-01-02 PROCEDURE — 3074F SYST BP LT 130 MM HG: CPT | Performed by: FAMILY MEDICINE

## 2025-01-02 PROCEDURE — 90661 CCIIV3 VAC ABX FR 0.5 ML IM: CPT | Performed by: FAMILY MEDICINE

## 2025-01-02 PROCEDURE — 99214 OFFICE O/P EST MOD 30 MIN: CPT | Performed by: FAMILY MEDICINE

## 2025-01-02 PROCEDURE — 3078F DIAST BP <80 MM HG: CPT | Performed by: FAMILY MEDICINE

## 2025-01-02 PROCEDURE — 90471 IMMUNIZATION ADMIN: CPT | Performed by: FAMILY MEDICINE

## 2025-01-02 RX ORDER — DEXTROAMPHETAMINE SACCHARATE, AMPHETAMINE ASPARTATE MONOHYDRATE, DEXTROAMPHETAMINE SULFATE AND AMPHETAMINE SULFATE 5; 5; 5; 5 MG/1; MG/1; MG/1; MG/1
20 CAPSULE, EXTENDED RELEASE ORAL 2 TIMES DAILY
Qty: 60 CAPSULE | Refills: 0 | Status: SHIPPED | OUTPATIENT
Start: 2025-03-03 | End: 2025-04-02

## 2025-01-02 ASSESSMENT — PATIENT HEALTH QUESTIONNAIRE - PHQ9
2. FEELING DOWN, DEPRESSED OR HOPELESS: NOT AT ALL
SUM OF ALL RESPONSES TO PHQ QUESTIONS 1-9: 0

## 2025-01-02 NOTE — PROGRESS NOTES
Anup Lacey Sr. (:  1965) is a 59 y.o. male,Established patient, here for evaluation of the following chief complaint(s):  Medication Refill         Assessment & Plan  Attention or concentration deficit       Orders:    amphetamine-dextroamphetamine (ADDERALL XR) 20 MG extended release capsule; Take 1 capsule by mouth 2 times daily for 30 days. Max Daily Amount: 40 mg    Prostate cancer screening       Orders:    PSA Screening; Future    Essential hypertension   Chronic, at goal (stable), continue current treatment plan    Orders:    CBC with Auto Differential; Future    Comprehensive Metabolic Panel; Future    Type 2 diabetes mellitus without complication, without long-term current use of insulin (HCC)       Orders:    Hemoglobin A1C; Future    Albumin/Creatinine Ratio, Urine; Future    Pure hypercholesterolemia, unspecified       Orders:    Lipid Panel; Future    Acquired hypothyroidism       Orders:    T4, Free; Future    TSH; Future    Morbid (severe) obesity due to excess calories (E66.01)            Body mass index [BMI] 35.0-35.9, adult (Z68.35)            Colon cancer screening       Orders:    Kirill Jj MD, Gastroenterology, [place]      No follow-ups on file.       Subjective   HPI has been having problems getting adderaal filled. On xr 20 mg bid, and has been on this for years. Wife says that his ability to focus and recall information goes down drastically if he is off his meds. Will get his days mixed up, and forgets what days he is supposed to work. Loses objects when he is off the medicine, forgets what to buy in the store. Can't focus at work. Wife thinks that he is spacy when he is off his meds. Has never been on Ritalin. Used to cost him $10 a months, now pharmacy wasnts $30/ monthj if he has a Good Rx card. Also needs his blood pressure, cholesterol and thyroid checked. No complaints of chest pain, shortness of breath, TIAs, claudication or edema. Clarissa

## 2025-01-02 NOTE — PROGRESS NOTES
Chief Complaint   Patient presents with    Medication Refill       \"Have you been to the ER, urgent care clinic since your last visit?  Hospitalized since your last visit?\"    NO    “Have you seen or consulted any other health care providers outside of [facility] since your last visit?”      Endocrinology - Dr. AMMON Winkler  ORTHO- Dr. Kwame Reveles        “Have you had a colorectal cancer screening such as a colonoscopy/FIT/Cologuard?    NO    No colonoscopy on file  No cologuard on file  No FIT/FOBT on file   No flexible sigmoidoscopy on file         Click Here for Release of Records Request       There were no vitals filed for this visit.   Health Maintenance Due   Topic Date Due    Pneumococcal 0-64 years Vaccine (1 of 2 - PCV) Never done    Diabetic foot exam  Never done    HIV screen  Never done    Diabetic Alb to Cr ratio (uACR) test  Never done    Diabetic retinal exam  Never done    Hepatitis C screen  Never done    Hepatitis B vaccine (1 of 3 - 19+ 3-dose series) Never done    Colorectal Cancer Screen  Never done    Shingles vaccine (1 of 2) Never done    A1C test (Diabetic or Prediabetic)  2020    Lipids  2024    Depression Screen  2024      VERBAL ORDER FROM DR. PATEL FOR FLU VACCINE LEFT DELTOID IM.    The patient, Anup Lacey SrAbner, identity was verified by name and .

## 2025-01-03 LAB
ALBUMIN SERPL-MCNC: 4.3 G/DL (ref 3.8–4.9)
ALBUMIN/CREAT UR: 4 MG/G CREAT (ref 0–29)
ALP SERPL-CCNC: 67 IU/L (ref 44–121)
ALT SERPL-CCNC: 19 IU/L (ref 0–44)
AST SERPL-CCNC: 25 IU/L (ref 0–40)
BASOPHILS # BLD AUTO: 0 X10E3/UL (ref 0–0.2)
BASOPHILS NFR BLD AUTO: 1 %
BILIRUB SERPL-MCNC: 0.3 MG/DL (ref 0–1.2)
BUN SERPL-MCNC: 15 MG/DL (ref 6–24)
BUN/CREAT SERPL: 15 (ref 9–20)
CALCIUM SERPL-MCNC: 9.4 MG/DL (ref 8.7–10.2)
CHLORIDE SERPL-SCNC: 102 MMOL/L (ref 96–106)
CHOLEST SERPL-MCNC: 239 MG/DL (ref 100–199)
CO2 SERPL-SCNC: 19 MMOL/L (ref 20–29)
CREAT SERPL-MCNC: 0.99 MG/DL (ref 0.76–1.27)
CREAT UR-MCNC: 96.9 MG/DL
EGFRCR SERPLBLD CKD-EPI 2021: 88 ML/MIN/1.73
EOSINOPHIL # BLD AUTO: 0.2 X10E3/UL (ref 0–0.4)
EOSINOPHIL NFR BLD AUTO: 4 %
ERYTHROCYTE [DISTWIDTH] IN BLOOD BY AUTOMATED COUNT: 14.4 % (ref 11.6–15.4)
GLOBULIN SER CALC-MCNC: 2.6 G/DL (ref 1.5–4.5)
GLUCOSE SERPL-MCNC: 95 MG/DL (ref 70–99)
HBA1C MFR BLD: 6.1 % (ref 4.8–5.6)
HCT VFR BLD AUTO: 45.9 % (ref 37.5–51)
HDLC SERPL-MCNC: 34 MG/DL
HGB BLD-MCNC: 15.1 G/DL (ref 13–17.7)
IMM GRANULOCYTES # BLD AUTO: 0 X10E3/UL (ref 0–0.1)
IMM GRANULOCYTES NFR BLD AUTO: 0 %
IMP & REVIEW OF LAB RESULTS: NORMAL
LDLC SERPL CALC-MCNC: 124 MG/DL (ref 0–99)
LYMPHOCYTES # BLD AUTO: 2 X10E3/UL (ref 0.7–3.1)
LYMPHOCYTES NFR BLD AUTO: 39 %
Lab: NORMAL
MCH RBC QN AUTO: 29.8 PG (ref 26.6–33)
MCHC RBC AUTO-ENTMCNC: 32.9 G/DL (ref 31.5–35.7)
MCV RBC AUTO: 91 FL (ref 79–97)
MICROALBUMIN UR-MCNC: 3.6 UG/ML
MONOCYTES # BLD AUTO: 0.4 X10E3/UL (ref 0.1–0.9)
MONOCYTES NFR BLD AUTO: 7 %
NEUTROPHILS # BLD AUTO: 2.6 X10E3/UL (ref 1.4–7)
NEUTROPHILS NFR BLD AUTO: 49 %
PLATELET # BLD AUTO: 203 X10E3/UL (ref 150–450)
POTASSIUM SERPL-SCNC: 4.4 MMOL/L (ref 3.5–5.2)
PROT SERPL-MCNC: 6.9 G/DL (ref 6–8.5)
PSA SERPL-MCNC: 1.5 NG/ML (ref 0–4)
RBC # BLD AUTO: 5.06 X10E6/UL (ref 4.14–5.8)
SODIUM SERPL-SCNC: 139 MMOL/L (ref 134–144)
T4 FREE SERPL-MCNC: 1.18 NG/DL (ref 0.82–1.77)
TRIGL SERPL-MCNC: 452 MG/DL (ref 0–149)
TSH SERPL DL<=0.005 MIU/L-ACNC: 1.57 UIU/ML (ref 0.45–4.5)
VLDLC SERPL CALC-MCNC: 81 MG/DL (ref 5–40)
WBC # BLD AUTO: 5.2 X10E3/UL (ref 3.4–10.8)

## 2025-01-31 ENCOUNTER — TELEMEDICINE (OUTPATIENT)
Age: 60
End: 2025-01-31
Payer: COMMERCIAL

## 2025-01-31 DIAGNOSIS — E11.65 TYPE 2 DIABETES MELLITUS WITH HYPERGLYCEMIA, WITHOUT LONG-TERM CURRENT USE OF INSULIN (HCC): Primary | ICD-10-CM

## 2025-01-31 PROCEDURE — G0108 DIAB MANAGE TRN  PER INDIV: HCPCS

## 2025-01-31 NOTE — PROGRESS NOTES
[facility]  Diabetes Self-Management Education & Support Program  Post-program Evaluation    EVALUATION @ COMPLETION OF THE PROGRAM    Anup Lacey Sr. completed 6 hours of diabetes self-management education. The participant acquired new knowledge and demonstrated new skills during the program.     The participant worked on the following SMART GOAL(s) to improve their diabetes self-management:     Check BG 1-2 times a week   ACHIEVEMENT OF GOAL(S) : %    The participant's pre-program A1c was 7.8  Lab Results   Component Value Date/Time    PGT2ZMTW 6.1 08/08/2019 12:08 PM    LABA1C 6.1 01/02/2025 12:00 AM   . The post-evaluation A1c is 6.1.    The participant improved their Diabetes Skills, Confidence and Preparedness Index (scored out of 7):    Total score: 6.8  Skills: 7  Confidence: 7  Preparedness: 6.5    FINAL RECOMMENDATIONS:  Increase physical activity.     Next provider visit is scheduled for July 2025       PATEL ZHANG RN on 1/31/2025     Metric Patient's responses (1/31/2025) Areas for improvement     Healthy Eating       The participant is using the Healthy Plate to control carbohydrate intake - Yes    The participant reads food labels accurately - Yes          Being Active       The participant performs physical activity where your heart beats faster and your breathing is harder than normal for 30 minutes or more? 2 day(s)     In a typical week, the participant performs physical activity 2 day(s)     Swims twice a week, walks at work.     Monitoring   The participant is monitoring their blood sugars? Yes    The participant is monitoring < 1x/day, has not checked recently    The participant improved their A1c - Yes    The participant understands the meaning of the A1c - Yes        Taking Medications   The participant understands what their diabetes medications do Yes    The participant can afford your diabetes medications Yes    The participant does not miss doses

## 2025-02-07 DIAGNOSIS — F90.2 ATTENTION DEFICIT HYPERACTIVITY DISORDER (ADHD), COMBINED TYPE: ICD-10-CM

## 2025-02-10 RX ORDER — DEXTROAMPHETAMINE SACCHARATE, AMPHETAMINE ASPARTATE MONOHYDRATE, DEXTROAMPHETAMINE SULFATE AND AMPHETAMINE SULFATE 5; 5; 5; 5 MG/1; MG/1; MG/1; MG/1
20 CAPSULE, EXTENDED RELEASE ORAL 2 TIMES DAILY
Qty: 60 CAPSULE | Refills: 0 | Status: SHIPPED | OUTPATIENT
Start: 2025-02-10 | End: 2025-03-12

## 2025-03-14 DIAGNOSIS — F90.2 ATTENTION DEFICIT HYPERACTIVITY DISORDER (ADHD), COMBINED TYPE: ICD-10-CM

## 2025-03-14 RX ORDER — DEXTROAMPHETAMINE SACCHARATE, AMPHETAMINE ASPARTATE MONOHYDRATE, DEXTROAMPHETAMINE SULFATE AND AMPHETAMINE SULFATE 5; 5; 5; 5 MG/1; MG/1; MG/1; MG/1
20 CAPSULE, EXTENDED RELEASE ORAL 2 TIMES DAILY
Qty: 60 CAPSULE | Refills: 0 | Status: SHIPPED | OUTPATIENT
Start: 2025-03-14 | End: 2025-04-13

## 2025-03-14 NOTE — TELEPHONE ENCOUNTER
Last appointment: 1/2/25  Next appointment: 6/25/25  Previous refill encounter(s): 3/3/25    Requested Prescriptions     Pending Prescriptions Disp Refills    amphetamine-dextroamphetamine (ADDERALL XR) 20 MG extended release capsule 60 capsule 0     Sig: Take 1 capsule by mouth in the morning and at bedtime for 30 days. Max Daily Amount: 40 mg         For Pharmacy Admin Tracking Only    Program: Medication Refill  CPA in place:    Recommendation Provided To:   Intervention Detail: New Rx: 1, reason: Patient Preference  Intervention Accepted By:   Gap Closed?:    Time Spent (min): 5

## 2025-04-16 DIAGNOSIS — F90.2 ATTENTION DEFICIT HYPERACTIVITY DISORDER (ADHD), COMBINED TYPE: ICD-10-CM

## 2025-04-16 RX ORDER — ATOMOXETINE 40 MG/1
CAPSULE ORAL DAILY
Qty: 90 CAPSULE | Refills: 3 | Status: SHIPPED | OUTPATIENT
Start: 2025-04-16

## 2025-04-21 DIAGNOSIS — F90.2 ATTENTION DEFICIT HYPERACTIVITY DISORDER (ADHD), COMBINED TYPE: ICD-10-CM

## 2025-04-21 RX ORDER — DEXTROAMPHETAMINE SACCHARATE, AMPHETAMINE ASPARTATE MONOHYDRATE, DEXTROAMPHETAMINE SULFATE AND AMPHETAMINE SULFATE 5; 5; 5; 5 MG/1; MG/1; MG/1; MG/1
20 CAPSULE, EXTENDED RELEASE ORAL 2 TIMES DAILY
Qty: 60 CAPSULE | Refills: 0 | Status: SHIPPED | OUTPATIENT
Start: 2025-04-21 | End: 2025-05-21

## 2025-05-12 DIAGNOSIS — R60.0 LOCALIZED EDEMA: ICD-10-CM

## 2025-05-12 RX ORDER — FUROSEMIDE 40 MG/1
TABLET ORAL
Qty: 180 TABLET | Refills: 1 | Status: SHIPPED | OUTPATIENT
Start: 2025-05-12

## 2025-05-23 DIAGNOSIS — F90.2 ATTENTION DEFICIT HYPERACTIVITY DISORDER (ADHD), COMBINED TYPE: ICD-10-CM

## 2025-05-23 RX ORDER — ATOMOXETINE 40 MG/1
CAPSULE ORAL DAILY
Qty: 90 CAPSULE | Refills: 3 | OUTPATIENT
Start: 2025-05-23

## 2025-05-23 NOTE — TELEPHONE ENCOUNTER
Strattera was sent on 4/16/25 for #90 with 3 refills    For Pharmacy Admin Tracking Only    Program: Medication Refill  CPA in place:    Recommendation Provided To:   Intervention Detail: Discontinued Rx: 1, reason: Duplicate Therapy  Intervention Accepted By:   Gap Closed?:    Time Spent (min): 5

## 2025-05-24 DIAGNOSIS — R41.840 ATTENTION OR CONCENTRATION DEFICIT: ICD-10-CM

## 2025-05-27 RX ORDER — DEXTROAMPHETAMINE SACCHARATE, AMPHETAMINE ASPARTATE MONOHYDRATE, DEXTROAMPHETAMINE SULFATE AND AMPHETAMINE SULFATE 5; 5; 5; 5 MG/1; MG/1; MG/1; MG/1
20 CAPSULE, EXTENDED RELEASE ORAL 2 TIMES DAILY
Qty: 60 CAPSULE | Refills: 0 | Status: SHIPPED | OUTPATIENT
Start: 2025-05-27 | End: 2025-06-26

## 2025-05-27 NOTE — TELEPHONE ENCOUNTER
Last appointment: 1/2/25  Next appointment: 6/25/25  Previous refill encounter(s): 4/21/25 #60    Requested Prescriptions     Pending Prescriptions Disp Refills    amphetamine-dextroamphetamine (ADDERALL XR) 20 MG extended release capsule 60 capsule 0     Sig: Take 1 capsule by mouth 2 times daily for 30 days. Max Daily Amount: 40 mg         For Pharmacy Admin Tracking Only    Program: Medication Refill  CPA in place:    Recommendation Provided To:   Intervention Detail: New Rx: 1, reason: Patient Preference  Intervention Accepted By:   Gap Closed?:    Time Spent (min): 5

## 2025-06-25 ENCOUNTER — OFFICE VISIT (OUTPATIENT)
Age: 60
End: 2025-06-25
Payer: COMMERCIAL

## 2025-06-25 VITALS
TEMPERATURE: 98 F | OXYGEN SATURATION: 98 % | WEIGHT: 240 LBS | RESPIRATION RATE: 16 BRPM | SYSTOLIC BLOOD PRESSURE: 112 MMHG | HEART RATE: 89 BPM | HEIGHT: 70 IN | DIASTOLIC BLOOD PRESSURE: 73 MMHG | BODY MASS INDEX: 34.36 KG/M2

## 2025-06-25 DIAGNOSIS — R41.840 ATTENTION OR CONCENTRATION DEFICIT: ICD-10-CM

## 2025-06-25 DIAGNOSIS — L84 CALLUS: ICD-10-CM

## 2025-06-25 DIAGNOSIS — E78.00 PURE HYPERCHOLESTEROLEMIA, UNSPECIFIED: ICD-10-CM

## 2025-06-25 DIAGNOSIS — E11.9 TYPE 2 DIABETES MELLITUS WITHOUT COMPLICATION, WITHOUT LONG-TERM CURRENT USE OF INSULIN (HCC): Primary | ICD-10-CM

## 2025-06-25 DIAGNOSIS — I10 ESSENTIAL HYPERTENSION: ICD-10-CM

## 2025-06-25 LAB
GLUCOSE, POC: 117 MG/DL
HBA1C MFR BLD: 5.8 %

## 2025-06-25 PROCEDURE — 3078F DIAST BP <80 MM HG: CPT | Performed by: FAMILY MEDICINE

## 2025-06-25 PROCEDURE — 99214 OFFICE O/P EST MOD 30 MIN: CPT | Performed by: FAMILY MEDICINE

## 2025-06-25 PROCEDURE — 82962 GLUCOSE BLOOD TEST: CPT | Performed by: FAMILY MEDICINE

## 2025-06-25 PROCEDURE — 83036 HEMOGLOBIN GLYCOSYLATED A1C: CPT | Performed by: FAMILY MEDICINE

## 2025-06-25 PROCEDURE — 3074F SYST BP LT 130 MM HG: CPT | Performed by: FAMILY MEDICINE

## 2025-06-25 PROCEDURE — 3044F HG A1C LEVEL LT 7.0%: CPT | Performed by: FAMILY MEDICINE

## 2025-06-25 RX ORDER — DEXTROAMPHETAMINE SACCHARATE, AMPHETAMINE ASPARTATE MONOHYDRATE, DEXTROAMPHETAMINE SULFATE AND AMPHETAMINE SULFATE 5; 5; 5; 5 MG/1; MG/1; MG/1; MG/1
20 CAPSULE, EXTENDED RELEASE ORAL 2 TIMES DAILY
Qty: 60 CAPSULE | Refills: 0 | Status: SHIPPED | OUTPATIENT
Start: 2025-06-25 | End: 2025-07-25

## 2025-06-25 RX ORDER — KETOCONAZOLE 20 MG/G
CREAM TOPICAL DAILY PRN
COMMUNITY
Start: 2024-11-20

## 2025-06-25 RX ORDER — ROSUVASTATIN CALCIUM 20 MG/1
20 TABLET, COATED ORAL NIGHTLY
Qty: 90 TABLET | Refills: 3 | Status: SHIPPED | OUTPATIENT
Start: 2025-06-25

## 2025-06-25 RX ORDER — ROSUVASTATIN CALCIUM 20 MG/1
20 TABLET, COATED ORAL NIGHTLY
COMMUNITY
Start: 2025-05-05 | End: 2025-06-25 | Stop reason: SDUPTHER

## 2025-06-25 SDOH — ECONOMIC STABILITY: FOOD INSECURITY: WITHIN THE PAST 12 MONTHS, THE FOOD YOU BOUGHT JUST DIDN'T LAST AND YOU DIDN'T HAVE MONEY TO GET MORE.: NEVER TRUE

## 2025-06-25 SDOH — ECONOMIC STABILITY: FOOD INSECURITY: WITHIN THE PAST 12 MONTHS, YOU WORRIED THAT YOUR FOOD WOULD RUN OUT BEFORE YOU GOT MONEY TO BUY MORE.: NEVER TRUE

## 2025-06-25 SDOH — ECONOMIC STABILITY: INCOME INSECURITY: IN THE LAST 12 MONTHS, WAS THERE A TIME WHEN YOU WERE NOT ABLE TO PAY THE MORTGAGE OR RENT ON TIME?: NO

## 2025-06-25 SDOH — ECONOMIC STABILITY: TRANSPORTATION INSECURITY
IN THE PAST 12 MONTHS, HAS THE LACK OF TRANSPORTATION KEPT YOU FROM MEDICAL APPOINTMENTS OR FROM GETTING MEDICATIONS?: NO

## 2025-06-25 NOTE — PROGRESS NOTES
Chief Complaint   Patient presents with    Medication Refill    Hypertension    Diabetes    Follow-up Chronic Condition       \"Have you been to the ER, urgent care clinic since your last visit?  Hospitalized since your last visit?\"    NO    “Have you seen or consulted any other health care providers outside of [facility] since your last visit?”      [facility] - DR. MARY FALL      “Have you had a colorectal cancer screening such as a colonoscopy/FIT/Cologuard?    NO    No colonoscopy on file  No cologuard on file  No FIT/FOBT on file   No flexible sigmoidoscopy on file         Click Here for Release of Records Request       Vitals:    25 1510   BP: 112/73   Pulse: 89   Resp: 16   Temp: 98 °F (36.7 °C)   SpO2: 98%      Health Maintenance Due   Topic Date Due    Diabetic foot exam  Never done    HIV screen  Never done    Diabetic retinal exam  Never done    Hepatitis C screen  Never done    Hepatitis B vaccine (1 of 3 - 19+ 3-dose series) Never done    Pneumococcal 50+ years Vaccine (1 of 2 - PCV) Never done    Colorectal Cancer Screen  Never done    Shingles vaccine (1 of 2) Never done        The patient, Anup APOLONIA Lacey Sr., identity was verified by name and .

## 2025-06-25 NOTE — ASSESSMENT & PLAN NOTE
Orders:    AMB POC HEMOGLOBIN A1C    HM DIABETES FOOT EXAM    AMB POC GLUCOSE BLOOD, BY GLUCOSE MONITORING DEVICE

## 2025-06-25 NOTE — PROGRESS NOTES
Anup Lacey Sr. (:  1965) is a 59 y.o. male,Established patient, here for evaluation of the following chief complaint(s):  Medication Refill, Hypertension, Diabetes, and Follow-up Chronic Condition         Assessment & Plan  Type 2 diabetes mellitus without complication, without long-term current use of insulin (HCC)       Orders:    AMB POC HEMOGLOBIN A1C    HM DIABETES FOOT EXAM    AMB POC GLUCOSE BLOOD, BY GLUCOSE MONITORING DEVICE    Essential hypertension   Chronic, at goal (stable), continue current treatment plan    Orders:    CBC with Auto Differential; Future    Comprehensive Metabolic Panel; Future    Pure hypercholesterolemia, unspecified       Orders:    Lipid Panel; Future    Attention or concentration deficit       Orders:    amphetamine-dextroamphetamine (ADDERALL XR) 20 MG extended release capsule; Take 1 capsule by mouth 2 times daily for 30 days. Max Daily Amount: 40 mg    Callus   Callus trimmed with 15 blade. To use pumice stone every 2-3 weeks.            No follow-ups on file.       Subjective   HPI in for diabetes and cholesterol check. No complaints of chest pain, shortness of breath, TIAs, claudication or edema.  Needs refills of ADD meds. Youngest son just had a baby daughter. Oldest son is in [employer], currently a major, is currently in [employer]. He will probably make a career. No complaints of chest pain, shortness of breath, TIAs, claudication or edema.  Has some pain on the bottom of L foot when walks. Still on ozempic. Has lost 8 lbs since last seen.     Review of Systems       Objective   Physical Exam  Cardiovascular:      Rate and Rhythm: Normal rate and regular rhythm.      Pulses: Normal pulses.      Heart sounds: Normal heart sounds. No murmur heard.  Pulmonary:      Effort: Pulmonary effort is normal.      Breath sounds: Normal breath sounds.   Abdominal:      General: Abdomen is flat. Bowel sounds are normal.      Palpations: Abdomen is soft.

## 2025-06-26 LAB
ALBUMIN SERPL-MCNC: 4.5 G/DL (ref 3.8–4.9)
ALP SERPL-CCNC: 73 IU/L (ref 44–121)
ALT SERPL-CCNC: 20 IU/L (ref 0–44)
AST SERPL-CCNC: 23 IU/L (ref 0–40)
BASOPHILS # BLD AUTO: 0 X10E3/UL (ref 0–0.2)
BASOPHILS NFR BLD AUTO: 1 %
BILIRUB SERPL-MCNC: 0.3 MG/DL (ref 0–1.2)
BUN SERPL-MCNC: 15 MG/DL (ref 6–24)
BUN/CREAT SERPL: 15 (ref 9–20)
CALCIUM SERPL-MCNC: 9.6 MG/DL (ref 8.7–10.2)
CHLORIDE SERPL-SCNC: 99 MMOL/L (ref 96–106)
CHOLEST SERPL-MCNC: 231 MG/DL (ref 100–199)
CO2 SERPL-SCNC: 23 MMOL/L (ref 20–29)
CREAT SERPL-MCNC: 1.02 MG/DL (ref 0.76–1.27)
EGFRCR SERPLBLD CKD-EPI 2021: 85 ML/MIN/1.73
EOSINOPHIL # BLD AUTO: 0.2 X10E3/UL (ref 0–0.4)
EOSINOPHIL NFR BLD AUTO: 3 %
ERYTHROCYTE [DISTWIDTH] IN BLOOD BY AUTOMATED COUNT: 14.5 % (ref 11.6–15.4)
GLOBULIN SER CALC-MCNC: 2.6 G/DL (ref 1.5–4.5)
GLUCOSE SERPL-MCNC: 102 MG/DL (ref 70–99)
HCT VFR BLD AUTO: 48.1 % (ref 37.5–51)
HDLC SERPL-MCNC: 40 MG/DL
HGB BLD-MCNC: 16 G/DL (ref 13–17.7)
IMM GRANULOCYTES # BLD AUTO: 0 X10E3/UL (ref 0–0.1)
IMM GRANULOCYTES NFR BLD AUTO: 0 %
IMP & REVIEW OF LAB RESULTS: NORMAL
LDLC SERPL CALC-MCNC: 135 MG/DL (ref 0–99)
LYMPHOCYTES # BLD AUTO: 2.6 X10E3/UL (ref 0.7–3.1)
LYMPHOCYTES NFR BLD AUTO: 52 %
MCH RBC QN AUTO: 31.1 PG (ref 26.6–33)
MCHC RBC AUTO-ENTMCNC: 33.3 G/DL (ref 31.5–35.7)
MCV RBC AUTO: 93 FL (ref 79–97)
MONOCYTES # BLD AUTO: 0.5 X10E3/UL (ref 0.1–0.9)
MONOCYTES NFR BLD AUTO: 9 %
NEUTROPHILS # BLD AUTO: 1.7 X10E3/UL (ref 1.4–7)
NEUTROPHILS NFR BLD AUTO: 35 %
PLATELET # BLD AUTO: 216 X10E3/UL (ref 150–450)
POTASSIUM SERPL-SCNC: 4 MMOL/L (ref 3.5–5.2)
PROT SERPL-MCNC: 7.1 G/DL (ref 6–8.5)
RBC # BLD AUTO: 5.15 X10E6/UL (ref 4.14–5.8)
SODIUM SERPL-SCNC: 138 MMOL/L (ref 134–144)
TRIGL SERPL-MCNC: 308 MG/DL (ref 0–149)
VLDLC SERPL CALC-MCNC: 56 MG/DL (ref 5–40)
WBC # BLD AUTO: 4.9 X10E3/UL (ref 3.4–10.8)

## 2025-07-09 ENCOUNTER — RESULTS FOLLOW-UP (OUTPATIENT)
Age: 60
End: 2025-07-09

## 2025-08-08 DIAGNOSIS — R41.840 ATTENTION OR CONCENTRATION DEFICIT: ICD-10-CM

## 2025-08-11 RX ORDER — DEXTROAMPHETAMINE SACCHARATE, AMPHETAMINE ASPARTATE MONOHYDRATE, DEXTROAMPHETAMINE SULFATE AND AMPHETAMINE SULFATE 5; 5; 5; 5 MG/1; MG/1; MG/1; MG/1
20 CAPSULE, EXTENDED RELEASE ORAL 2 TIMES DAILY
Qty: 60 CAPSULE | Refills: 0 | Status: SHIPPED | OUTPATIENT
Start: 2025-08-11 | End: 2025-09-10

## 2025-08-22 DIAGNOSIS — F90.2 ATTENTION DEFICIT HYPERACTIVITY DISORDER (ADHD), COMBINED TYPE: ICD-10-CM

## 2025-08-25 RX ORDER — ATOMOXETINE 40 MG/1
40 CAPSULE ORAL DAILY
Qty: 90 CAPSULE | Refills: 3 | Status: SHIPPED | OUTPATIENT
Start: 2025-08-25

## 2025-09-05 ENCOUNTER — COMMUNITY OUTREACH (OUTPATIENT)
Age: 60
End: 2025-09-05